# Patient Record
Sex: FEMALE
[De-identification: names, ages, dates, MRNs, and addresses within clinical notes are randomized per-mention and may not be internally consistent; named-entity substitution may affect disease eponyms.]

---

## 2017-03-12 ENCOUNTER — CHARTING TRANS (OUTPATIENT)
Dept: OTHER | Age: 47
End: 2017-03-12

## 2018-11-05 VITALS
RESPIRATION RATE: 18 BRPM | SYSTOLIC BLOOD PRESSURE: 114 MMHG | DIASTOLIC BLOOD PRESSURE: 80 MMHG | HEART RATE: 80 BPM | TEMPERATURE: 98.8 F

## 2019-07-29 ENCOUNTER — TELEPHONE (OUTPATIENT)
Dept: SCHEDULING | Age: 49
End: 2019-07-29

## 2019-07-29 ENCOUNTER — WALK IN (OUTPATIENT)
Dept: URGENT CARE | Age: 49
End: 2019-07-29

## 2019-07-29 VITALS
RESPIRATION RATE: 16 BRPM | TEMPERATURE: 98.4 F | BODY MASS INDEX: 28.15 KG/M2 | DIASTOLIC BLOOD PRESSURE: 80 MMHG | OXYGEN SATURATION: 98 % | HEIGHT: 70 IN | SYSTOLIC BLOOD PRESSURE: 132 MMHG | HEART RATE: 84 BPM | WEIGHT: 196.65 LBS

## 2019-07-29 DIAGNOSIS — H66.92 LEFT OTITIS MEDIA, UNSPECIFIED OTITIS MEDIA TYPE: Primary | ICD-10-CM

## 2019-07-29 PROCEDURE — 99214 OFFICE O/P EST MOD 30 MIN: CPT | Performed by: NURSE PRACTITIONER

## 2019-07-29 RX ORDER — AMOXICILLIN 875 MG/1
875 TABLET, COATED ORAL 2 TIMES DAILY
Qty: 20 TABLET | Refills: 0 | Status: SHIPPED | OUTPATIENT
Start: 2019-07-29 | End: 2019-08-08

## 2019-07-29 ASSESSMENT — ENCOUNTER SYMPTOMS
NEUROLOGICAL NEGATIVE: 1
ENDOCRINE NEGATIVE: 1
EYES NEGATIVE: 1
RESPIRATORY NEGATIVE: 1
FACIAL SWELLING: 0
SINUS PRESSURE: 0
GASTROINTESTINAL NEGATIVE: 1
CONSTITUTIONAL NEGATIVE: 1
RHINORRHEA: 0
SINUS PAIN: 0

## 2023-11-06 ENCOUNTER — APPOINTMENT (OUTPATIENT)
Dept: CT IMAGING | Facility: HOSPITAL | Age: 53
End: 2023-11-06
Attending: EMERGENCY MEDICINE
Payer: COMMERCIAL

## 2023-11-06 ENCOUNTER — APPOINTMENT (OUTPATIENT)
Dept: GENERAL RADIOLOGY | Facility: HOSPITAL | Age: 53
End: 2023-11-06
Attending: EMERGENCY MEDICINE
Payer: COMMERCIAL

## 2023-11-06 ENCOUNTER — APPOINTMENT (OUTPATIENT)
Dept: MRI IMAGING | Facility: HOSPITAL | Age: 53
End: 2023-11-06
Attending: EMERGENCY MEDICINE
Payer: COMMERCIAL

## 2023-11-06 ENCOUNTER — HOSPITAL ENCOUNTER (EMERGENCY)
Facility: HOSPITAL | Age: 53
Discharge: HOME | End: 2023-11-06
Attending: EMERGENCY MEDICINE
Payer: COMMERCIAL

## 2023-11-06 VITALS
OXYGEN SATURATION: 97 % | RESPIRATION RATE: 18 BRPM | TEMPERATURE: 98 F | DIASTOLIC BLOOD PRESSURE: 63 MMHG | WEIGHT: 183 LBS | HEIGHT: 70 IN | HEART RATE: 67 BPM | SYSTOLIC BLOOD PRESSURE: 103 MMHG | BODY MASS INDEX: 26.2 KG/M2

## 2023-11-06 DIAGNOSIS — E87.1 HYPONATREMIA: ICD-10-CM

## 2023-11-06 DIAGNOSIS — J01.20 ACUTE ETHMOIDAL SINUSITIS, RECURRENCE NOT SPECIFIED: Primary | ICD-10-CM

## 2023-11-06 DIAGNOSIS — R20.2 PARESTHESIAS: ICD-10-CM

## 2023-11-06 DIAGNOSIS — U07.1 COVID-19 VIRUS INFECTION: ICD-10-CM

## 2023-11-06 DIAGNOSIS — R91.1 LUNG NODULE SEEN ON IMAGING STUDY: ICD-10-CM

## 2023-11-06 LAB
ANION GAP SERPL CALC-SCNC: 9 MMOL/L (ref 0–18)
ATRIAL RATE: 68 BPM
BASOPHILS # BLD AUTO: 0.02 X10(3) UL (ref 0–0.2)
BASOPHILS NFR BLD AUTO: 0.4 %
BILIRUB UR QL: NEGATIVE
BUN BLD-MCNC: 10 MG/DL (ref 9–23)
BUN/CREAT SERPL: 12.2 (ref 10–20)
CALCIUM BLD-MCNC: 9.3 MG/DL (ref 8.7–10.4)
CHLORIDE SERPL-SCNC: 99 MMOL/L (ref 98–112)
CLARITY UR: CLEAR
CO2 SERPL-SCNC: 24 MMOL/L (ref 21–32)
COLOR UR: YELLOW
CREAT BLD-MCNC: 0.82 MG/DL
D DIMER PPP FEU-MCNC: 0.89 UG/ML FEU (ref ?–0.53)
DEPRECATED RDW RBC AUTO: 46.5 FL (ref 35.1–46.3)
EGFRCR SERPLBLD CKD-EPI 2021: 85 ML/MIN/1.73M2 (ref 60–?)
EOSINOPHIL # BLD AUTO: 0.06 X10(3) UL (ref 0–0.7)
EOSINOPHIL NFR BLD AUTO: 1.2 %
ERYTHROCYTE [DISTWIDTH] IN BLOOD BY AUTOMATED COUNT: 13 % (ref 11–15)
FLUAV + FLUBV RNA SPEC NAA+PROBE: NEGATIVE
FLUAV + FLUBV RNA SPEC NAA+PROBE: NEGATIVE
GLUCOSE BLD-MCNC: 82 MG/DL (ref 70–99)
GLUCOSE UR-MCNC: NORMAL MG/DL
HCT VFR BLD AUTO: 46.5 %
HGB BLD-MCNC: 16 G/DL
HGB UR QL STRIP.AUTO: NEGATIVE
IMM GRANULOCYTES # BLD AUTO: 0.01 X10(3) UL (ref 0–1)
IMM GRANULOCYTES NFR BLD: 0.2 %
KETONES UR-MCNC: 40 MG/DL
LEUKOCYTE ESTERASE UR QL STRIP.AUTO: 250
LYMPHOCYTES # BLD AUTO: 2.03 X10(3) UL (ref 1–4)
LYMPHOCYTES NFR BLD AUTO: 40.1 %
MCH RBC QN AUTO: 32.9 PG (ref 26–34)
MCHC RBC AUTO-ENTMCNC: 34.4 G/DL (ref 31–37)
MCV RBC AUTO: 95.7 FL
MONOCYTES # BLD AUTO: 0.39 X10(3) UL (ref 0.1–1)
MONOCYTES NFR BLD AUTO: 7.7 %
NEUTROPHILS # BLD AUTO: 2.55 X10 (3) UL (ref 1.5–7.7)
NEUTROPHILS # BLD AUTO: 2.55 X10(3) UL (ref 1.5–7.7)
NEUTROPHILS NFR BLD AUTO: 50.4 %
OSMOLALITY SERPL CALC.SUM OF ELEC: 272 MOSM/KG (ref 275–295)
P AXIS: 52 DEGREES
P-R INTERVAL: 144 MS
PH UR: 5.5 [PH] (ref 5–8)
PLATELET # BLD AUTO: 155 10(3)UL (ref 150–450)
POTASSIUM SERPL-SCNC: 3.6 MMOL/L (ref 3.5–5.1)
PROT UR-MCNC: NEGATIVE MG/DL
Q-T INTERVAL: 420 MS
QRS DURATION: 94 MS
QTC CALCULATION (BEZET): 446 MS
R AXIS: 67 DEGREES
RBC # BLD AUTO: 4.86 X10(6)UL
RSV RNA SPEC NAA+PROBE: NEGATIVE
SARS-COV-2 RNA RESP QL NAA+PROBE: DETECTED
SODIUM SERPL-SCNC: 132 MMOL/L (ref 136–145)
SP GR UR STRIP: 1.01 (ref 1–1.03)
T AXIS: 63 DEGREES
TROPONIN I SERPL HS-MCNC: 13 NG/L
UROBILINOGEN UR STRIP-ACNC: NORMAL
VENTRICULAR RATE: 68 BPM
WBC # BLD AUTO: 5.1 X10(3) UL (ref 4–11)

## 2023-11-06 PROCEDURE — 0241U SARS-COV-2/FLU A AND B/RSV BY PCR (GENEXPERT): CPT | Performed by: EMERGENCY MEDICINE

## 2023-11-06 PROCEDURE — 71260 CT THORAX DX C+: CPT | Performed by: EMERGENCY MEDICINE

## 2023-11-06 PROCEDURE — 96361 HYDRATE IV INFUSION ADD-ON: CPT

## 2023-11-06 PROCEDURE — 84484 ASSAY OF TROPONIN QUANT: CPT | Performed by: EMERGENCY MEDICINE

## 2023-11-06 PROCEDURE — 70551 MRI BRAIN STEM W/O DYE: CPT | Performed by: EMERGENCY MEDICINE

## 2023-11-06 PROCEDURE — 96360 HYDRATION IV INFUSION INIT: CPT

## 2023-11-06 PROCEDURE — 93010 ELECTROCARDIOGRAM REPORT: CPT

## 2023-11-06 PROCEDURE — 71045 X-RAY EXAM CHEST 1 VIEW: CPT | Performed by: EMERGENCY MEDICINE

## 2023-11-06 PROCEDURE — 81001 URINALYSIS AUTO W/SCOPE: CPT | Performed by: EMERGENCY MEDICINE

## 2023-11-06 PROCEDURE — 99285 EMERGENCY DEPT VISIT HI MDM: CPT

## 2023-11-06 PROCEDURE — 85025 COMPLETE CBC W/AUTO DIFF WBC: CPT | Performed by: EMERGENCY MEDICINE

## 2023-11-06 PROCEDURE — 80048 BASIC METABOLIC PNL TOTAL CA: CPT | Performed by: EMERGENCY MEDICINE

## 2023-11-06 PROCEDURE — 93005 ELECTROCARDIOGRAM TRACING: CPT

## 2023-11-06 PROCEDURE — 85379 FIBRIN DEGRADATION QUANT: CPT | Performed by: EMERGENCY MEDICINE

## 2023-11-06 RX ORDER — IOHEXOL 350 MG/ML
75 INJECTION, SOLUTION INTRAVENOUS
Status: COMPLETED | OUTPATIENT
Start: 2023-11-06 | End: 2023-11-06

## 2023-11-06 RX ORDER — MECLIZINE HYDROCHLORIDE 25 MG/1
25 TABLET ORAL 3 TIMES DAILY PRN
Qty: 21 TABLET | Refills: 0 | Status: SHIPPED | OUTPATIENT
Start: 2023-11-06

## 2023-11-06 RX ORDER — AMOXICILLIN AND CLAVULANATE POTASSIUM 875; 125 MG/1; MG/1
1 TABLET, FILM COATED ORAL 2 TIMES DAILY
Qty: 20 TABLET | Refills: 0 | Status: SHIPPED | OUTPATIENT
Start: 2023-11-06 | End: 2023-11-16

## 2023-11-06 NOTE — ED QUICK NOTES
Pt discharged to home. Instructed on the importance of follow-up with a pulmonologist, taking medication as prescribed and to return sooner with any worsening of symptoms. All questions answered prior to disposition.

## 2023-11-06 NOTE — ED INITIAL ASSESSMENT (HPI)
Patient ambulatory to ED with complaint of covid+. Endorses worsening symptoms. Symptoms began friday. Breathing unlabored. Speaking in full clear sentences. Patient is AXOX4.

## 2023-11-06 NOTE — DISCHARGE INSTRUCTIONS
You need to see the lung Dr. in the next week to further evaluate the lung nodule. Augmentin and Meclizine as prescribed. Return for worse condition.

## 2023-11-06 NOTE — ED QUICK NOTES
Pt presents stating that she tested positive for COVID this morning and she has been having numbness/tingling in bilateral arms and legs along with difficulty walking. She said it feels like she is drunk but she doesn't drink.

## 2023-11-08 ENCOUNTER — TELEPHONE (OUTPATIENT)
Dept: PULMONOLOGY | Facility: CLINIC | Age: 53
End: 2023-11-08

## 2023-11-08 NOTE — TELEPHONE ENCOUNTER
----- Message from Andrei Rogers MD sent at 11/7/2023  1:05 PM CST -----  Regarding: RE: Lung nodule  Please add this patient to my schedule in 1 to 2 weeks  ----- Message -----  From: Jillian Amado MD  Sent: 11/6/2023   5:25 PM CST  To: Andrei Rogers MD  Subject: Lung nodule                                      Roque Rosa: This is the patient we talked about with the lung nodule.   Thanks, Chuckie Gomes

## 2023-11-22 ENCOUNTER — OFFICE VISIT (OUTPATIENT)
Dept: PULMONOLOGY | Facility: CLINIC | Age: 53
End: 2023-11-22

## 2023-11-22 VITALS
SYSTOLIC BLOOD PRESSURE: 88 MMHG | OXYGEN SATURATION: 97 % | BODY MASS INDEX: 26.34 KG/M2 | HEART RATE: 80 BPM | HEIGHT: 70 IN | DIASTOLIC BLOOD PRESSURE: 53 MMHG | RESPIRATION RATE: 13 BRPM | WEIGHT: 184 LBS

## 2023-11-22 DIAGNOSIS — Z87.891 PERSONAL HISTORY OF TOBACCO USE, PRESENTING HAZARDS TO HEALTH: ICD-10-CM

## 2023-11-22 DIAGNOSIS — J44.9 CHRONIC OBSTRUCTIVE PULMONARY DISEASE, UNSPECIFIED COPD TYPE (HCC): ICD-10-CM

## 2023-11-22 DIAGNOSIS — R91.8 LUNG MASS: Primary | ICD-10-CM

## 2023-11-22 NOTE — TELEPHONE ENCOUNTER
Attempted to contact patient multiple times without success regarding appointment.      Dr. Tracy Ross: Tolu Viramontes

## 2023-11-22 NOTE — H&P
[facility]    Pulmonary consult     Maci Chu Patient Status:  Specimen    1970 MRN [id number]   Location [facility] Attending No att. providers found   [facility] Day # 0 PCP None Pcp     Date:  2023    History provided by:patient  HPI:     Chief Complaint   Patient presents with    Consult     Pt was in ER on 23 and pt had testing done and was told she had nodules. HPI    51-year-old female with history of hysterectomy  for cervical cancer, lifelong history of smoking since age 15years old  Presented to ER on 2023 after she was tested positive for COVID-19 on 2023  No significant pulmonary symptoms with different other symptoms including dizziness and weakness in her brain MRI was negative  D-dimer slightly elevated and chest CT showed mild COPD changes and right upper lobe mass 2.9 cm with 2 other small subcentimeter nodules. No significant symptoms for now  Denied any significant cough or sputum or hemoptysis  No significant dyspnea or dyspnea upon exertion or wheezes  No TB or exposure  No prior pneumonia or chest trauma or lung surgery  No skin rashes or joint pain  No lower extremity edema or pain or calf tenderness  No fever or chills        History     Past Medical History:   Diagnosis Date    H/O: hysterectomy      History reviewed. No pertinent surgical history. No family history on file. Social History:  Social History     Socioeconomic History    Marital status:    Tobacco Use    Smoking status: Every Day     Types: Cigarettes    Smokeless tobacco: Never     Allergies/Medications: Allergies: No Known Allergies  (Not in a hospital admission)      Review of Systems:     Constitutional: Negative. HENT: Negative. Eyes: Negative. Respiratory: Negative. Cardiovascular: Negative. Gastrointestinal: Negative. Genitourinary: Negative.     Musculoskeletal: Negative. Skin: Negative. Neurological: Negative. Hematological: Negative. Psychiatric/Behavioral: Negative. Physical Exam:   Vital Signs:  Blood pressure (!) 76/43, pulse 80, resp. rate 13, height 5' 10\" (1.778 m), weight 184 lb (83.5 kg), SpO2 97%. Physical Exam  Constitutional:       General: She is not in acute distress. Appearance: Normal appearance. She is not ill-appearing. HENT:      Head: Normocephalic and atraumatic. Nose: Nose normal.      Mouth/Throat:      Mouth: Mucous membranes are moist.   Eyes:      General: No scleral icterus. Pupils: Pupils are equal, round, and reactive to light. Cardiovascular:      Rate and Rhythm: Normal rate. Pulses: Normal pulses. Heart sounds: No murmur heard. No gallop. Pulmonary:      Effort: No respiratory distress. Breath sounds: No stridor. No wheezing, rhonchi or rales. Chest:      Chest wall: No tenderness. Abdominal:      General: Abdomen is flat. Bowel sounds are normal. There is no distension. Palpations: Abdomen is soft. There is no mass. Tenderness: There is no abdominal tenderness. There is no guarding. Hernia: No hernia is present. Musculoskeletal:      Cervical back: Normal range of motion. No rigidity. Right lower leg: No edema. Left lower leg: No edema. Skin:     General: Skin is dry. Neurological:      General: No focal deficit present. Mental Status: She is oriented to person, place, and time. Results:     Lab Results   Component Value Date    WBC 5.1 11/06/2023    HGB 16.0 11/06/2023    HCT 46.5 11/06/2023    .0 11/06/2023    CREATSERUM 0.82 11/06/2023    BUN 10 11/06/2023     (L) 11/06/2023    K 3.6 11/06/2023    CL 99 11/06/2023    CO2 24.0 11/06/2023    GLU 82 11/06/2023    CA 9.3 11/06/2023    DDIMER 0.89 (H) 11/06/2023    TROPHS 13 11/06/2023     Chest ct 11/7/2023 reviewed independently  Impression   CONCLUSION:  1.  Negative for pulmonary embolism. 2. No findings to indicate acute viral pneumonia due to COVID-19.  3. Moderate emphysema with a nodular opacity at the right lung apex measuring 2.9 x 1.8 x 1.6 cm. While the elongated configuration could relate to a focus of nodular scarring or bacterial pneumonia, the possibility of a malignancy such as a primary  bronchogenic carcinoma cannot be excluded. According to the 2017 guidelines from the [address] [place] [address] for the follow-up and management of incidentally detected indeterminate pulmonary nodules, please consider the following recommendations after  clinical assessment of risk factors: Consider CT at three months, PET-CT or tissue sampling. However, negative PET-CT does not exclude low-grade malignancy, FDG uptake may be underestimated in small nodules <1cm, or those close to the diaphragm. 4. Additional sub 6 mm noncalcified pulmonary nodules are seen in both upper lobes, which are nonspecific but are probably inflammatory in nature/due to small airways infection. These nodules can be reassessed on the follow-up study recommended above. 5. Mildly enlarged right hilar lymph node and additional small middle mediastinal/left hilar lymph nodes that are not pathologic by CT size criteria. These lymph nodes may all be benign/reactive in nature, although juan metastases cannot be excluded in   the setting of a primary bronchogenic carcinoma. Br  Impression   CONCLUSION:     Limited 3-sequence stroke protocol study was performed. Within these parameters:     1. No acute intracranial process. 2. Moderate left ethmoidomaxillary sinusitis.   Right maxillary sinus retention cyst.   ain MRI :    Assessment/Plan:     1- lung mass / RUL 2.9 x 1.8 cm   Small right hilar and subcarinal lymph node  2 tiny nodules in the apex about 6 mm    Lifelong history of smoking  Possible primary bronchogenic carcinoma    Plan :   PET scan and then likely to proceed with bronchoscopy and biopsy    2-lifelong history of smoking  Minimal symptoms for COPD /normal lung exam  Mild emphysema changes on chest CT    Plan :  Smoking cessation /counseling provided  PFTs     3- h/o covid 19 in 11/6/23   Recovered and asymptomatic now     D/w pt at length /all questions were answered. Orders for today :  PET scan  PFTs  Follow-up in 2 to 3 weeks            Katheryn Glover MD  11/22/2023

## 2023-12-08 ENCOUNTER — HOSPITAL ENCOUNTER (OUTPATIENT)
Dept: NUCLEAR MEDICINE | Facility: HOSPITAL | Age: 53
Discharge: HOME | End: 2023-12-08
Attending: INTERNAL MEDICINE
Payer: COMMERCIAL

## 2023-12-08 DIAGNOSIS — R91.8 LUNG MASS: ICD-10-CM

## 2023-12-08 LAB — GLUCOSE BLDC GLUCOMTR-MCNC: 116 MG/DL (ref 70–99)

## 2023-12-08 PROCEDURE — 82962 GLUCOSE BLOOD TEST: CPT

## 2023-12-08 PROCEDURE — 78815 PET IMAGE W/CT SKULL-THIGH: CPT | Performed by: INTERNAL MEDICINE

## 2023-12-14 ENCOUNTER — HOSPITAL ENCOUNTER (OUTPATIENT)
Dept: RESPIRATORY THERAPY | Facility: HOSPITAL | Age: 53
Discharge: HOME | End: 2023-12-14
Attending: INTERNAL MEDICINE
Payer: COMMERCIAL

## 2023-12-14 DIAGNOSIS — J44.9 CHRONIC OBSTRUCTIVE PULMONARY DISEASE, UNSPECIFIED COPD TYPE (HCC): ICD-10-CM

## 2023-12-14 DIAGNOSIS — Z87.891 PERSONAL HISTORY OF TOBACCO USE, PRESENTING HAZARDS TO HEALTH: ICD-10-CM

## 2023-12-14 PROCEDURE — 94060 EVALUATION OF WHEEZING: CPT | Performed by: INTERNAL MEDICINE

## 2023-12-14 PROCEDURE — 94726 PLETHYSMOGRAPHY LUNG VOLUMES: CPT | Performed by: INTERNAL MEDICINE

## 2023-12-14 PROCEDURE — 94729 DIFFUSING CAPACITY: CPT | Performed by: INTERNAL MEDICINE

## 2023-12-15 ENCOUNTER — TELEPHONE (OUTPATIENT)
Dept: PULMONOLOGY | Facility: CLINIC | Age: 53
End: 2023-12-15

## 2023-12-15 ENCOUNTER — OFFICE VISIT (OUTPATIENT)
Dept: PULMONOLOGY | Facility: CLINIC | Age: 53
End: 2023-12-15

## 2023-12-15 VITALS
HEART RATE: 75 BPM | DIASTOLIC BLOOD PRESSURE: 62 MMHG | HEIGHT: 70 IN | RESPIRATION RATE: 16 BRPM | WEIGHT: 184.19 LBS | OXYGEN SATURATION: 96 % | SYSTOLIC BLOOD PRESSURE: 101 MMHG | BODY MASS INDEX: 26.37 KG/M2

## 2023-12-15 DIAGNOSIS — J44.9 CHRONIC OBSTRUCTIVE PULMONARY DISEASE, UNSPECIFIED COPD TYPE (HCC): ICD-10-CM

## 2023-12-15 DIAGNOSIS — R91.8 LUNG MASS: Primary | ICD-10-CM

## 2023-12-15 DIAGNOSIS — D49.1 LUNG TUMOR: Primary | ICD-10-CM

## 2023-12-15 PROCEDURE — 3078F DIAST BP <80 MM HG: CPT | Performed by: INTERNAL MEDICINE

## 2023-12-15 PROCEDURE — 3008F BODY MASS INDEX DOCD: CPT | Performed by: INTERNAL MEDICINE

## 2023-12-15 PROCEDURE — 99214 OFFICE O/P EST MOD 30 MIN: CPT | Performed by: INTERNAL MEDICINE

## 2023-12-15 PROCEDURE — 3074F SYST BP LT 130 MM HG: CPT | Performed by: INTERNAL MEDICINE

## 2023-12-15 RX ORDER — ALBUTEROL SULFATE 90 UG/1
2 AEROSOL, METERED RESPIRATORY (INHALATION) EVERY 6 HOURS PRN
Qty: 1 EACH | Refills: 0 | Status: SHIPPED | OUTPATIENT
Start: 2023-12-15

## 2023-12-15 NOTE — TELEPHONE ENCOUNTER
Per Dr. Rekha Zuñiga Robotic EBUS scheduled on 12/27/23 at 10am,  CT ION protocol needs to be scheduled at 8am.    Dr. Meri Bo - Please review/sign pended orders.

## 2023-12-15 NOTE — PROCEDURES
[facility]     Pulmonary Function Test     Massimo Zuniga Patient Status:  Outpatient    1970 MRN [id number]   Date of Exam 23 PCP None Pcp           Spirometry   FEV1: 3.03 93%  FVC: 4.50 109%  FEV1/FVC: 0.67    Lung Volume   T.14 120%  RV : 2.64 123%    Diffusion Capacity   DLCO: 14.7 59%    Flow Volume Loop       Impression   Mild obstructive defect seen without significant postbronchodilator response observed. Normal lung volumes with some air trapping seen.   Mild to moderate reduction in diffusion capacity    Marry Valerio, [address]

## 2023-12-15 NOTE — PROGRESS NOTES
Subjective:   Patient ID: Renato Murillo is a 48year old female. HPI    History/Other:   Review of Systems   Constitutional: Negative. HENT: Negative. Eyes: Negative. Respiratory: Negative. Cardiovascular: Negative. Gastrointestinal: Negative. Genitourinary: Negative. Musculoskeletal: Negative. Skin: Negative. Neurological: Negative. Hematological: Negative. Psychiatric/Behavioral: Negative. Current Outpatient Medications   Medication Sig Dispense Refill    meclizine 25 MG Oral Tab Take 1 tablet (25 mg total) by mouth 3 (three) times daily as needed. (Patient not taking: Reported on 11/22/2023) 21 tablet 0     Allergies:No Known Allergies    Objective:   Physical Exam  Constitutional:       General: She is not in acute distress. Appearance: Normal appearance. She is not ill-appearing. HENT:      Head: Normocephalic and atraumatic. Nose: Nose normal.      Mouth/Throat:      Mouth: Mucous membranes are moist.   Eyes:      General: No scleral icterus. Pupils: Pupils are equal, round, and reactive to light. Cardiovascular:      Rate and Rhythm: Normal rate. Pulses: Normal pulses. Heart sounds: No murmur heard. No gallop. Pulmonary:      Effort: Pulmonary effort is normal. No respiratory distress. Breath sounds: No stridor. No wheezing, rhonchi or rales. Abdominal:      General: Abdomen is flat. Bowel sounds are normal. There is no distension. Palpations: There is no mass. Tenderness: There is no abdominal tenderness. Musculoskeletal:      Cervical back: Normal range of motion. Right lower leg: No edema. Left lower leg: No edema. Skin:     General: Skin is dry. Neurological:      General: No focal deficit present. Mental Status: She is oriented to person, place, and time.            PET scan 12/8/2023 reviewed personally   FINDINGS:  Mediastinal blood pool is 1 point max SUV, and hepatic blood pool is 2.6 max SUV.     HEAD/NECK: No pathologic FDG activity. Bilateral maxillary sinus retention cysts. LUNGS: Approximately 2 cm irregular right apical pulmonary nodule with SUV max of 4.6 . A 6 mm peripheral posterior right upper lobe pulmonary nodule is not FDG-avid. Emphysema. MEDIASTINUM/AARON: 11 x 15 mm lower right paratracheal lymph node with SUV max of 4.4. Small hiatal hernia. CHEST WALL/AXILLA: No pathologic FDG activity. ABDOMEN/PELVIS: No pathologic FDG activity. Atherosclerotic vascular calcification. Small fat containing umbilical hernia. Atherosclerotic vascular calcification. Post hysterectomy. MUSCULOSKELETAL: No pathologic FDG activity. No suspicious lesions on CT imaging. Impression   CONCLUSION:  1. 2 cm irregular hypermetabolic right apical pulmonary nodule compatible with bronchogenic carcinoma. Hypermetabolic lower right paratracheal lymph node suspicious for metastatic disease. 2. Nonspecific 6 mm right upper lobe pulmonary nodule is not FDG avid, but this could be related to nodule size. 3. Fax       PFTs   Spirometry   FEV1: 3.03 93%  FVC: 4.50 109%  FEV1/FVC: 0.67     Lung Volume   T.14 120%  RV : 2.64 123%     Diffusion Capacity   DLCO: 14.7 59%     Flow Volume Loop         Impression   Mild obstructive defect seen without significant postbronchodilator response observed. Normal lung volumes with some air trapping seen. Mild to moderate reduction in diffusion capacity     Assessment & Plan:   1. Lung tumor    2. Chronic obstructive pulmonary disease, unspecified COPD type ([address]      1- lung mass / RUL 2 cm   Positive  on PET scan with positive right 4 R / 1.2 cm paratracheal lymph node .     Most consistent with primary bronchogenic carcinoma especially with extensive history of smoking    Plan :   Robotic bronchoscopy with EBUS by Dr. Bryson Kyle  I already discussed the case with him and with the patient and she is agreeable       Lifelong history of smoking  Possible primary bronchogenic carcinoma       2- Mild copd / FEV1 3.03 L 93 %   lifelong history of smoking  Minimal symptoms for COPD /normal lung exam  Mild emphysema changes on chest CT     Plan :  Smoking cessation /counseling provided  Albuterol prn       F/u in 2-3 weeks                  Meds This Visit:  Requested Prescriptions      No prescriptions requested or ordered in this encounter       Imaging & Referrals:  None

## 2023-12-18 NOTE — TELEPHONE ENCOUNTER
Spoke with Dr. Venita Thao, procedure will now be at 9:30am on 12/27/23, confirmed time with [facility] in Endo scheduling.

## 2023-12-19 NOTE — TELEPHONE ENCOUNTER
Signed off on orders. Actually I am still in the process of trying to coordinate if this will be at 930 or 10:00 still give me a day or so before I sort this out.

## 2023-12-21 NOTE — TELEPHONE ENCOUNTER
Spoke with Dr. Hortencia Piedra, prefers 10am procedure time. Spoke with Meg in Endoscopy Scheduling, Robotic EBUS scheduled for 12/27/23 at [facility] with Emir Muller in [facility], CT ION protocol scheduled on 12/27/23 at [address] [facility] with patient, informed her of CT time, procedure time, location and not to eat after midnight, patient verbalized understanding.

## 2023-12-27 ENCOUNTER — ANESTHESIA (OUTPATIENT)
Dept: ENDOSCOPY | Facility: HOSPITAL | Age: 53
End: 2023-12-27
Payer: COMMERCIAL

## 2023-12-27 ENCOUNTER — APPOINTMENT (OUTPATIENT)
Dept: GENERAL RADIOLOGY | Facility: HOSPITAL | Age: 53
End: 2023-12-27
Attending: INTERNAL MEDICINE
Payer: COMMERCIAL

## 2023-12-27 ENCOUNTER — ANESTHESIA EVENT (OUTPATIENT)
Dept: ENDOSCOPY | Facility: HOSPITAL | Age: 53
End: 2023-12-27
Payer: COMMERCIAL

## 2023-12-27 ENCOUNTER — HOSPITAL ENCOUNTER (OUTPATIENT)
Facility: HOSPITAL | Age: 53
Setting detail: HOSPITAL OUTPATIENT SURGERY
Discharge: HOME | End: 2023-12-27
Attending: INTERNAL MEDICINE | Admitting: INTERNAL MEDICINE
Payer: COMMERCIAL

## 2023-12-27 ENCOUNTER — HOSPITAL ENCOUNTER (OUTPATIENT)
Dept: CT IMAGING | Facility: HOSPITAL | Age: 53
Discharge: HOME | End: 2023-12-27
Attending: INTERNAL MEDICINE
Payer: COMMERCIAL

## 2023-12-27 DIAGNOSIS — R91.1 NODULE OF RIGHT LUNG: ICD-10-CM

## 2023-12-27 DIAGNOSIS — R91.8 LUNG MASS: ICD-10-CM

## 2023-12-27 DIAGNOSIS — R59.0 MEDIASTINAL LYMPHADENOPATHY: ICD-10-CM

## 2023-12-27 LAB
BASOPHILS NFR BRONCH: 0 %
EOSINOPHIL NFR BRONCH: 0 %
LYMPHOCYTES NFR BRONCH: 24 %
MONOS+MACROS NFR BRONCH: 40 %
NEUTROPHILS NFR BRONCH: 36 %
RBC # FLD: ABNORMAL /CUMM (ref ?–1)
TOTAL CELLS COUNTED BRONCH: 37 /CUMM (ref ?–1)
TOTAL CELLS COUNTED FLD: 100
WBC CALC (IRIS) BRW: 37 /CUMM

## 2023-12-27 PROCEDURE — 76000 FLUOROSCOPY <1 HR PHYS/QHP: CPT | Performed by: INTERNAL MEDICINE

## 2023-12-27 PROCEDURE — 31654 BRONCH EBUS IVNTJ PERPH LES: CPT | Performed by: INTERNAL MEDICINE

## 2023-12-27 PROCEDURE — 0BBC8ZX EXCISION OF RIGHT UPPER LUNG LOBE, VIA NATURAL OR ARTIFICIAL OPENING ENDOSCOPIC, DIAGNOSTIC: ICD-10-PCS | Performed by: INTERNAL MEDICINE

## 2023-12-27 PROCEDURE — 31628 BRONCHOSCOPY/LUNG BX EACH: CPT | Performed by: INTERNAL MEDICINE

## 2023-12-27 PROCEDURE — 07D78ZX EXTRACTION OF THORAX LYMPHATIC, VIA NATURAL OR ARTIFICIAL OPENING ENDOSCOPIC, DIAGNOSTIC: ICD-10-PCS | Performed by: INTERNAL MEDICINE

## 2023-12-27 PROCEDURE — 8E0WXCZ ROBOTIC ASSISTED PROCEDURE OF TRUNK REGION: ICD-10-PCS | Performed by: INTERNAL MEDICINE

## 2023-12-27 PROCEDURE — 31627 NAVIGATIONAL BRONCHOSCOPY: CPT | Performed by: INTERNAL MEDICINE

## 2023-12-27 PROCEDURE — 31652 BRONCH EBUS SAMPLNG 1/2 NODE: CPT | Performed by: INTERNAL MEDICINE

## 2023-12-27 PROCEDURE — 76497 UNLISTED CT PROCEDURE: CPT | Performed by: INTERNAL MEDICINE

## 2023-12-27 PROCEDURE — 71045 X-RAY EXAM CHEST 1 VIEW: CPT | Performed by: INTERNAL MEDICINE

## 2023-12-27 PROCEDURE — 31629 BRONCHOSCOPY/NEEDLE BX EACH: CPT | Performed by: INTERNAL MEDICINE

## 2023-12-27 PROCEDURE — 31624 DX BRONCHOSCOPE/LAVAGE: CPT | Performed by: INTERNAL MEDICINE

## 2023-12-27 RX ORDER — MIDAZOLAM HYDROCHLORIDE 1 MG/ML
INJECTION INTRAMUSCULAR; INTRAVENOUS AS NEEDED
Status: DISCONTINUED | OUTPATIENT
Start: 2023-12-27 | End: 2023-12-27 | Stop reason: SURG

## 2023-12-27 RX ORDER — IPRATROPIUM BROMIDE AND ALBUTEROL SULFATE 2.5; .5 MG/3ML; MG/3ML
3 SOLUTION RESPIRATORY (INHALATION) ONCE
Status: COMPLETED | OUTPATIENT
Start: 2023-12-27 | End: 2023-12-27

## 2023-12-27 RX ORDER — DEXAMETHASONE SODIUM PHOSPHATE 4 MG/ML
VIAL (ML) INJECTION AS NEEDED
Status: DISCONTINUED | OUTPATIENT
Start: 2023-12-27 | End: 2023-12-27 | Stop reason: SURG

## 2023-12-27 RX ORDER — SODIUM CHLORIDE, SODIUM LACTATE, POTASSIUM CHLORIDE, CALCIUM CHLORIDE 600; 310; 30; 20 MG/100ML; MG/100ML; MG/100ML; MG/100ML
INJECTION, SOLUTION INTRAVENOUS CONTINUOUS
Status: DISCONTINUED | OUTPATIENT
Start: 2023-12-27 | End: 2023-12-27

## 2023-12-27 RX ORDER — NALOXONE HYDROCHLORIDE 0.4 MG/ML
0.08 INJECTION, SOLUTION INTRAMUSCULAR; INTRAVENOUS; SUBCUTANEOUS ONCE AS NEEDED
Status: DISCONTINUED | OUTPATIENT
Start: 2023-12-27 | End: 2023-12-27 | Stop reason: HOSPADM

## 2023-12-27 RX ORDER — EPHEDRINE SULFATE 50 MG/ML
INJECTION, SOLUTION INTRAVENOUS AS NEEDED
Status: DISCONTINUED | OUTPATIENT
Start: 2023-12-27 | End: 2023-12-27 | Stop reason: SURG

## 2023-12-27 RX ORDER — ONDANSETRON 2 MG/ML
INJECTION INTRAMUSCULAR; INTRAVENOUS AS NEEDED
Status: DISCONTINUED | OUTPATIENT
Start: 2023-12-27 | End: 2023-12-27 | Stop reason: SURG

## 2023-12-27 RX ORDER — LIDOCAINE HYDROCHLORIDE 10 MG/ML
INJECTION, SOLUTION EPIDURAL; INFILTRATION; INTRACAUDAL; PERINEURAL AS NEEDED
Status: DISCONTINUED | OUTPATIENT
Start: 2023-12-27 | End: 2023-12-27 | Stop reason: SURG

## 2023-12-27 RX ORDER — ROCURONIUM BROMIDE 10 MG/ML
INJECTION, SOLUTION INTRAVENOUS AS NEEDED
Status: DISCONTINUED | OUTPATIENT
Start: 2023-12-27 | End: 2023-12-27 | Stop reason: SURG

## 2023-12-27 RX ADMIN — EPHEDRINE SULFATE 10 MG: 50 INJECTION, SOLUTION INTRAVENOUS at 10:32:00

## 2023-12-27 RX ADMIN — MIDAZOLAM HYDROCHLORIDE 2 MG: 1 INJECTION INTRAMUSCULAR; INTRAVENOUS at 10:08:00

## 2023-12-27 RX ADMIN — SODIUM CHLORIDE, SODIUM LACTATE, POTASSIUM CHLORIDE, CALCIUM CHLORIDE: 600; 310; 30; 20 INJECTION, SOLUTION INTRAVENOUS at 10:09:00

## 2023-12-27 RX ADMIN — ROCURONIUM BROMIDE 20 MG: 10 INJECTION, SOLUTION INTRAVENOUS at 10:48:00

## 2023-12-27 RX ADMIN — ROCURONIUM BROMIDE 50 MG: 10 INJECTION, SOLUTION INTRAVENOUS at 10:09:00

## 2023-12-27 RX ADMIN — SODIUM CHLORIDE, SODIUM LACTATE, POTASSIUM CHLORIDE, CALCIUM CHLORIDE: 600; 310; 30; 20 INJECTION, SOLUTION INTRAVENOUS at 10:24:00

## 2023-12-27 RX ADMIN — DEXAMETHASONE SODIUM PHOSPHATE 4 MG: 4 MG/ML VIAL (ML) INJECTION at 10:27:00

## 2023-12-27 RX ADMIN — LIDOCAINE HYDROCHLORIDE 50 MG: 10 INJECTION, SOLUTION EPIDURAL; INFILTRATION; INTRACAUDAL; PERINEURAL at 10:09:00

## 2023-12-27 RX ADMIN — ONDANSETRON 4 MG: 2 INJECTION INTRAMUSCULAR; INTRAVENOUS at 10:27:00

## 2023-12-27 NOTE — PROCEDURES
Robotic navigational bronchoscopy with transbronchial needle aspiration, transbronchial biopsy and bronchoalveolar lavage of right upper lobe nodule with radial probe endobronchial ultrasound and fluoroscopic guidance and linear endobronchial ultrasound with transbronchial needle aspiration of lymph node station 4R    Preoperative diagnosis: Right upper lobe lung nodule, mediastinal lymphadenopathy    Postoperative diagnosis: Right upper lobe lung nodule, mediastinal lymphadenopathy    Anesthesia: General    Consent: Risks and benefits were reviewed with the patient in detail regarding the procedure as well as anesthesia prior to the procedure. All questions were answered. Procedure:  Prior to procedure Ion protocol CT chest was loaded on [employer]. Target was marked and measured. Visual pathways were created to the lesion. The information was stored to her USB drive and loaded on the Ion controller software. After patient intubated video bronchoscope advanced through endotracheal tube and lower trachea visualized with appeared grossly normal in appearance. Main kam was sharp and mobile. The bronchoscope was advanced into the right mainstem bronchus and the right upper, middle and lower lobe segmental and subsegmental anatomy was visualized with appeared normal in appearance without evidence of any lesions, inflammatory changes or significant secretions. The bronchoscope was next advanced into the left mainstem bronchus and the left upper, lingular and lower lobe segmental and subsegmental anatomy was visualized without any gross abnormalities seen without evidence of any lesions, inflammatory changes or significant secretions. The robotic IM catheter was introduced via the swivel adapter into the endotracheal tube. Registration was performed and confirmed with acceptable divergence. Using shaped sensing robotic catheter guidance, the right upper lobe nodule was located. Subsequently, radial probe ultrasound was introduced through the robotic catheter confirming appropriate positioning with concentric view. Using fluoroscopic guidance, transbronchial needle aspiration, transbronchial biopsies and BAL were performed. Passes were given to the cytopathologist for screening. No evidence of significant bleeding appreciated. Robotic catheter was withdrawn. The robotic bronchoscope was removed and EBUS bronchoscope advanced through ET tube. Under direct ultrasound guidance, lymph node station 4R identified and EBUS TBNA obtained from lymph node station. Mediastinal and hilar lymph nodes were otherwise inspected with no other significant lymphadenopathy noted. All the airways were inspected once before with no evidence of bleeding and bronchoscope was removed and procedure completed. All samples sent for analysis. Saturations remained stable throughout the procedure.     Immediate blood loss: <5 ml      Rodolfo Diamond DO  Pulmonary Critical Care Medicine

## 2023-12-27 NOTE — ANESTHESIA PROCEDURE NOTES
Airway  Date/Time: 12/27/2023 10:15 AM  Airway not difficult    General Information and Staff    Patient location during procedure: endo  Resident/CRNA: Valentín Noel CRNA  Performed: CRNA   Performed by: Valentín Noel CRNA  Authorized by: Valentín Noel CRNA      Indications and Patient Condition  Indications for airway management: airway protection and anesthesia  Spontaneous ventilation: present  Sedation level: deep  Preoxygenated: yes  Patient position: sniffing  MILS maintained throughout  Mask difficulty assessment: 1 - vent by mask  No planned trial extubation    Final Airway Details  Final airway type: endotracheal airway      Successful airway: ETT  Cuffed: yes   Successful intubation technique: Video laryngoscopy  Endotracheal tube insertion site: oral  Blade: Paulina  Blade size: #3  ETT size (mm): 8.0    Cormack-Lehane Classification: grade I - full view of glottis  Cuff volume (mL): 10  Measured from: lips  ETT to lips (cm): 21  Number of attempts at approach: 1  Number of other approaches attempted: 0

## 2023-12-27 NOTE — ANESTHESIA PROCEDURE NOTES
Airway  Date/Time: 12/27/2023 10:15 AM    General Information and Staff    Resident/CRNA: Wandy Montenegro CRNA  Performed: CRNA   Performed by: Wandy Montenegro CRNA  Authorized by: Wandy Montenegro CRNA

## 2023-12-28 ENCOUNTER — TELEPHONE (OUTPATIENT)
Dept: HEMATOLOGY/ONCOLOGY | Facility: HOSPITAL | Age: 53
End: 2023-12-28

## 2023-12-28 VITALS
HEART RATE: 71 BPM | WEIGHT: 184 LBS | OXYGEN SATURATION: 98 % | RESPIRATION RATE: 11 BRPM | DIASTOLIC BLOOD PRESSURE: 51 MMHG | HEIGHT: 70 IN | SYSTOLIC BLOOD PRESSURE: 94 MMHG | BODY MASS INDEX: 26.34 KG/M2 | TEMPERATURE: 98 F

## 2023-12-28 LAB — M TB CMPLX RRNA SPEC QL PROBE: NOT DETECTED

## 2023-12-28 NOTE — TELEPHONE ENCOUNTER
Called patient latricia sands for Dr Elana Wright on 1/4/24 at 415 pm.  She verbalizes understanding.

## 2024-01-04 ENCOUNTER — OFFICE VISIT (OUTPATIENT)
Dept: HEMATOLOGY/ONCOLOGY | Facility: HOSPITAL | Age: 54
End: 2024-01-04
Attending: INTERNAL MEDICINE
Payer: COMMERCIAL

## 2024-01-04 VITALS
HEART RATE: 74 BPM | TEMPERATURE: 98 F | WEIGHT: 186 LBS | DIASTOLIC BLOOD PRESSURE: 61 MMHG | OXYGEN SATURATION: 97 % | RESPIRATION RATE: 16 BRPM | BODY MASS INDEX: 26.63 KG/M2 | HEIGHT: 70 IN | SYSTOLIC BLOOD PRESSURE: 118 MMHG

## 2024-01-04 DIAGNOSIS — C34.91 MALIGNANT NEOPLASM OF RIGHT LUNG, UNSPECIFIED PART OF LUNG (HCC): Primary | ICD-10-CM

## 2024-01-04 PROCEDURE — 99245 OFF/OP CONSLTJ NEW/EST HI 55: CPT | Performed by: INTERNAL MEDICINE

## 2024-01-05 NOTE — PROGRESS NOTES
Oncology Consult    Lung Cancer     1/4/23    Requesting: Dr. 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    The primary lesion is just under 3 cm biopsy-proven there is a hypermetabolic paratracheal lymph node biopsy negative for malignancy          Past Medical History:   Diagnosis Date    H/O: hysterectomy     Shortness of breath      Social History     Socioeconomic History    Marital status:    Tobacco Use    Smoking status: Every Day     Packs/day: .5     Types: Cigarettes    Smokeless tobacco: Never   Vaping Use    Vaping Use: Never used   Substance and Sexual Activity    Alcohol use: Not Currently    Drug use: Not Currently     Lung cancer in father    Current Outpatient Medications on File Prior to Visit   Medication Sig Dispense Refill    albuterol 108 (90 Base) MCG/ACT Inhalation Aero Soln Inhale 2 puffs into the lungs every 6 (six) hours as needed for Wheezing. inhale 2 puff by inhalation route  every 4 - 6 hours as needed (Patient not taking: Reported on 1/4/2024) 1 each 0    meclizine 25 MG Oral Tab Take 1 tablet (25 mg total) by mouth 3 (three) times daily as needed. (Patient not taking: Reported on 11/22/2023) 21 tablet 0     Current Facility-Administered Medications on File Prior to Visit   Medication Dose Route Frequency Provider Last Rate Last Admin    [COMPLETED] ipratropium-albuterol (Duoneb) 0.5-2.5 (3) MG/3ML inhalation solution 3 mL  3 mL Nebulization Once Rojelio Rivera DO   3 mL at 12/27/23 0943     Allergies   Allergen Reactions    Singulair [Montelukast] SWELLING    Pollen UNKNOWN     Vitals:    01/04/24 1635   BP: 118/61   Pulse: 74   Resp: 16   Temp: 97.7 °F (36.5 °C)     Nad, rr, nd, no edema, lozoya, no deformity, nl mood, nl skin    Review: Extensive review of thoracic tumor conference today discussion with thoracic surgery pulmonology radiology radiation oncology      A/P:  53 year old  With clinical  stage I adenocarcinoma of the right upper lobe 12/27/23.  She has an adenocarcinoma with focal squamous differentiation.  Paratracheal lymph node negative on EBUS  - Discussed at thoracic tumor conference today plan to proceed with surgery she has an appointment with thoracic surgery next week

## 2024-01-10 NOTE — H&P (VIEW-ONLY)
Thoracic Surgery Consult Note     Name: Emy Gibson   Age: 53 year old   Sex: female.   MRN: [id number]    Reason for Consultation: right upper lobe adenocarcinoma     Consulting Physician: Dr. Mccurdy    Subjective:     Chief Complaint: \"I have lung cancer\"     History of Present Illness:   Ms. Gibson is a 53 year old female current smoker presenting with right upper lobe adenocarcinoma.     This was found on imaging for COVID on 11/6/23 showing a right upper lobe nodule measuring 2.9x1.8x1.6cm with enlarged right hilar lymph node. Subsequent PET showed the right upper lobe nodule had a max SUV of 4.6 and a right paratracheal lymph node had a max SUV of 4.4. EBUS by Dr. Rivera showed the right upper lobe nodule was positive for adenocarcinoma and station 4R lymph node was negative for malignancy. She was referred by Dr. Mccurdy to discuss surgery.     Patient feels well. 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 She can go up at least two flights of stairs without dyspnea. She has a chronic cough from post nasal drip. Patient denies any chest pain, fevers, chills, weight loss, changes in vision, headache, or new bone pain.     PMH includes COVID 11/2023. No heart problems. No blood thinners. No prior thoracic surgeries. She is trying to quit smoking. She is down to a half a pack per day. Was smoking 2 ppd with 70+pyh. Patient has a family history of lung cancer with her mother.     Review Of Systems:   10 point review of systems was conducted and was negative except for the pertinent positives listed in the above HPI.    Past Medical History:   Past Medical History:   Diagnosis Date    H/O: hysterectomy     Shortness of breath        Past Surgical History:   Past Surgical History:   Procedure Laterality Date    HYSTEROSCOPY      TONSILLECTOMY         Social History:   Social History     Socioeconomic History    Marital status:      Spouse name: Not on file    Number of children: Not on file    Years of  education: Not on file    Highest education level: Not on file   Occupational History    Not on file   Tobacco Use    Smoking status: Every Day     Packs/day: .5     Types: Cigarettes    Smokeless tobacco: Never   Vaping Use    Vaping Use: Never used   Substance and Sexual Activity    Alcohol use: Not Currently    Drug use: Not Currently    Sexual activity: Not on file   Other Topics Concern    Not on file   Social History Narrative    Not on file     Social Determinants of Health     Financial Resource Strain: Not on file   Food Insecurity: Not on file   Transportation Needs: Not on file   Physical Activity: Not on file   Stress: Not on file   Social Connections: Not on file   Housing Stability: Not on file       Family History: No family history on file.    Allergies:  Allergies   Allergen Reactions    Singulair [Montelukast] SWELLING    Pollen UNKNOWN       Medications:   Current Outpatient Medications on File Prior to Visit   Medication Sig Dispense Refill    albuterol 108 (90 Base) MCG/ACT Inhalation Aero Soln Inhale 2 puffs into the lungs every 6 (six) hours as needed for Wheezing. inhale 2 puff by inhalation route  every 4 - 6 hours as needed (Patient not taking: Reported on 1/4/2024) 1 each 0    meclizine 25 MG Oral Tab Take 1 tablet (25 mg total) by mouth 3 (three) times daily as needed. (Patient not taking: Reported on 11/22/2023) 21 tablet 0     Current Facility-Administered Medications on File Prior to Visit   Medication Dose Route Frequency Provider Last Rate Last Admin    [COMPLETED] ipratropium-albuterol (Duoneb) 0.5-2.5 (3) MG/3ML inhalation solution 3 mL  3 mL Nebulization Once Rojelio Rivera, DO   3 mL at 12/27/23 0943     No current facility-administered medications on file as of 1/11/2024.         Objective:      Vital Signs:  /42 (BP Location: Left arm, Patient Position: Sitting, Cuff Size: adult)   Pulse 76   Temp 97.7 °F (36.5 °C) (Oral)   Resp 16   Ht 1.778 m (5' 10\")   Wt 86.2  kg (190 lb)   SpO2 98%   BMI 27.26 kg/m²     Physical Exam:  General: well appearing female in no acute distress  HEENT: Normocephalic, PERRL, EOMI, no scleral icterus  Neck: Supple, trachea midline, no JVD, no masses. Thyroid not grossly enlarged  Nodes: no cervical or supraclavicular lymphadenopathy appreciated  Heart: regular rate and rhythm. No murmurs, rubs or gallops. No lower extremity edema.  Lungs: Normal respiratory effort. Clear to ascultation bilaterally.   Abdomen: Soft, Non-tender, non-distended. No hepatosplenomegaly noted.  Extremities: No clubbing or cyanosis. No lateralizing weakness  Neuro: No gross cranial nerve defects, no loss of sensation  Psych:  oriented to person place and time, normal mood and affect      Labs:   Lab Results   Component Value Date/Time    WBC 5.1 11/06/2023 03:37 PM    HGB 16.0 11/06/2023 03:37 PM    HCT 46.5 11/06/2023 03:37 PM    .0 11/06/2023 03:37 PM    MCV 95.7 11/06/2023 03:37 PM     Lab Results   Component Value Date/Time     (L) 11/06/2023 03:37 PM    K 3.6 11/06/2023 03:37 PM    CL 99 11/06/2023 03:37 PM    CO2 24.0 11/06/2023 03:37 PM    BUN 10 11/06/2023 03:37 PM    GLU 82 11/06/2023 03:37 PM    CA 9.3 11/06/2023 03:37 PM     No results found for: \"INR\", \"PT\", \"PTT\"  No components found for: \"TROPI\"  No results found for: \"ALB\", \"TP\", \"ALT\", \"AST\", \"RODNEY\", \"LIPASE\"      Review of Data:   CT chest 11/6/23  FINDINGS:  VASCULATURE: There is adequate opacification of the pulmonary arterial tree. No suspicious filling defects are identified in the main, lobar, segmental, or proximal subsegmental pulmonary artery branches to suggest acute pulmonary embolism. The distal  subsegmental branches are less well assessed. The main pulmonary artery trunk is normal in caliber, measuring 1.7 cm.  CARDIAC: The heart is not enlarged. There is no bowing of the interventricular septum to suggest right ventricular strain.  There is no pericardial effusion.  THORACIC  AORTA: Unremarkable configuration without aneurysm or dissection.  There is mild calcific atherosclerosis in the aortic arch.  LUNGS/PLEURA: There is moderate upper lobe predominant centrilobular emphysema with mild biapical subpleural bleb formation.  There is redemonstration of a nodular opacity at the right lung apex measuring 2.9 x 1.8 x 1.6 cm (series 4, image 18 and series   6, image 65).  This nodule demonstrates an elongated configuration in the craniocaudad direction.  Associated reticulation extends to the adjacent pleural surface.  This finding is superimposed on mild biapical pleural/parenchymal scarring.  Additional noncalcified pulmonary nodules are seen.  Specifically, there is a 7 mm subpleural nodule in the lateral aspect of the right upper lobe adjacent to the major fissure (series 4, image 70).  Smaller subpleural nodules are seen in the lateral  aspect of the right upper lobe more superiorly measuring up to 4 mm (series 4, image 54).  A few smaller subpleural nodules are also seen in the left upper lobe.  There is no pleural effusion or pneumothorax.  AIRWAYS: The tracheobronchial tree is without central mass or obstructing lesion.  MEDIASTINUM/AARON: There is a mildly enlarged right hilar lymph node measuring 1.2 cm short axis.  There are small subcarinal and left hilar node seen that are not pathologic by CT size criteria.  CHEST WALL: No axillary mass or lymphadenopathy.    LIMITED ABDOMEN: Within the parameters of the arterial phase of contrast-enhancement, the included upper abdomen is unremarkable.    BONES: No bony lesion or fracture is seen.    OTHER: Negative.               Impression   CONCLUSION:  1. Negative for pulmonary embolism.  2. No findings to indicate acute viral pneumonia due to COVID-19.  3. Moderate emphysema with a nodular opacity at the right lung apex measuring 2.9 x 1.8 x 1.6 cm.  While the elongated configuration could relate to a focus of nodular scarring or bacterial  pneumonia, the possibility of a malignancy such as a primary  bronchogenic carcinoma cannot be excluded.  According to the 2017 guidelines from the Fleischner Society for the follow-up and management of incidentally detected indeterminate pulmonary nodules, please consider the following recommendations after  clinical assessment of risk factors: Consider CT at three months, PET-CT or tissue sampling. However, negative PET-CT does not exclude low-grade malignancy, FDG uptake may be underestimated in small nodules <1cm, or those close to the diaphragm.    4. Additional sub 6 mm noncalcified pulmonary nodules are seen in both upper lobes, which are nonspecific but are probably inflammatory in nature/due to small airways infection.  These nodules can be reassessed on the follow-up study recommended above.  5. Mildly enlarged right hilar lymph node and additional small middle mediastinal/left hilar lymph nodes that are not pathologic by CT size criteria.  These lymph nodes may all be benign/reactive in nature, although juan metastases cannot be excluded in   the setting of a primary bronchogenic carcinoma.     PET 12/8/23:   FINDINGS:  Mediastinal blood pool is 1 point max SUV, and hepatic blood pool is 2.6 max SUV.     HEAD/NECK: No pathologic FDG activity.  Bilateral maxillary sinus retention cysts.    LUNGS: Approximately 2 cm irregular right apical pulmonary nodule with SUV max of 4.6 .  A 6 mm peripheral posterior right upper lobe pulmonary nodule is not FDG-avid.  Emphysema.  MEDIASTINUM/AARON: 11 x 15 mm lower right paratracheal lymph node with SUV max of 4.4.  Small hiatal hernia.    CHEST WALL/AXILLA: No pathologic FDG activity.    ABDOMEN/PELVIS: No pathologic FDG activity.  Atherosclerotic vascular calcification.  Small fat containing umbilical hernia.  Atherosclerotic vascular calcification.  Post hysterectomy.  MUSCULOSKELETAL: No pathologic FDG activity.  No suspicious lesions on CT imaging.                  Impression   CONCLUSION:  1. 2 cm irregular hypermetabolic right apical pulmonary nodule compatible with bronchogenic carcinoma.  Hypermetabolic lower right paratracheal lymph node suspicious for metastatic disease.  2. Nonspecific 6 mm right upper lobe pulmonary nodule is not FDG avid, but this could be related to nodule size.    3. Fax     EBUS 12/27/23:   Final Diagnosis:      Right upper lobe lung mass; transbronchial biopsy:   Invasive adenocarcinoma with features consistent with focal squamous differentiation (see comment).     Final Diagnosis:      A. Right upper lobe lung mass; fine needle aspiration:   Adequacy: Satisfactory for evaluation  General Category: Atypical cellular changes  Diagnosis:  Rare atypical epithelial cells present  Numerous benign bronchial epithelial cells present  Numerous histiocytes present  No definitive evidence of malignancy identified     B. Right upper lobe lung; bronchoalveolar lavage:  Adequacy: Satisfactory for evaluation  General Category: Benign, inflammatory, reactive or reparative changes  Diagnosis:  Rare benign bronchial epithelial cells present   Rare histiocytes present  No malignant cells identified     C. Lymph node, station 4R; fine-needle aspiration:  Adequacy: Satisfactory for evaluation  General Category: Benign, inflammatory, reactive or reparative changes  Diagnosis:  Lymph node elements present  (see comment)  Benign bronchial epithelial cells present  Cartilage present  No malignant cells identified        PFTs 12/14/23: FEV1 93%, DLCO 59%     Assessment/Plan:     Ms. Gibson is a 53 year old female current smoker presenting with right upper lobe adenocarcinoma.     This was found on imaging for COVID on 11/6/23 showing a right upper lobe nodule measuring 2.9x1.8x1.6cm with enlarged right hilar lymph node. Subsequent PET showed the right upper lobe nodule had a max SUV of 4.6 and a right paratracheal lymph node had a max SUV of 4.4. EBUS by Dr. Rivera  showed the right upper lobe nodule was positive for adenocarcinoma and station 4R lymph node was negative for malignancy. She was referred by Dr. Mccurdy to discuss surgery.     Discussed options including right VATS upper lobectomy. Discussed the risks and benefits of surgery. Patients PFTs are adequate but strongly encouraged smoking cessation. Patient expressed understanding and would like to proceed with surgery.      -Encouraged smoking cessation  -Scheduled for right VATS upper lobectomy at [facility] on 1/29/24 with Dr. Fong  -Pre op labs ordered    Katie Nolan PA-C  Thoracic Surgery    I have seen and examined that patient and agree with the above assessment and plan.  I have personally reviewed all the pertinent imaging, discussed the case in thoracic oncology tumor board and with the consulting physician.     Ms. Gibson  is a 53 year old female who had a right upper lobe lung nodule found on a CT scan done for covid.  This 2.9cm lesion was further evaluated by PET scan which showed it to have an SUV of 4.6 with no signs of local or regional spread of disease.  These findings were concerning for a early stage lung cancer and thus a biopsy was done.  The biopsy proved adenocarcinoma. I described for Ms. Gibson a minimally invasive, VATS lobectomy as treatment.  Her pulmonary function tests suggest that she has adequate lung function to undergo the proposed surgery.  I went over the alternatives (radiation) and the risks, including: bleeding, infection, prolonged air leak, nerve injury, MI, stroke, arrhythmia, pneumonia and death. She understands these risks and wishes to proceed.  We will plan on surgery January 29th at [facility].    Matthew Fong MD  Thoracic Surgery  Pager [phone]    I spent 80 minutes on this visit which included: review of tests, independently interpreting results, obtaining history, counseling on additional tests and procedures, communicating with the consulting physician,   care coordination and surgery, its risks and alternatives as described in the above assessment and plan.

## 2024-01-10 NOTE — CONSULTS
Thoracic Surgery Consult Note     Name: Emy Gibson   Age: 53 year old   Sex: female.   MRN: [id number]    Reason for Consultation: right upper lobe adenocarcinoma     Consulting Physician: Dr. Mccurdy    Subjective:     Chief Complaint: \"I have lung cancer\"     History of Present Illness:   Ms. Gibson is a 53 year old female current smoker presenting with right upper lobe adenocarcinoma.     This was found on imaging for COVID on 11/6/23 showing a right upper lobe nodule measuring 2.9x1.8x1.6cm with enlarged right hilar lymph node. Subsequent PET showed the right upper lobe nodule had a max SUV of 4.6 and a right paratracheal lymph node had a max SUV of 4.4. EBUS by Dr. Rivera showed the right upper lobe nodule was positive for adenocarcinoma and station 4R lymph node was negative for malignancy. She was referred by Dr. Mccurdy to discuss surgery.     Patient feels well. 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 She can go up at least two flights of stairs without dyspnea. She has a chronic cough from post nasal drip. Patient denies any chest pain, fevers, chills, weight loss, changes in vision, headache, or new bone pain.     PMH includes COVID 11/2023. No heart problems. No blood thinners. No prior thoracic surgeries. She is trying to quit smoking. She is down to a half a pack per day. Was smoking 2 ppd with 70+pyh. Patient has a family history of lung cancer with her mother.     Review Of Systems:   10 point review of systems was conducted and was negative except for the pertinent positives listed in the above HPI.    Past Medical History:   Past Medical History:   Diagnosis Date    H/O: hysterectomy     Shortness of breath        Past Surgical History:   Past Surgical History:   Procedure Laterality Date    HYSTEROSCOPY      TONSILLECTOMY         Social History:   Social History     Socioeconomic History    Marital status:      Spouse name: Not on file    Number of children: Not on file    Years of  education: Not on file    Highest education level: Not on file   Occupational History    Not on file   Tobacco Use    Smoking status: Every Day     Packs/day: .5     Types: Cigarettes    Smokeless tobacco: Never   Vaping Use    Vaping Use: Never used   Substance and Sexual Activity    Alcohol use: Not Currently    Drug use: Not Currently    Sexual activity: Not on file   Other Topics Concern    Not on file   Social History Narrative    Not on file     Social Determinants of Health     Financial Resource Strain: Not on file   Food Insecurity: Not on file   Transportation Needs: Not on file   Physical Activity: Not on file   Stress: Not on file   Social Connections: Not on file   Housing Stability: Not on file       Family History: No family history on file.    Allergies:  Allergies   Allergen Reactions    Singulair [Montelukast] SWELLING    Pollen UNKNOWN       Medications:   Current Outpatient Medications on File Prior to Visit   Medication Sig Dispense Refill    albuterol 108 (90 Base) MCG/ACT Inhalation Aero Soln Inhale 2 puffs into the lungs every 6 (six) hours as needed for Wheezing. inhale 2 puff by inhalation route  every 4 - 6 hours as needed (Patient not taking: Reported on 1/4/2024) 1 each 0    meclizine 25 MG Oral Tab Take 1 tablet (25 mg total) by mouth 3 (three) times daily as needed. (Patient not taking: Reported on 11/22/2023) 21 tablet 0     Current Facility-Administered Medications on File Prior to Visit   Medication Dose Route Frequency Provider Last Rate Last Admin    [COMPLETED] ipratropium-albuterol (Duoneb) 0.5-2.5 (3) MG/3ML inhalation solution 3 mL  3 mL Nebulization Once Rojelio Rivera, DO   3 mL at 12/27/23 0943     No current facility-administered medications on file as of 1/11/2024.         Objective:      Vital Signs:  /42 (BP Location: Left arm, Patient Position: Sitting, Cuff Size: adult)   Pulse 76   Temp 97.7 °F (36.5 °C) (Oral)   Resp 16   Ht 1.778 m (5' 10\")   Wt 86.2  kg (190 lb)   SpO2 98%   BMI 27.26 kg/m²     Physical Exam:  General: well appearing female in no acute distress  HEENT: Normocephalic, PERRL, EOMI, no scleral icterus  Neck: Supple, trachea midline, no JVD, no masses. Thyroid not grossly enlarged  Nodes: no cervical or supraclavicular lymphadenopathy appreciated  Heart: regular rate and rhythm. No murmurs, rubs or gallops. No lower extremity edema.  Lungs: Normal respiratory effort. Clear to ascultation bilaterally.   Abdomen: Soft, Non-tender, non-distended. No hepatosplenomegaly noted.  Extremities: No clubbing or cyanosis. No lateralizing weakness  Neuro: No gross cranial nerve defects, no loss of sensation  Psych:  oriented to person place and time, normal mood and affect      Labs:   Lab Results   Component Value Date/Time    WBC 5.1 11/06/2023 03:37 PM    HGB 16.0 11/06/2023 03:37 PM    HCT 46.5 11/06/2023 03:37 PM    .0 11/06/2023 03:37 PM    MCV 95.7 11/06/2023 03:37 PM     Lab Results   Component Value Date/Time     (L) 11/06/2023 03:37 PM    K 3.6 11/06/2023 03:37 PM    CL 99 11/06/2023 03:37 PM    CO2 24.0 11/06/2023 03:37 PM    BUN 10 11/06/2023 03:37 PM    GLU 82 11/06/2023 03:37 PM    CA 9.3 11/06/2023 03:37 PM     No results found for: \"INR\", \"PT\", \"PTT\"  No components found for: \"TROPI\"  No results found for: \"ALB\", \"TP\", \"ALT\", \"AST\", \"RODNEY\", \"LIPASE\"      Review of Data:   CT chest 11/6/23  FINDINGS:  VASCULATURE: There is adequate opacification of the pulmonary arterial tree. No suspicious filling defects are identified in the main, lobar, segmental, or proximal subsegmental pulmonary artery branches to suggest acute pulmonary embolism. The distal  subsegmental branches are less well assessed. The main pulmonary artery trunk is normal in caliber, measuring 1.7 cm.  CARDIAC: The heart is not enlarged. There is no bowing of the interventricular septum to suggest right ventricular strain.  There is no pericardial effusion.  THORACIC  AORTA: Unremarkable configuration without aneurysm or dissection.  There is mild calcific atherosclerosis in the aortic arch.  LUNGS/PLEURA: There is moderate upper lobe predominant centrilobular emphysema with mild biapical subpleural bleb formation.  There is redemonstration of a nodular opacity at the right lung apex measuring 2.9 x 1.8 x 1.6 cm (series 4, image 18 and series   6, image 65).  This nodule demonstrates an elongated configuration in the craniocaudad direction.  Associated reticulation extends to the adjacent pleural surface.  This finding is superimposed on mild biapical pleural/parenchymal scarring.  Additional noncalcified pulmonary nodules are seen.  Specifically, there is a 7 mm subpleural nodule in the lateral aspect of the right upper lobe adjacent to the major fissure (series 4, image 70).  Smaller subpleural nodules are seen in the lateral  aspect of the right upper lobe more superiorly measuring up to 4 mm (series 4, image 54).  A few smaller subpleural nodules are also seen in the left upper lobe.  There is no pleural effusion or pneumothorax.  AIRWAYS: The tracheobronchial tree is without central mass or obstructing lesion.  MEDIASTINUM/AARON: There is a mildly enlarged right hilar lymph node measuring 1.2 cm short axis.  There are small subcarinal and left hilar node seen that are not pathologic by CT size criteria.  CHEST WALL: No axillary mass or lymphadenopathy.    LIMITED ABDOMEN: Within the parameters of the arterial phase of contrast-enhancement, the included upper abdomen is unremarkable.    BONES: No bony lesion or fracture is seen.    OTHER: Negative.               Impression   CONCLUSION:  1. Negative for pulmonary embolism.  2. No findings to indicate acute viral pneumonia due to COVID-19.  3. Moderate emphysema with a nodular opacity at the right lung apex measuring 2.9 x 1.8 x 1.6 cm.  While the elongated configuration could relate to a focus of nodular scarring or bacterial  pneumonia, the possibility of a malignancy such as a primary  bronchogenic carcinoma cannot be excluded.  According to the 2017 guidelines from the Fleischner Society for the follow-up and management of incidentally detected indeterminate pulmonary nodules, please consider the following recommendations after  clinical assessment of risk factors: Consider CT at three months, PET-CT or tissue sampling. However, negative PET-CT does not exclude low-grade malignancy, FDG uptake may be underestimated in small nodules <1cm, or those close to the diaphragm.    4. Additional sub 6 mm noncalcified pulmonary nodules are seen in both upper lobes, which are nonspecific but are probably inflammatory in nature/due to small airways infection.  These nodules can be reassessed on the follow-up study recommended above.  5. Mildly enlarged right hilar lymph node and additional small middle mediastinal/left hilar lymph nodes that are not pathologic by CT size criteria.  These lymph nodes may all be benign/reactive in nature, although juan metastases cannot be excluded in   the setting of a primary bronchogenic carcinoma.     PET 12/8/23:   FINDINGS:  Mediastinal blood pool is 1 point max SUV, and hepatic blood pool is 2.6 max SUV.     HEAD/NECK: No pathologic FDG activity.  Bilateral maxillary sinus retention cysts.    LUNGS: Approximately 2 cm irregular right apical pulmonary nodule with SUV max of 4.6 .  A 6 mm peripheral posterior right upper lobe pulmonary nodule is not FDG-avid.  Emphysema.  MEDIASTINUM/AARON: 11 x 15 mm lower right paratracheal lymph node with SUV max of 4.4.  Small hiatal hernia.    CHEST WALL/AXILLA: No pathologic FDG activity.    ABDOMEN/PELVIS: No pathologic FDG activity.  Atherosclerotic vascular calcification.  Small fat containing umbilical hernia.  Atherosclerotic vascular calcification.  Post hysterectomy.  MUSCULOSKELETAL: No pathologic FDG activity.  No suspicious lesions on CT imaging.                  Impression   CONCLUSION:  1. 2 cm irregular hypermetabolic right apical pulmonary nodule compatible with bronchogenic carcinoma.  Hypermetabolic lower right paratracheal lymph node suspicious for metastatic disease.  2. Nonspecific 6 mm right upper lobe pulmonary nodule is not FDG avid, but this could be related to nodule size.    3. Fax     EBUS 12/27/23:   Final Diagnosis:      Right upper lobe lung mass; transbronchial biopsy:   Invasive adenocarcinoma with features consistent with focal squamous differentiation (see comment).     Final Diagnosis:      A. Right upper lobe lung mass; fine needle aspiration:   Adequacy: Satisfactory for evaluation  General Category: Atypical cellular changes  Diagnosis:  Rare atypical epithelial cells present  Numerous benign bronchial epithelial cells present  Numerous histiocytes present  No definitive evidence of malignancy identified     B. Right upper lobe lung; bronchoalveolar lavage:  Adequacy: Satisfactory for evaluation  General Category: Benign, inflammatory, reactive or reparative changes  Diagnosis:  Rare benign bronchial epithelial cells present   Rare histiocytes present  No malignant cells identified     C. Lymph node, station 4R; fine-needle aspiration:  Adequacy: Satisfactory for evaluation  General Category: Benign, inflammatory, reactive or reparative changes  Diagnosis:  Lymph node elements present  (see comment)  Benign bronchial epithelial cells present  Cartilage present  No malignant cells identified        PFTs 12/14/23: FEV1 93%, DLCO 59%     Assessment/Plan:     Ms. Gibson is a 53 year old female current smoker presenting with right upper lobe adenocarcinoma.     This was found on imaging for COVID on 11/6/23 showing a right upper lobe nodule measuring 2.9x1.8x1.6cm with enlarged right hilar lymph node. Subsequent PET showed the right upper lobe nodule had a max SUV of 4.6 and a right paratracheal lymph node had a max SUV of 4.4. EBUS by Dr. Rivera  showed the right upper lobe nodule was positive for adenocarcinoma and station 4R lymph node was negative for malignancy. She was referred by Dr. Mccurdy to discuss surgery.     Discussed options including right VATS upper lobectomy. Discussed the risks and benefits of surgery. Patients PFTs are adequate but strongly encouraged smoking cessation. Patient expressed understanding and would like to proceed with surgery.      -Encouraged smoking cessation  -Scheduled for right VATS upper lobectomy at [facility] on 1/29/24 with Dr. Fong  -Pre op labs ordered    Katie Nolan PA-C  Thoracic Surgery    I have seen and examined that patient and agree with the above assessment and plan.  I have personally reviewed all the pertinent imaging, discussed the case in thoracic oncology tumor board and with the consulting physician.     Ms. Gibson  is a 53 year old female who had a right upper lobe lung nodule found on a CT scan done for covid.  This 2.9cm lesion was further evaluated by PET scan which showed it to have an SUV of 4.6 with no signs of local or regional spread of disease.  These findings were concerning for a early stage lung cancer and thus a biopsy was done.  The biopsy proved adenocarcinoma. I described for Ms. Gibson a minimally invasive, VATS lobectomy as treatment.  Her pulmonary function tests suggest that she has adequate lung function to undergo the proposed surgery.  I went over the alternatives (radiation) and the risks, including: bleeding, infection, prolonged air leak, nerve injury, MI, stroke, arrhythmia, pneumonia and death. She understands these risks and wishes to proceed.  We will plan on surgery January 29th at [facility].    Matthew Fong MD  Thoracic Surgery  Pager [phone]    I spent 80 minutes on this visit which included: review of tests, independently interpreting results, obtaining history, counseling on additional tests and procedures, communicating with the consulting physician,   care coordination and surgery, its risks and alternatives as described in the above assessment and plan.

## 2024-01-10 NOTE — H&P (VIEW-ONLY)
Thoracic Surgery Consult Note     Name: Emy Gibson   Age: 53 year old   Sex: female.   MRN: [id number]    Reason for Consultation: right upper lobe adenocarcinoma     Consulting Physician: Dr. Mccurdy    Subjective:     Chief Complaint: \"I have lung cancer\"     History of Present Illness:   Ms. Gibson is a 53 year old female current smoker presenting with right upper lobe adenocarcinoma.     This was found on imaging for COVID on 11/6/23 showing a right upper lobe nodule measuring 2.9x1.8x1.6cm with enlarged right hilar lymph node. Subsequent PET showed the right upper lobe nodule had a max SUV of 4.6 and a right paratracheal lymph node had a max SUV of 4.4. EBUS by Dr. Rivera showed the right upper lobe nodule was positive for adenocarcinoma and station 4R lymph node was negative for malignancy. She was referred by Dr. Mccurdy to discuss surgery.     Patient feels well. 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 She can go up at least two flights of stairs without dyspnea. She has a chronic cough from post nasal drip. Patient denies any chest pain, fevers, chills, weight loss, changes in vision, headache, or new bone pain.     PMH includes COVID 11/2023. No heart problems. No blood thinners. No prior thoracic surgeries. She is trying to quit smoking. She is down to a half a pack per day. Was smoking 2 ppd with 70+pyh. Patient has a family history of lung cancer with her mother.     Review Of Systems:   10 point review of systems was conducted and was negative except for the pertinent positives listed in the above HPI.    Past Medical History:   Past Medical History:   Diagnosis Date    H/O: hysterectomy     Shortness of breath        Past Surgical History:   Past Surgical History:   Procedure Laterality Date    HYSTEROSCOPY      TONSILLECTOMY         Social History:   Social History     Socioeconomic History    Marital status:      Spouse name: Not on file    Number of children: Not on file    Years of  education: Not on file    Highest education level: Not on file   Occupational History    Not on file   Tobacco Use    Smoking status: Every Day     Packs/day: .5     Types: Cigarettes    Smokeless tobacco: Never   Vaping Use    Vaping Use: Never used   Substance and Sexual Activity    Alcohol use: Not Currently    Drug use: Not Currently    Sexual activity: Not on file   Other Topics Concern    Not on file   Social History Narrative    Not on file     Social Determinants of Health     Financial Resource Strain: Not on file   Food Insecurity: Not on file   Transportation Needs: Not on file   Physical Activity: Not on file   Stress: Not on file   Social Connections: Not on file   Housing Stability: Not on file       Family History: No family history on file.    Allergies:  Allergies   Allergen Reactions    Singulair [Montelukast] SWELLING    Pollen UNKNOWN       Medications:   Current Outpatient Medications on File Prior to Visit   Medication Sig Dispense Refill    albuterol 108 (90 Base) MCG/ACT Inhalation Aero Soln Inhale 2 puffs into the lungs every 6 (six) hours as needed for Wheezing. inhale 2 puff by inhalation route  every 4 - 6 hours as needed (Patient not taking: Reported on 1/4/2024) 1 each 0    meclizine 25 MG Oral Tab Take 1 tablet (25 mg total) by mouth 3 (three) times daily as needed. (Patient not taking: Reported on 11/22/2023) 21 tablet 0     Current Facility-Administered Medications on File Prior to Visit   Medication Dose Route Frequency Provider Last Rate Last Admin    [COMPLETED] ipratropium-albuterol (Duoneb) 0.5-2.5 (3) MG/3ML inhalation solution 3 mL  3 mL Nebulization Once Rojelio Rivera, DO   3 mL at 12/27/23 0943     No current facility-administered medications on file as of 1/11/2024.         Objective:      Vital Signs:  /42 (BP Location: Left arm, Patient Position: Sitting, Cuff Size: adult)   Pulse 76   Temp 97.7 °F (36.5 °C) (Oral)   Resp 16   Ht 1.778 m (5' 10\")   Wt 86.2  kg (190 lb)   SpO2 98%   BMI 27.26 kg/m²     Physical Exam:  General: well appearing female in no acute distress  HEENT: Normocephalic, PERRL, EOMI, no scleral icterus  Neck: Supple, trachea midline, no JVD, no masses. Thyroid not grossly enlarged  Nodes: no cervical or supraclavicular lymphadenopathy appreciated  Heart: regular rate and rhythm. No murmurs, rubs or gallops. No lower extremity edema.  Lungs: Normal respiratory effort. Clear to ascultation bilaterally.   Abdomen: Soft, Non-tender, non-distended. No hepatosplenomegaly noted.  Extremities: No clubbing or cyanosis. No lateralizing weakness  Neuro: No gross cranial nerve defects, no loss of sensation  Psych:  oriented to person place and time, normal mood and affect      Labs:   Lab Results   Component Value Date/Time    WBC 5.1 11/06/2023 03:37 PM    HGB 16.0 11/06/2023 03:37 PM    HCT 46.5 11/06/2023 03:37 PM    .0 11/06/2023 03:37 PM    MCV 95.7 11/06/2023 03:37 PM     Lab Results   Component Value Date/Time     (L) 11/06/2023 03:37 PM    K 3.6 11/06/2023 03:37 PM    CL 99 11/06/2023 03:37 PM    CO2 24.0 11/06/2023 03:37 PM    BUN 10 11/06/2023 03:37 PM    GLU 82 11/06/2023 03:37 PM    CA 9.3 11/06/2023 03:37 PM     No results found for: \"INR\", \"PT\", \"PTT\"  No components found for: \"TROPI\"  No results found for: \"ALB\", \"TP\", \"ALT\", \"AST\", \"RODNEY\", \"LIPASE\"      Review of Data:   CT chest 11/6/23  FINDINGS:  VASCULATURE: There is adequate opacification of the pulmonary arterial tree. No suspicious filling defects are identified in the main, lobar, segmental, or proximal subsegmental pulmonary artery branches to suggest acute pulmonary embolism. The distal  subsegmental branches are less well assessed. The main pulmonary artery trunk is normal in caliber, measuring 1.7 cm.  CARDIAC: The heart is not enlarged. There is no bowing of the interventricular septum to suggest right ventricular strain.  There is no pericardial effusion.  THORACIC  AORTA: Unremarkable configuration without aneurysm or dissection.  There is mild calcific atherosclerosis in the aortic arch.  LUNGS/PLEURA: There is moderate upper lobe predominant centrilobular emphysema with mild biapical subpleural bleb formation.  There is redemonstration of a nodular opacity at the right lung apex measuring 2.9 x 1.8 x 1.6 cm (series 4, image 18 and series   6, image 65).  This nodule demonstrates an elongated configuration in the craniocaudad direction.  Associated reticulation extends to the adjacent pleural surface.  This finding is superimposed on mild biapical pleural/parenchymal scarring.  Additional noncalcified pulmonary nodules are seen.  Specifically, there is a 7 mm subpleural nodule in the lateral aspect of the right upper lobe adjacent to the major fissure (series 4, image 70).  Smaller subpleural nodules are seen in the lateral  aspect of the right upper lobe more superiorly measuring up to 4 mm (series 4, image 54).  A few smaller subpleural nodules are also seen in the left upper lobe.  There is no pleural effusion or pneumothorax.  AIRWAYS: The tracheobronchial tree is without central mass or obstructing lesion.  MEDIASTINUM/AARON: There is a mildly enlarged right hilar lymph node measuring 1.2 cm short axis.  There are small subcarinal and left hilar node seen that are not pathologic by CT size criteria.  CHEST WALL: No axillary mass or lymphadenopathy.    LIMITED ABDOMEN: Within the parameters of the arterial phase of contrast-enhancement, the included upper abdomen is unremarkable.    BONES: No bony lesion or fracture is seen.    OTHER: Negative.               Impression   CONCLUSION:  1. Negative for pulmonary embolism.  2. No findings to indicate acute viral pneumonia due to COVID-19.  3. Moderate emphysema with a nodular opacity at the right lung apex measuring 2.9 x 1.8 x 1.6 cm.  While the elongated configuration could relate to a focus of nodular scarring or bacterial  pneumonia, the possibility of a malignancy such as a primary  bronchogenic carcinoma cannot be excluded.  According to the 2017 guidelines from the Fleischner Society for the follow-up and management of incidentally detected indeterminate pulmonary nodules, please consider the following recommendations after  clinical assessment of risk factors: Consider CT at three months, PET-CT or tissue sampling. However, negative PET-CT does not exclude low-grade malignancy, FDG uptake may be underestimated in small nodules <1cm, or those close to the diaphragm.    4. Additional sub 6 mm noncalcified pulmonary nodules are seen in both upper lobes, which are nonspecific but are probably inflammatory in nature/due to small airways infection.  These nodules can be reassessed on the follow-up study recommended above.  5. Mildly enlarged right hilar lymph node and additional small middle mediastinal/left hilar lymph nodes that are not pathologic by CT size criteria.  These lymph nodes may all be benign/reactive in nature, although juan metastases cannot be excluded in   the setting of a primary bronchogenic carcinoma.     PET 12/8/23:   FINDINGS:  Mediastinal blood pool is 1 point max SUV, and hepatic blood pool is 2.6 max SUV.     HEAD/NECK: No pathologic FDG activity.  Bilateral maxillary sinus retention cysts.    LUNGS: Approximately 2 cm irregular right apical pulmonary nodule with SUV max of 4.6 .  A 6 mm peripheral posterior right upper lobe pulmonary nodule is not FDG-avid.  Emphysema.  MEDIASTINUM/AARON: 11 x 15 mm lower right paratracheal lymph node with SUV max of 4.4.  Small hiatal hernia.    CHEST WALL/AXILLA: No pathologic FDG activity.    ABDOMEN/PELVIS: No pathologic FDG activity.  Atherosclerotic vascular calcification.  Small fat containing umbilical hernia.  Atherosclerotic vascular calcification.  Post hysterectomy.  MUSCULOSKELETAL: No pathologic FDG activity.  No suspicious lesions on CT imaging.                  Impression   CONCLUSION:  1. 2 cm irregular hypermetabolic right apical pulmonary nodule compatible with bronchogenic carcinoma.  Hypermetabolic lower right paratracheal lymph node suspicious for metastatic disease.  2. Nonspecific 6 mm right upper lobe pulmonary nodule is not FDG avid, but this could be related to nodule size.    3. Fax     EBUS 12/27/23:   Final Diagnosis:      Right upper lobe lung mass; transbronchial biopsy:   Invasive adenocarcinoma with features consistent with focal squamous differentiation (see comment).     Final Diagnosis:      A. Right upper lobe lung mass; fine needle aspiration:   Adequacy: Satisfactory for evaluation  General Category: Atypical cellular changes  Diagnosis:  Rare atypical epithelial cells present  Numerous benign bronchial epithelial cells present  Numerous histiocytes present  No definitive evidence of malignancy identified     B. Right upper lobe lung; bronchoalveolar lavage:  Adequacy: Satisfactory for evaluation  General Category: Benign, inflammatory, reactive or reparative changes  Diagnosis:  Rare benign bronchial epithelial cells present   Rare histiocytes present  No malignant cells identified     C. Lymph node, station 4R; fine-needle aspiration:  Adequacy: Satisfactory for evaluation  General Category: Benign, inflammatory, reactive or reparative changes  Diagnosis:  Lymph node elements present  (see comment)  Benign bronchial epithelial cells present  Cartilage present  No malignant cells identified        PFTs 12/14/23: FEV1 93%, DLCO 59%     Assessment/Plan:     Ms. Gibson is a 53 year old female current smoker presenting with right upper lobe adenocarcinoma.     This was found on imaging for COVID on 11/6/23 showing a right upper lobe nodule measuring 2.9x1.8x1.6cm with enlarged right hilar lymph node. Subsequent PET showed the right upper lobe nodule had a max SUV of 4.6 and a right paratracheal lymph node had a max SUV of 4.4. EBUS by Dr. Rivera  showed the right upper lobe nodule was positive for adenocarcinoma and station 4R lymph node was negative for malignancy. She was referred by Dr. Mccurdy to discuss surgery.     Discussed options including right VATS upper lobectomy. Discussed the risks and benefits of surgery. Patients PFTs are adequate but strongly encouraged smoking cessation. Patient expressed understanding and would like to proceed with surgery.      -Encouraged smoking cessation  -Scheduled for right VATS upper lobectomy at [place] on 1/29/24 with Dr. Fong  -Pre op labs ordered    Katie Nolan PA-C  Thoracic Surgery    I have seen and examined that patient and agree with the above assessment and plan.  I have personally reviewed all the pertinent imaging, discussed the case in thoracic oncology tumor board and with the consulting physician.     Ms. Gbison  is a 53 year old female who had a right upper lobe lung nodule found on a CT scan done for covid.  This 2.9cm lesion was further evaluated by PET scan which showed it to have an SUV of 4.6 with no signs of local or regional spread of disease.  These findings were concerning for a early stage lung cancer and thus a biopsy was done.  The biopsy proved adenocarcinoma. I described for Ms. Gibson a minimally invasive, VATS lobectomy as treatment.  Her pulmonary function tests suggest that she has adequate lung function to undergo the proposed surgery.  I went over the alternatives (radiation) and the risks, including: bleeding, infection, prolonged air leak, nerve injury, MI, stroke, arrhythmia, pneumonia and death. She understands these risks and wishes to proceed.  We will plan on surgery January 29th at [facility].    Matthew Fong MD  Thoracic Surgery  Pager [phone]    I spent 80 minutes on this visit which included: review of tests, independently interpreting results, obtaining history, counseling on additional tests and procedures, communicating with the consulting physician,   care coordination and surgery, its risks and alternatives as described in the above assessment and plan.

## 2024-01-11 ENCOUNTER — TELEPHONE (OUTPATIENT)
Dept: PULMONOLOGY | Facility: CLINIC | Age: 54
End: 2024-01-11

## 2024-01-11 ENCOUNTER — OFFICE VISIT (OUTPATIENT)
Dept: HEMATOLOGY/ONCOLOGY | Facility: HOSPITAL | Age: 54
End: 2024-01-11
Attending: THORACIC SURGERY (CARDIOTHORACIC VASCULAR SURGERY)
Payer: COMMERCIAL

## 2024-01-11 VITALS
RESPIRATION RATE: 16 BRPM | SYSTOLIC BLOOD PRESSURE: 101 MMHG | WEIGHT: 190 LBS | TEMPERATURE: 98 F | HEIGHT: 70 IN | DIASTOLIC BLOOD PRESSURE: 42 MMHG | BODY MASS INDEX: 27.2 KG/M2 | OXYGEN SATURATION: 98 % | HEART RATE: 76 BPM

## 2024-01-11 DIAGNOSIS — C34.91 ADENOCARCINOMA OF RIGHT LUNG (HCC): Primary | ICD-10-CM

## 2024-01-11 NOTE — TELEPHONE ENCOUNTER
Patient requesting to speak with RN regarding smoking cessation and to requesting for rx for patches to help her stop smoking.  Please call.  Thank you.

## 2024-01-12 ENCOUNTER — TELEPHONE (OUTPATIENT)
Dept: PULMONOLOGY | Facility: CLINIC | Age: 54
End: 2024-01-12

## 2024-01-12 RX ORDER — NICOTINE 21 MG/24HR
PATCH, TRANSDERMAL 24 HOURS TRANSDERMAL
Qty: 14 PATCH | Refills: 0 | Status: SHIPPED | OUTPATIENT
Start: 2024-01-12

## 2024-01-16 ENCOUNTER — TELEPHONE (OUTPATIENT)
Dept: PULMONOLOGY | Facility: CLINIC | Age: 54
End: 2024-01-16

## 2024-01-16 RX ORDER — BUPROPION HYDROCHLORIDE 150 MG/1
150 TABLET, FILM COATED, EXTENDED RELEASE ORAL 2 TIMES DAILY
Qty: 60 TABLET | Refills: 0 | Status: SHIPPED | OUTPATIENT
Start: 2024-01-16 | End: 2024-02-15

## 2024-01-19 NOTE — PAT NURSING NOTE
PAT nursing note: denies taking ACEs, ARBs, ASA, blood thinners, diabetic or weight loss medications; no PPM, ICD or spinal cord stimulator; patient verbalized understanding of all instructions.      Pre-Surgical Instructions for 1/29/24      Pre-Surgical Testing:     -You are scheduled for a COVID-19 test, non-fasting blood work and an EKG on Friday, 1/26 starting at 7:30 am at the [facility], located [address] [place] [address].    -If the COVID-19 test is refused or is positive, the surgery will be cancelled.      Visitor Instructions    -Visitors are welcome to accompany you the day of surgery.  -Visiting hours are 7:00 am to 8:00 pm.       Pre-Op Instructions    Scheduled Surgery: You are scheduled for Thoracic Surgery with Dr. Fong      Date of Procedure: Monday, 01/29/24      Diet Instructions:    -Do not eat or drink after 10:00 pm. This includes water, gum, candy and food.      Medication Instructions:    CONTINUE to take your prescribed medications as directed EXCEPT:    - Do not take any medications the morning of your surgery.        Medications to Stop:     -Stop all vitamins, supplements, herbal products and NSAID's (ex. Ibuprofen, Excederin, Aleve, Diclofenac, and any gels having steroids) starting 1/21.        Skin Prep:     -Shower with Dial Soap the morning of your surgery (or any antibacterial soap).       Time of Arrival: 11:00 am     -This is going to be a tentative time of arrival for surgery.   -We will call you the buisness day prior to your surgery in the late afternoon to reconfirm your arrival.   -Please check your voicemail for a message.      Admit/Discharge Status:     -You will be admitted to the hospital after surgery.      Discharge Teaching:     -Your nurse will give you specific instructions before discharge.  -Any questions, please call the surgeon's office.      Additional Instructions:     -Bring insurance card(s) and picture ID.  -Leave all  valuables at home, including jewelry.  -Wear comfortable clothing.  -No contacts, wear glasses.  -You'll receive arrival time 1 business day prior to scheduled surgery.    -Park in the [facility] parking garage at [place], [address] [place] [address].    -Check in at the [facility] reception desk in the [facility].   -Our  will be there to check you in for your procedure.   -Complimentary  parking is available starting at 6:00 am.      If you are scheduled to arrive early for 5:30 am, the [facility] reception desk does not open till 5:30 am. It may be dark, but you are in the correct location.     We are open M-F from 8am- 5pm. We are closed on holidays and weekends. We can be reached at [phone].     Thank you

## 2024-01-26 ENCOUNTER — LAB ENCOUNTER (OUTPATIENT)
Dept: LAB | Age: 54
End: 2024-01-26
Attending: STUDENT IN AN ORGANIZED HEALTH CARE EDUCATION/TRAINING PROGRAM
Payer: COMMERCIAL

## 2024-01-26 ENCOUNTER — EKG ENCOUNTER (OUTPATIENT)
Dept: LAB | Age: 54
End: 2024-01-26
Attending: THORACIC SURGERY (CARDIOTHORACIC VASCULAR SURGERY)
Payer: COMMERCIAL

## 2024-01-26 DIAGNOSIS — C34.91 ADENOCARCINOMA OF RIGHT LUNG (HCC): ICD-10-CM

## 2024-01-26 DIAGNOSIS — Z01.818 PRE-OP TESTING: ICD-10-CM

## 2024-01-26 LAB
ANION GAP SERPL CALC-SCNC: 8 MMOL/L (ref 0–18)
ANTIBODY SCREEN: NEGATIVE
ATRIAL RATE: 64 BPM
BASOPHILS # BLD AUTO: 0.07 X10(3) UL (ref 0–0.2)
BASOPHILS NFR BLD AUTO: 0.9 %
BUN BLD-MCNC: 9 MG/DL (ref 9–23)
BUN/CREAT SERPL: 9.5 (ref 10–20)
CALCIUM BLD-MCNC: 9.6 MG/DL (ref 8.7–10.4)
CHLORIDE SERPL-SCNC: 109 MMOL/L (ref 98–112)
CO2 SERPL-SCNC: 26 MMOL/L (ref 21–32)
CREAT BLD-MCNC: 0.95 MG/DL
DEPRECATED RDW RBC AUTO: 46.3 FL (ref 35.1–46.3)
EGFRCR SERPLBLD CKD-EPI 2021: 72 ML/MIN/1.73M2 (ref 60–?)
EOSINOPHIL # BLD AUTO: 0.12 X10(3) UL (ref 0–0.7)
EOSINOPHIL NFR BLD AUTO: 1.6 %
ERYTHROCYTE [DISTWIDTH] IN BLOOD BY AUTOMATED COUNT: 12.9 % (ref 11–15)
FASTING STATUS PATIENT QL REPORTED: NO
GLUCOSE BLD-MCNC: 106 MG/DL (ref 70–99)
HCT VFR BLD AUTO: 42.6 %
HGB BLD-MCNC: 14.5 G/DL
IMM GRANULOCYTES # BLD AUTO: 0.03 X10(3) UL (ref 0–1)
IMM GRANULOCYTES NFR BLD: 0.4 %
LYMPHOCYTES # BLD AUTO: 2.19 X10(3) UL (ref 1–4)
LYMPHOCYTES NFR BLD AUTO: 28.4 %
MCH RBC QN AUTO: 33.1 PG (ref 26–34)
MCHC RBC AUTO-ENTMCNC: 34 G/DL (ref 31–37)
MCV RBC AUTO: 97.3 FL
MONOCYTES # BLD AUTO: 0.49 X10(3) UL (ref 0.1–1)
MONOCYTES NFR BLD AUTO: 6.4 %
NEUTROPHILS # BLD AUTO: 4.81 X10 (3) UL (ref 1.5–7.7)
NEUTROPHILS # BLD AUTO: 4.81 X10(3) UL (ref 1.5–7.7)
NEUTROPHILS NFR BLD AUTO: 62.3 %
OSMOLALITY SERPL CALC.SUM OF ELEC: 295 MOSM/KG (ref 275–295)
P AXIS: 51 DEGREES
P-R INTERVAL: 136 MS
PLATELET # BLD AUTO: 262 10(3)UL (ref 150–450)
POTASSIUM SERPL-SCNC: 4.4 MMOL/L (ref 3.5–5.1)
Q-T INTERVAL: 432 MS
QRS DURATION: 102 MS
QTC CALCULATION (BEZET): 445 MS
R AXIS: 61 DEGREES
RBC # BLD AUTO: 4.38 X10(6)UL
RH BLOOD TYPE: POSITIVE
SODIUM SERPL-SCNC: 143 MMOL/L (ref 136–145)
T AXIS: 52 DEGREES
VENTRICULAR RATE: 64 BPM
WBC # BLD AUTO: 7.7 X10(3) UL (ref 4–11)

## 2024-01-26 PROCEDURE — 86920 COMPATIBILITY TEST SPIN: CPT

## 2024-01-26 PROCEDURE — 86850 RBC ANTIBODY SCREEN: CPT

## 2024-01-26 PROCEDURE — 80048 BASIC METABOLIC PNL TOTAL CA: CPT

## 2024-01-26 PROCEDURE — 86900 BLOOD TYPING SEROLOGIC ABO: CPT

## 2024-01-26 PROCEDURE — 85025 COMPLETE CBC W/AUTO DIFF WBC: CPT

## 2024-01-26 PROCEDURE — 93010 ELECTROCARDIOGRAM REPORT: CPT | Performed by: INTERNAL MEDICINE

## 2024-01-26 PROCEDURE — 86901 BLOOD TYPING SEROLOGIC RH(D): CPT

## 2024-01-26 PROCEDURE — 87635 SARS-COV-2 COVID-19 AMP PRB: CPT

## 2024-01-26 PROCEDURE — 93005 ELECTROCARDIOGRAM TRACING: CPT

## 2024-01-26 PROCEDURE — 36415 COLL VENOUS BLD VENIPUNCTURE: CPT

## 2024-01-27 LAB — SARS-COV-2 RNA RESP QL NAA+PROBE: NOT DETECTED

## 2024-01-29 ENCOUNTER — HOSPITAL ENCOUNTER (INPATIENT)
Facility: HOSPITAL | Age: 54
LOS: 1 days | Discharge: HOME | End: 2024-01-29
Attending: THORACIC SURGERY (CARDIOTHORACIC VASCULAR SURGERY) | Admitting: THORACIC SURGERY (CARDIOTHORACIC VASCULAR SURGERY)
Payer: COMMERCIAL

## 2024-01-29 VITALS
RESPIRATION RATE: 19 BRPM | WEIGHT: 194 LBS | BODY MASS INDEX: 27.77 KG/M2 | OXYGEN SATURATION: 98 % | HEIGHT: 70 IN | DIASTOLIC BLOOD PRESSURE: 63 MMHG | HEART RATE: 64 BPM | SYSTOLIC BLOOD PRESSURE: 104 MMHG | TEMPERATURE: 98 F

## 2024-01-29 DIAGNOSIS — C34.91 ADENOCARCINOMA OF RIGHT LUNG (HCC): Primary | ICD-10-CM

## 2024-01-29 DIAGNOSIS — Z01.818 PRE-OP TESTING: ICD-10-CM

## 2024-01-30 LAB
BLOOD TYPE BARCODE: 6200
UNIT VOLUME: 350 ML

## 2024-01-30 NOTE — INTERVAL H&P NOTE
Patient here for lung resection.  Procedure canceled and rescheduled due to urgent intraoperative concerns

## 2024-01-31 NOTE — PAT NURSING NOTE
Pre-Surgical Instructions for 2/8/24       Pre-Surgical Testing:      -You are scheduled for a COVID-19 test, and nonfasting blood test (type and screen) on Monday 2/5 at 7:30 am at the [facility], located [address] [place] [address]     -If the COVID-19 test is refused or is positive, the surgery will be cancelled.        Visitor Instructions     -Visitors are welcome to accompany you the day of surgery.  -Visiting hours are 7:00 am to 8:00 pm.        Pre-Op Instructions     Scheduled Surgery: You are scheduled for Thoracic Surgery with Dr. Fong        Date of Procedure: Thursday 2/8/24        Diet Instructions:     -Do not eat or drink after 10:00 pm. This includes water, gum, candy and food.        Medication Instructions:     CONTINUE to take your prescribed medications as directed EXCEPT:     - Do not take any medications the morning of your surgery.        Medications to Stop:      -Stop all vitamins, supplements, herbal products and NSAID's (ex. Ibuprofen, Excederin, Aleve, Diclofenac, and any gels having steroids) starting now     Skin Prep:      -Shower with Dial Soap the morning of your surgery (or any antibacterial soap).        Time of Arrival: 5:45 am     -This is going to be a tentative time of arrival for surgery.   -We will call you the business day prior to your surgery in the late afternoon to reconfirm your arrival.   -Please check your voicemail for a message.        Admit/Discharge Status:      -You will be admitted to the hospital after surgery.        Discharge Teaching:      -Your nurse will give you specific instructions before discharge.  -Any questions, please call the surgeon's office.        Additional Instructions:      -Bring insurance card(s) and picture ID.  -Leave all valuables at home, including jewelry.  -Wear comfortable clothing.  -No contacts, wear glasses.  -You'll receive arrival time 1 business day prior to scheduled surgery.     -Park in the  [facility] parking garage at [facility], [address] [place] [address].    -Check in at the [facility] reception desk in the Lobby.   -Our  will be there to check you in for your procedure.   -Complimentary  parking is available starting at 6:00 am.        If you are scheduled to arrive early for 5:30 am, the [facility] reception desk does not open till 5:30 am. It may be dark, but you are in the correct location.      We are open M-F from 8am- 5pm. We are closed on holidays and weekends. We can be reached at [phone].

## 2024-02-05 ENCOUNTER — LAB ENCOUNTER (OUTPATIENT)
Dept: LAB | Age: 54
End: 2024-02-05
Attending: THORACIC SURGERY (CARDIOTHORACIC VASCULAR SURGERY)
Payer: COMMERCIAL

## 2024-02-05 DIAGNOSIS — C34.91 ADENOCARCINOMA OF RIGHT LUNG (HCC): ICD-10-CM

## 2024-02-05 DIAGNOSIS — Z01.818 PRE-OP TESTING: ICD-10-CM

## 2024-02-05 DIAGNOSIS — Z91.89 AT HIGH RISK FOR BLEEDING: ICD-10-CM

## 2024-02-05 LAB
ANTIBODY SCREEN: NEGATIVE
RH BLOOD TYPE: POSITIVE

## 2024-02-05 PROCEDURE — 86901 BLOOD TYPING SEROLOGIC RH(D): CPT

## 2024-02-05 PROCEDURE — 86900 BLOOD TYPING SEROLOGIC ABO: CPT

## 2024-02-05 PROCEDURE — 36415 COLL VENOUS BLD VENIPUNCTURE: CPT

## 2024-02-05 PROCEDURE — 86850 RBC ANTIBODY SCREEN: CPT

## 2024-02-05 PROCEDURE — 87635 SARS-COV-2 COVID-19 AMP PRB: CPT

## 2024-02-06 LAB — SARS-COV-2 RNA RESP QL NAA+PROBE: NOT DETECTED

## 2024-02-07 RX ORDER — ALBUTEROL SULFATE 90 UG/1
2 AEROSOL, METERED RESPIRATORY (INHALATION) EVERY 6 HOURS PRN
Qty: 8.5 G | Refills: 5 | Status: SHIPPED | OUTPATIENT
Start: 2024-02-07

## 2024-02-08 ENCOUNTER — ANESTHESIA EVENT (OUTPATIENT)
Dept: CARDIAC SURGERY | Facility: HOSPITAL | Age: 54
End: 2024-02-08
Payer: COMMERCIAL

## 2024-02-08 ENCOUNTER — HOSPITAL ENCOUNTER (INPATIENT)
Facility: HOSPITAL | Age: 54
LOS: 4 days | Discharge: HOME | End: 2024-02-12
Attending: THORACIC SURGERY (CARDIOTHORACIC VASCULAR SURGERY) | Admitting: THORACIC SURGERY (CARDIOTHORACIC VASCULAR SURGERY)
Payer: COMMERCIAL

## 2024-02-08 ENCOUNTER — APPOINTMENT (OUTPATIENT)
Dept: GENERAL RADIOLOGY | Facility: HOSPITAL | Age: 54
End: 2024-02-08
Attending: THORACIC SURGERY (CARDIOTHORACIC VASCULAR SURGERY)
Payer: COMMERCIAL

## 2024-02-08 ENCOUNTER — ANESTHESIA (OUTPATIENT)
Dept: CARDIAC SURGERY | Facility: HOSPITAL | Age: 54
End: 2024-02-08
Payer: COMMERCIAL

## 2024-02-08 DIAGNOSIS — C34.91 NON-SMALL CELL CANCER OF RIGHT LUNG (HCC): Primary | ICD-10-CM

## 2024-02-08 DIAGNOSIS — C34.91 ADENOCARCINOMA OF RIGHT LUNG (HCC): ICD-10-CM

## 2024-02-08 DIAGNOSIS — Z01.818 PRE-OP TESTING: ICD-10-CM

## 2024-02-08 DIAGNOSIS — G89.18 ACUTE POST-OPERATIVE PAIN: ICD-10-CM

## 2024-02-08 DIAGNOSIS — Z91.89 AT HIGH RISK FOR BLEEDING: ICD-10-CM

## 2024-02-08 PROBLEM — D72.829 LEUKOCYTOSIS: Status: ACTIVE | Noted: 2024-02-08

## 2024-02-08 PROBLEM — I95.9 HYPOTENSION: Status: ACTIVE | Noted: 2024-02-08

## 2024-02-08 LAB
ERYTHROCYTE [DISTWIDTH] IN BLOOD BY AUTOMATED COUNT: 12.8 %
GLUCOSE BLD-MCNC: 116 MG/DL (ref 70–99)
HCT VFR BLD AUTO: 36.7 %
HGB BLD-MCNC: 12.5 G/DL
MCH RBC QN AUTO: 33.3 PG (ref 26–34)
MCHC RBC AUTO-ENTMCNC: 34.1 G/DL (ref 31–37)
MCV RBC AUTO: 97.9 FL
MRSA DNA SPEC QL NAA+PROBE: NEGATIVE
PLATELET # BLD AUTO: 235 10(3)UL (ref 150–450)
RBC # BLD AUTO: 3.75 X10(6)UL
WBC # BLD AUTO: 16.2 X10(3) UL (ref 4–11)

## 2024-02-08 PROCEDURE — 3E0T3BZ INTRODUCTION OF ANESTHETIC AGENT INTO PERIPHERAL NERVES AND PLEXI, PERCUTANEOUS APPROACH: ICD-10-PCS | Performed by: THORACIC SURGERY (CARDIOTHORACIC VASCULAR SURGERY)

## 2024-02-08 PROCEDURE — 99233 SBSQ HOSP IP/OBS HIGH 50: CPT | Performed by: INTERNAL MEDICINE

## 2024-02-08 PROCEDURE — 0BTC4ZZ RESECTION OF RIGHT UPPER LUNG LOBE, PERCUTANEOUS ENDOSCOPIC APPROACH: ICD-10-PCS | Performed by: THORACIC SURGERY (CARDIOTHORACIC VASCULAR SURGERY)

## 2024-02-08 PROCEDURE — 71045 X-RAY EXAM CHEST 1 VIEW: CPT | Performed by: THORACIC SURGERY (CARDIOTHORACIC VASCULAR SURGERY)

## 2024-02-08 PROCEDURE — 07T74ZZ RESECTION OF THORAX LYMPHATIC, PERCUTANEOUS ENDOSCOPIC APPROACH: ICD-10-PCS | Performed by: THORACIC SURGERY (CARDIOTHORACIC VASCULAR SURGERY)

## 2024-02-08 PROCEDURE — 0BJ08ZZ INSPECTION OF TRACHEOBRONCHIAL TREE, VIA NATURAL OR ARTIFICIAL OPENING ENDOSCOPIC: ICD-10-PCS | Performed by: THORACIC SURGERY (CARDIOTHORACIC VASCULAR SURGERY)

## 2024-02-08 PROCEDURE — 99223 1ST HOSP IP/OBS HIGH 75: CPT | Performed by: INTERNAL MEDICINE

## 2024-02-08 RX ORDER — ACETAMINOPHEN 10 MG/ML
1000 INJECTION, SOLUTION INTRAVENOUS EVERY 6 HOURS
Status: COMPLETED | OUTPATIENT
Start: 2024-02-08 | End: 2024-02-10

## 2024-02-08 RX ORDER — BISACODYL 10 MG
10 SUPPOSITORY, RECTAL RECTAL
Status: DISCONTINUED | OUTPATIENT
Start: 2024-02-08 | End: 2024-02-12

## 2024-02-08 RX ORDER — HYDROMORPHONE HYDROCHLORIDE 1 MG/ML
0.8 INJECTION, SOLUTION INTRAMUSCULAR; INTRAVENOUS; SUBCUTANEOUS EVERY 2 HOUR PRN
Status: DISCONTINUED | OUTPATIENT
Start: 2024-02-08 | End: 2024-02-12

## 2024-02-08 RX ORDER — HYDROMORPHONE HYDROCHLORIDE 1 MG/ML
0.2 INJECTION, SOLUTION INTRAMUSCULAR; INTRAVENOUS; SUBCUTANEOUS EVERY 5 MIN PRN
Status: DISPENSED | OUTPATIENT
Start: 2024-02-08 | End: 2024-02-08

## 2024-02-08 RX ORDER — ACETAMINOPHEN 10 MG/ML
INJECTION, SOLUTION INTRAVENOUS AS NEEDED
Status: DISCONTINUED | OUTPATIENT
Start: 2024-02-08 | End: 2024-02-08 | Stop reason: SURG

## 2024-02-08 RX ORDER — PROCHLORPERAZINE EDISYLATE 5 MG/ML
5 INJECTION INTRAMUSCULAR; INTRAVENOUS EVERY 8 HOURS PRN
Status: DISCONTINUED | OUTPATIENT
Start: 2024-02-08 | End: 2024-02-09

## 2024-02-08 RX ORDER — ONDANSETRON 2 MG/ML
4 INJECTION INTRAMUSCULAR; INTRAVENOUS EVERY 6 HOURS PRN
Status: DISCONTINUED | OUTPATIENT
Start: 2024-02-08 | End: 2024-02-12

## 2024-02-08 RX ORDER — BUPIVACAINE HYDROCHLORIDE 2.5 MG/ML
INJECTION, SOLUTION EPIDURAL; INFILTRATION; INTRACAUDAL AS NEEDED
Status: DISCONTINUED | OUTPATIENT
Start: 2024-02-08 | End: 2024-02-08 | Stop reason: HOSPADM

## 2024-02-08 RX ORDER — SENNOSIDES 8.6 MG
17.2 TABLET ORAL NIGHTLY PRN
Status: DISCONTINUED | OUTPATIENT
Start: 2024-02-08 | End: 2024-02-12

## 2024-02-08 RX ORDER — POLYETHYLENE GLYCOL 3350 17 G/17G
17 POWDER, FOR SOLUTION ORAL DAILY PRN
Status: DISCONTINUED | OUTPATIENT
Start: 2024-02-08 | End: 2024-02-11

## 2024-02-08 RX ORDER — NICOTINE 21 MG/24HR
1 PATCH, TRANSDERMAL 24 HOURS TRANSDERMAL DAILY PRN
Status: DISCONTINUED | OUTPATIENT
Start: 2024-02-08 | End: 2024-02-12

## 2024-02-08 RX ORDER — CEFAZOLIN SODIUM/WATER 2 G/20 ML
SYRINGE (ML) INTRAVENOUS AS NEEDED
Status: DISCONTINUED | OUTPATIENT
Start: 2024-02-08 | End: 2024-02-08 | Stop reason: SURG

## 2024-02-08 RX ORDER — MECLIZINE HCL 12.5 MG/1
25 TABLET ORAL 3 TIMES DAILY PRN
Status: DISCONTINUED | OUTPATIENT
Start: 2024-02-08 | End: 2024-02-12

## 2024-02-08 RX ORDER — SODIUM CHLORIDE 9 MG/ML
INJECTION, SOLUTION INTRAVENOUS CONTINUOUS PRN
Status: DISCONTINUED | OUTPATIENT
Start: 2024-02-08 | End: 2024-02-08 | Stop reason: SURG

## 2024-02-08 RX ORDER — ONDANSETRON 2 MG/ML
INJECTION INTRAMUSCULAR; INTRAVENOUS AS NEEDED
Status: DISCONTINUED | OUTPATIENT
Start: 2024-02-08 | End: 2024-02-08 | Stop reason: SURG

## 2024-02-08 RX ORDER — PROCHLORPERAZINE EDISYLATE 5 MG/ML
5 INJECTION INTRAMUSCULAR; INTRAVENOUS EVERY 8 HOURS PRN
Status: DISCONTINUED | OUTPATIENT
Start: 2024-02-08 | End: 2024-02-12

## 2024-02-08 RX ORDER — HEPARIN SODIUM 5000 [USP'U]/ML
5000 INJECTION, SOLUTION INTRAVENOUS; SUBCUTANEOUS EVERY 8 HOURS SCHEDULED
Status: DISCONTINUED | OUTPATIENT
Start: 2024-02-08 | End: 2024-02-12

## 2024-02-08 RX ORDER — KETOROLAC TROMETHAMINE 30 MG/ML
INJECTION, SOLUTION INTRAMUSCULAR; INTRAVENOUS AS NEEDED
Status: DISCONTINUED | OUTPATIENT
Start: 2024-02-08 | End: 2024-02-08 | Stop reason: SURG

## 2024-02-08 RX ORDER — IPRATROPIUM BROMIDE AND ALBUTEROL SULFATE 2.5; .5 MG/3ML; MG/3ML
3 SOLUTION RESPIRATORY (INHALATION) EVERY 6 HOURS PRN
Status: DISCONTINUED | OUTPATIENT
Start: 2024-02-08 | End: 2024-02-12

## 2024-02-08 RX ORDER — ONDANSETRON 2 MG/ML
4 INJECTION INTRAMUSCULAR; INTRAVENOUS EVERY 6 HOURS PRN
Status: DISCONTINUED | OUTPATIENT
Start: 2024-02-08 | End: 2024-02-08

## 2024-02-08 RX ORDER — PHENYLEPHRINE HCL 10 MG/ML
VIAL (ML) INJECTION AS NEEDED
Status: DISCONTINUED | OUTPATIENT
Start: 2024-02-08 | End: 2024-02-08 | Stop reason: SURG

## 2024-02-08 RX ORDER — CALCIUM CARBONATE 500 MG/1
1000 TABLET, CHEWABLE ORAL 3 TIMES DAILY PRN
Status: DISCONTINUED | OUTPATIENT
Start: 2024-02-08 | End: 2024-02-12

## 2024-02-08 RX ORDER — HYDROMORPHONE HYDROCHLORIDE 1 MG/ML
0.6 INJECTION, SOLUTION INTRAMUSCULAR; INTRAVENOUS; SUBCUTANEOUS EVERY 5 MIN PRN
Status: ACTIVE | OUTPATIENT
Start: 2024-02-08 | End: 2024-02-08

## 2024-02-08 RX ORDER — BUPROPION HYDROCHLORIDE 150 MG/1
150 TABLET, EXTENDED RELEASE ORAL 2 TIMES DAILY
Status: DISCONTINUED | OUTPATIENT
Start: 2024-02-08 | End: 2024-02-12

## 2024-02-08 RX ORDER — SODIUM CHLORIDE 9 MG/ML
INJECTION, SOLUTION INTRAVENOUS CONTINUOUS
Status: DISCONTINUED | OUTPATIENT
Start: 2024-02-08 | End: 2024-02-09

## 2024-02-08 RX ORDER — MEPERIDINE HYDROCHLORIDE 25 MG/ML
12.5 INJECTION INTRAMUSCULAR; INTRAVENOUS; SUBCUTANEOUS AS NEEDED
Status: DISCONTINUED | OUTPATIENT
Start: 2024-02-08 | End: 2024-02-12

## 2024-02-08 RX ORDER — ALBUTEROL SULFATE 90 UG/1
2 AEROSOL, METERED RESPIRATORY (INHALATION) EVERY 6 HOURS PRN
Status: DISCONTINUED | OUTPATIENT
Start: 2024-02-08 | End: 2024-02-12

## 2024-02-08 RX ORDER — HYDROMORPHONE HYDROCHLORIDE 1 MG/ML
0.4 INJECTION, SOLUTION INTRAMUSCULAR; INTRAVENOUS; SUBCUTANEOUS EVERY 5 MIN PRN
Status: ACTIVE | OUTPATIENT
Start: 2024-02-08 | End: 2024-02-08

## 2024-02-08 RX ORDER — DEXAMETHASONE SODIUM PHOSPHATE 4 MG/ML
VIAL (ML) INJECTION AS NEEDED
Status: DISCONTINUED | OUTPATIENT
Start: 2024-02-08 | End: 2024-02-08 | Stop reason: SURG

## 2024-02-08 RX ORDER — SODIUM CHLORIDE, SODIUM LACTATE, POTASSIUM CHLORIDE, CALCIUM CHLORIDE 600; 310; 30; 20 MG/100ML; MG/100ML; MG/100ML; MG/100ML
INJECTION, SOLUTION INTRAVENOUS CONTINUOUS
Status: DISCONTINUED | OUTPATIENT
Start: 2024-02-08 | End: 2024-02-09

## 2024-02-08 RX ORDER — ENEMA 19; 7 G/133ML; G/133ML
1 ENEMA RECTAL ONCE AS NEEDED
Status: DISCONTINUED | OUTPATIENT
Start: 2024-02-08 | End: 2024-02-12

## 2024-02-08 RX ORDER — MIDAZOLAM HYDROCHLORIDE 1 MG/ML
1 INJECTION INTRAMUSCULAR; INTRAVENOUS EVERY 5 MIN PRN
Status: ACTIVE | OUTPATIENT
Start: 2024-02-08 | End: 2024-02-08

## 2024-02-08 RX ORDER — EPHEDRINE SULFATE 50 MG/ML
INJECTION INTRAVENOUS AS NEEDED
Status: DISCONTINUED | OUTPATIENT
Start: 2024-02-08 | End: 2024-02-08 | Stop reason: SURG

## 2024-02-08 RX ORDER — DIPHENHYDRAMINE HYDROCHLORIDE 50 MG/ML
12.5 INJECTION INTRAMUSCULAR; INTRAVENOUS AS NEEDED
Status: ACTIVE | OUTPATIENT
Start: 2024-02-08 | End: 2024-02-08

## 2024-02-08 RX ORDER — NALOXONE HYDROCHLORIDE 0.4 MG/ML
0.08 INJECTION, SOLUTION INTRAMUSCULAR; INTRAVENOUS; SUBCUTANEOUS AS NEEDED
Status: ACTIVE | OUTPATIENT
Start: 2024-02-08 | End: 2024-02-08

## 2024-02-08 RX ORDER — OXYCODONE HYDROCHLORIDE 10 MG/1
10 TABLET ORAL EVERY 4 HOURS PRN
Status: DISCONTINUED | OUTPATIENT
Start: 2024-02-08 | End: 2024-02-12

## 2024-02-08 RX ORDER — HYDROMORPHONE HYDROCHLORIDE 1 MG/ML
0.4 INJECTION, SOLUTION INTRAMUSCULAR; INTRAVENOUS; SUBCUTANEOUS EVERY 2 HOUR PRN
Status: DISCONTINUED | OUTPATIENT
Start: 2024-02-08 | End: 2024-02-12

## 2024-02-08 RX ORDER — OXYCODONE HYDROCHLORIDE 5 MG/1
5 TABLET ORAL EVERY 4 HOURS PRN
Status: DISCONTINUED | OUTPATIENT
Start: 2024-02-08 | End: 2024-02-12

## 2024-02-08 RX ORDER — ROCURONIUM BROMIDE 10 MG/ML
INJECTION, SOLUTION INTRAVENOUS AS NEEDED
Status: DISCONTINUED | OUTPATIENT
Start: 2024-02-08 | End: 2024-02-08 | Stop reason: SURG

## 2024-02-08 RX ORDER — LIDOCAINE HYDROCHLORIDE 10 MG/ML
INJECTION, SOLUTION EPIDURAL; INFILTRATION; INTRACAUDAL; PERINEURAL AS NEEDED
Status: DISCONTINUED | OUTPATIENT
Start: 2024-02-08 | End: 2024-02-08 | Stop reason: SURG

## 2024-02-08 RX ORDER — CEFAZOLIN SODIUM/WATER 2 G/20 ML
2 SYRINGE (ML) INTRAVENOUS EVERY 8 HOURS
Status: COMPLETED | OUTPATIENT
Start: 2024-02-08 | End: 2024-02-09

## 2024-02-08 RX ORDER — HEPARIN SODIUM 5000 [USP'U]/ML
5000 INJECTION, SOLUTION INTRAVENOUS; SUBCUTANEOUS ONCE
Status: DISCONTINUED | OUTPATIENT
Start: 2024-02-08 | End: 2024-02-08 | Stop reason: HOSPADM

## 2024-02-08 RX ADMIN — KETOROLAC TROMETHAMINE 30 MG: 30 INJECTION, SOLUTION INTRAMUSCULAR; INTRAVENOUS at 09:14:00

## 2024-02-08 RX ADMIN — CEFAZOLIN SODIUM/WATER 2 G: 2 G/20 ML SYRINGE (ML) INTRAVENOUS at 07:37:00

## 2024-02-08 RX ADMIN — ROCURONIUM BROMIDE 80 MG: 10 INJECTION, SOLUTION INTRAVENOUS at 07:22:00

## 2024-02-08 RX ADMIN — DEXAMETHASONE SODIUM PHOSPHATE 8 MG: 4 MG/ML VIAL (ML) INJECTION at 07:30:00

## 2024-02-08 RX ADMIN — SODIUM CHLORIDE: 9 INJECTION, SOLUTION INTRAVENOUS at 09:24:00

## 2024-02-08 RX ADMIN — ONDANSETRON 4 MG: 2 INJECTION INTRAMUSCULAR; INTRAVENOUS at 09:35:00

## 2024-02-08 RX ADMIN — SODIUM CHLORIDE: 9 INJECTION, SOLUTION INTRAVENOUS at 07:18:00

## 2024-02-08 RX ADMIN — LIDOCAINE HYDROCHLORIDE 50 MG: 10 INJECTION, SOLUTION EPIDURAL; INFILTRATION; INTRACAUDAL; PERINEURAL at 07:22:00

## 2024-02-08 RX ADMIN — ROCURONIUM BROMIDE 10 MG: 10 INJECTION, SOLUTION INTRAVENOUS at 08:52:00

## 2024-02-08 RX ADMIN — PHENYLEPHRINE HCL 100 MCG: 10 MG/ML VIAL (ML) INJECTION at 08:43:00

## 2024-02-08 RX ADMIN — ROCURONIUM BROMIDE 10 MG: 10 INJECTION, SOLUTION INTRAVENOUS at 08:20:00

## 2024-02-08 RX ADMIN — ACETAMINOPHEN 1000 MG: 10 INJECTION, SOLUTION INTRAVENOUS at 07:55:00

## 2024-02-08 RX ADMIN — EPHEDRINE SULFATE 5 MG: 50 INJECTION INTRAVENOUS at 08:32:00

## 2024-02-08 NOTE — ANESTHESIA POSTPROCEDURE EVALUATION
[facility]    Emy Gibson Patient Status:  Inpatient   Age/Gender 53 year old female MRN [id number]   Location [facility] 6NE-A Attending Matthew Fong Jr., MD   Hosp Day # 0 PCP None Pcp       Anesthesia Post-op Note    FLEXIBLE BRONSCHOSCOPY, RIGHT VIDEO-ASSISTED THORACOSCOPIC UPPER LOBECTOMY, MEDIASTINAL LYMPH NODE DISSECTION    Procedure Summary       Date: 02/08/24 Room / Location:  [facility] 01 /  [facility]    Anesthesia Start: 0718 Anesthesia Stop: 1000    Procedure: FLEXIBLE BRONSCHOSCOPY, RIGHT VIDEO-ASSISTED THORACOSCOPIC UPPER LOBECTOMY, MEDIASTINAL LYMPH NODE DISSECTION (Right) Diagnosis: (adenocarcinoma of right lung)    Surgeons: Matthew Fong Jr., MD Anesthesiologist: Dane Stiles MD    Anesthesia Type: general ASA Status: 3            Anesthesia Type: general    Vitals Value Taken Time   /72 02/08/24 1000   Temp 97 02/08/24 1000   Pulse 71 02/08/24 1000   Resp 17 02/08/24 1000   SpO2 99 % 02/08/24 1000       Patient Location: ICU    Anesthesia Type: general    Airway Patency: patent    Postop Pain Control: adequate    Mental Status: mildly sedated but able to meaningfully participate in the post-anesthesia evaluation    Nausea/Vomiting: none    Cardiopulmonary/Hydration status: stable euvolemic    Complications: no apparent anesthesia related complications    Postop vital signs: stable    Dental Exam: Unchanged from Preop    Sign out given to ICU staff.

## 2024-02-08 NOTE — ANESTHESIA PROCEDURE NOTES
Arterial Line    Date/Time: 2/8/2024 7:37 AM    Performed by: Dane Stiles MD  Authorized by: Dane Stiles MD    General Information and Staff    Procedure Start:  2/8/2024 7:37 AM  Procedure End:  2/8/2024 7:37 AM  Anesthesiologist:  Dane Stiles MD  Performed By:  Anesthesiologist  Patient Location:  OR  Indication: continuous blood pressure monitoring and blood sampling needed    Site Identification: real time ultrasound guided and image stored and retrievable    Preanesthetic Checklist: 2 patient identifiers, IV checked, risks and benefits discussed, monitors and equipment checked, pre-op evaluation, timeout performed, anesthesia consent and sterile technique used    Procedure Details    Catheter Size:  20 G  Catheter Length:  1 and 3/4 inch  Catheter Type:  Arrow  Seldinger Technique?: Yes    Laterality:  Left  Site:  Radial artery  Site Prep: chlorhexidine    Line Secured:  Wrist Brace, tape and Tegaderm    Assessment    Events: patient tolerated procedure well with no complications      Medications  2/8/2024 7:37 AM      Additional Comments

## 2024-02-08 NOTE — ANESTHESIA PROCEDURE NOTES
Airway  Date/Time: 2/8/2024 7:27 AM  Urgency: elective      General Information and Staff    Patient location during procedure: OR  Anesthesiologist: Dane Stiles MD  Performed: anesthesiologist   Performed by: Dane Stiles MD  Authorized by: Dane Stiles MD      Indications and Patient Condition  Indications for airway management: anesthesia  Sedation level: deep  Preoxygenated: yes  Patient position: sniffing  Mask difficulty assessment: 1 - vent by mask    Final Airway Details  Final airway type: endotracheal airway      Successful airway: ETT and ETT - double lumen left  Cuffed: yes   Successful intubation technique: Video laryngoscopy  Endotracheal tube insertion site: oral  Blade: GlideScope  Blade size: #3  ETT DL size (fr): 37    Placement verified by: capnometry   Measured from: lips  Number of attempts at approach: 1    Additional Comments  atraumatic

## 2024-02-08 NOTE — PLAN OF CARE
Assumed care of patient around 1000. Patient from OR. Axox4. Following commands. Room air. NSR on the monitor. BP WNL, readings from arterial line in place. Ramos in place. Chest tube in place to suction. Incision on right side, see assessment.

## 2024-02-08 NOTE — ANESTHESIA PREPROCEDURE EVALUATION
PRE-OP EVALUATION    Patient Name: Emy Gibson    Admit Diagnosis: adenocarcinoma of right lung    Pre-op Diagnosis: adenocarcinoma of right lung    FLEXIBLE BRONSCHOSCOPY, RIGHT VIDEO-ASSISTED THORACOSCOPIC UPPER LOBECTOMY, MEDIASTINAL LYMPH NODE DISSECTION, POSSIBLE THORACOTOMY    Anesthesia Procedure: FLEXIBLE BRONSCHOSCOPY, RIGHT VIDEO-ASSISTED THORACOSCOPIC UPPER LOBECTOMY, MEDIASTINAL LYMPH NODE DISSECTION, POSSIBLE THORACOTOMY (Right)    Surgeon(s) and Role:     * Hetal Marino, Matthew AQUINO MD - Primary    Pre-op vitals reviewed.        Body mass index is 27.84 kg/m².    Current medications reviewed.  Hospital Medications:   heparin (Porcine) 5000 UNIT/ML injection 5,000 Units  5,000 Units Subcutaneous Once       Outpatient Medications:     Medications Prior to Admission   Medication Sig Dispense Refill Last Dose    buPROPion HCl ER, Smoking Det, 150 MG Oral Tablet 12 Hr Take 1 tablet (150 mg total) by mouth 2 (two) times daily. 60 tablet 0 2/7/2024    albuterol 108 (90 Base) MCG/ACT Inhalation Aero Soln Inhale 2 puffs into the lungs every 6 (six) hours as needed for Wheezing. 8.5 g 5 More than a month    nicotine 21 MG/24HR Transdermal Patch 24 Hr Apply 1 patch (21mg) daily for 2 weeks, then apply 1 patch (14mg) daily for 2 weeks, then apply 1 patch (7mg) daily for 2 weeks 14 patch 0 Unknown    meclizine 25 MG Oral Tab Take 1 tablet (25 mg total) by mouth 3 (three) times daily as needed. 21 tablet 0 More than a month       Allergies: Singulair [montelukast] and Pollen      Anesthesia Evaluation    Patient summary reviewed.    Anesthetic Complications  (-) history of anesthetic complications         GI/Hepatic/Renal                                 Cardiovascular                                                       Endo/Other                                  Pulmonary               (+) shortness of breath            Neuro/Psych                              Smoker, nodule RUL        Past Surgical History:    Procedure Laterality Date    HYSTEROSCOPY      TONSILLECTOMY       Social History     Socioeconomic History    Marital status:    Tobacco Use    Smoking status: Every Day     Packs/day: .5     Types: Cigarettes    Smokeless tobacco: Never   Vaping Use    Vaping Use: Never used   Substance and Sexual Activity    Alcohol use: Not Currently    Drug use: Not Currently     History   Drug Use Unknown     Available pre-op labs reviewed.  Lab Results   Component Value Date    WBC 7.7 01/26/2024    RBC 4.38 01/26/2024    HGB 14.5 01/26/2024    HCT 42.6 01/26/2024    MCV 97.3 01/26/2024    MCH 33.1 01/26/2024    MCHC 34.0 01/26/2024    RDW 12.9 01/26/2024    .0 01/26/2024     Lab Results   Component Value Date     01/26/2024    K 4.4 01/26/2024     01/26/2024    CO2 26.0 01/26/2024    BUN 9 01/26/2024    CREATSERUM 0.95 01/26/2024     (H) 01/26/2024    CA 9.6 01/26/2024            Airway      Mallampati: III  Mouth opening: 3 FB  TM distance: 4 - 6 cm   Cardiovascular             Dental             Pulmonary                     Other findings              ASA: 3   Plan: general  NPO status verified and     Post-procedure pain management plan discussed with surgeon and patient.    Comment: GETA/LMA discussed in detail.  Risk of complications discussed including but not limited to sore throat, cough, PONV discussed. Also, discussed risks including dental injury particularly on any weakened, treated or diseased teeth & pt wishes to proceed  All questions answered.    Plan/risks discussed with: patient  Use of blood product(s) discussed with: patient    Consented to blood products.          Present on Admission:  **None**

## 2024-02-08 NOTE — OPERATIVE REPORT
[facility]    PATIENT'S NAME: JOHANNY KELLEY   ATTENDING PHYSICIAN: Matthew Fong Jr, MD   OPERATING PHYSICIAN: Matthew Fong Jr, MD   PATIENT ACCOUNT#:   [id number]    LOCATION:  [address] [place] [address]  MEDICAL RECORD #:   [id number]       YOB: 1970  ADMISSION DATE:       02/08/2024      OPERATION DATE:  02/08/2024    OPERATIVE REPORT      PREOPERATIVE DIAGNOSIS:  Right upper lobe lung cancer.  POSTOPERATIVE DIAGNOSIS:  Right upper lobe lung cancer.  PROCEDURE:    1.   Flexible bronchoscopy.  2.   Right video-assisted thoracoscopic exploration.  3.   Right upper lobectomy.  4.   Mediastinal lymph node dissection.  5.   Multilevel intercostal nerve block.    ASSISTANT:  Katie Nolan PA-C      ANESTHESIA:  General dual-lumen endotracheal anesthesia.    ANESTHESIOLOGIST:  Dane Stiles MD     SPECIMENS:  Right upper lobe; level 2, 4, 7, 10, and 11 lymph nodes.    DRAINS:  28-Barbadian chest tube x1.      IMPLANTS:  None.    ESTIMATED BLOOD LOSS:  250 mL.    COMPLICATIONS:  None.    CONDITION:  Stable, will recover in the ICU.     INDICATIONS:  The patient is a 53-year-old woman who had a biopsy-proven right upper lobe lung cancer.  Additional staging workup failed to prove any additional sites of cancer.  She did have some relatively large lymph nodes, as well as an additional nodule in her right upper lobe.  For this reason, I felt that lobectomy was most appropriate for her.      FINDINGS:  Mass was found in the expected location.  I was unable to palpate the additional satellite nodule.  She had some large hilar and paratracheal lymph nodes which were removed.    OPERATIVE TECHNIQUE:  Patient was brought to the operating room, laid supine, and underwent general dual-lumen endotracheal anesthesia.  I performed a flexible bronchoscopy.  The trachea, mainstem bronchi, lobar and segmental bronchi were examined bilaterally.  This was a normal bronchoscopy.  No endobronchial lesions were seen.   She was then placed in left lateral decubitus position.  All pressure points were padded.  She was prepped and draped in usual sterile fashion.  A time-out was performed to confirm patient, side, and site of surgery.  I made a 2 cm incision in the inframammary crease in midclavicular line and entered the chest bluntly.  A camera through this incision revealed I was safely in the chest space.  I then made 3 additional incisions, which were my standard incisions for right-sided chest exploration.  An Thanh wound retractor was placed at the superior-most incision for removal of specimens.  I then explored the chest space.  There was no sign of pleural disease.  I did a multilevel intercostal nerve block.  I used 60 mL of 0.25% Marcaine; 5 mL was injected in each interspace 2 through 9 under direct visualization with thoracoscope for postoperative pain control.  I then began the lobectomy.  I did blunt dissection around the superior pulmonary vein.  In doing this, I was able to push the phrenic nerve away from the hilum.  I then dissected out the early branches of the superior pulmonary vein and found a branch going to the right middle lobe.  This was preserved.  I then did circumferential and sharp dissection around the vein going to the upper lobe.  I then created a tunnel around it with a right-angle clamp and then transected the right upper lobe vein with the Ethicon 35 mm powered vascular stapler.  With this taken, I turned my attention to the pulmonary artery.  In beginning the dissection of this, superior to it I found level 10 lymph nodes which were bluntly dissected free and sent for pathology.  I then did circumferential and sharp dissection around the truncus anterior branch of the pulmonary artery.  It had 2 early branches, and I got around both of them at the base.  I created a tunnel behind it with a right-angle clamp and then transected with the powered vascular stapler.  With this out of the way, I  turned my attention to the bronchus.  In front of the bronchus was a large collection of level 11 lymph nodes.  These were bluntly dissected free and sent for pathology.  Once they were out of the way, I did sharp dissection on the inferior edge of the right upper lobe bronchus.  Once I was through the other side, I brought a 45 mm Bullhead stapler in and transected the bronchus of the right upper lobe with a gold load.  This left the fissure as the only hilar attachment, and I transected this with sequential firings of the 45 mm stapler using gold loads.  With the right upper lobe entirely free, it was placed in an Lima bag and removed from the chest.  I then proceeded with a mediastinal lymph node dissection.  I reflected the lung anteriorly and opened up the subcarinal space.  Several level 7 lymph nodes were found and bluntly dissected free and sent for pathology.  Hemostasis was achieved with a combination of clips, electrocautery, and Surgicel.  Reflecting the lung posteriorly, I opened up the paratracheal lymph node space.  This was done by transecting the mediastinal pleura superior to the azygos vein and posterior to the SVC.  I found several enlarged lymph nodes in this area.  The lower ones were sent as level 4 lymph nodes, and as I worked cephalad, additional lymph nodes were dissected free and sent as level 2 lymph nodes.  Hemostasis was achieved at this site with clips, electrocautery, and Surgicel as well.  I then irrigated with warm water irrigation.  I checked for a bronchial stump air leak and for bleeding.  Neither was seen.  I did see a small amount of air leaking from the staple line along the parenchyma where I had taken the minor fissure.  I sprayed some Progel over the site in an effort to decrease any postoperative air leak.  I then placed a 28-Angolan chest tube posteriorly and secured it using 0 silk sutures.  Finally, I asked the anesthesiologist to reinflate the lung.  It reinflated well  and filled the chest space.  I then withdrew my camera and all instruments and closed the remaining incisions in 3 layers with 2 layers of 2-0 Vicryl and 1 of 4-0 Monocryl.  Patient tolerated the procedure well.  I was present for the entirety of the procedure.    Dictated By Matthew Fong Jr, MD  d: 02/08/2024 09:44:29  t: 02/08/2024 13:21:17  Job [id number]  JLL/

## 2024-02-08 NOTE — CONSULTS
[facility] Pulmonary Consult Note       Emy Gibson Patient Status:  Inpatient    1970 MRN [id number]   Location [facility] 6NE-A Attending Hetal Marino, Matthew AQUINO MD   Hosp Day # 0 PCP None Pcp     Reason for Consultation:  Lung cancer    History of Present Illness:  Emy Gibson is a a(n) 53 year old female followed by [facility] who presents for elective right upper lobectomy.  Patient had COVID back in 2023 and found a right upper lobe nodule at that time with enlarged hilar lymphadenopathy.  Subsequent PET showed right upper lobe nodule with SUV of 4.6 and right paratracheal lymph node with SUV of 4.4.  Patient successfully underwent right upper lobe lobectomy with mediastinal lymph node dissection.  She is seen in the cardiac ICU postoperatively.  Her pain is controlled.  Hurts on deep inspiration.    History:  Past Medical History:   Diagnosis Date    Cancer (HCC)     H/O: hysterectomy     Shortness of breath      Past Surgical History:   Procedure Laterality Date    HYSTEROSCOPY      TONSILLECTOMY       Family History   Problem Relation Age of Onset    Lung Disorder Mother       reports that she quit smoking about 2 weeks ago. Her smoking use included cigarettes. She smoked an average of 0.5 packs per day. She has never used smokeless tobacco. She reports that she does not currently use alcohol. She reports that she does not currently use drugs.    Allergies:  Allergies   Allergen Reactions    Singulair [Montelukast] SWELLING    Pollen OTHER (SEE COMMENTS)     Nasal congestion, runny nose       Medications:    Current Facility-Administered Medications:     lactated ringers infusion, , Intravenous, Continuous    lactated ringers IV bolus 500 mL, 500 mL, Intravenous, Once PRN    atropine 0.1 MG/ML injection 0.5 mg, 0.5 mg, Intravenous, PRN    naloxone (Narcan) 0.4 MG/ML injection 0.08 mg, 0.08 mg, Intravenous, PRN    fentaNYL (Sublimaze) 50 mcg/mL injection 25 mcg, 25 mcg,  Intravenous, Q5 Min PRN **OR** fentaNYL (Sublimaze) 50 mcg/mL injection 50 mcg, 50 mcg, Intravenous, Q5 Min PRN    HYDROmorphone (Dilaudid) 1 MG/ML injection 0.2 mg, 0.2 mg, Intravenous, Q5 Min PRN **OR** HYDROmorphone (Dilaudid) 1 MG/ML injection 0.4 mg, 0.4 mg, Intravenous, Q5 Min PRN **OR** HYDROmorphone (Dilaudid) 1 MG/ML injection 0.6 mg, 0.6 mg, Intravenous, Q5 Min PRN    prochlorperazine (Compazine) 10 MG/2ML injection 5 mg, 5 mg, Intravenous, Q8H PRN    midazolam (Versed) 2 MG/2ML injection 1 mg, 1 mg, Intravenous, Q5 Min PRN    diphenhydrAMINE (Benadryl) 50 mg/mL  injection 12.5 mg, 12.5 mg, Intravenous, PRN    meperidine (Demerol) 25 MG/ML injection 12.5 mg, 12.5 mg, Intravenous, PRN    nicotine (Nicoderm CQ) 21 MG/24HR patch 1 patch, 1 patch, Transdermal, Daily PRN    sodium chloride 0.9% infusion, , Intravenous, Continuous    heparin (Porcine) 5000 UNIT/ML injection 5,000 Units, 5,000 Units, Subcutaneous, Q8H EDILBERTO    acetaminophen (Ofirmev) 10 mg/mL infusion premix 1,000 mg, 1,000 mg, Intravenous, Q6H    oxyCODONE immediate release tab 5 mg, 5 mg, Oral, Q4H PRN **OR** oxyCODONE immediate release tab 10 mg, 10 mg, Oral, Q4H PRN    HYDROmorphone (Dilaudid) 1 MG/ML injection 0.4 mg, 0.4 mg, Intravenous, Q2H PRN **OR** HYDROmorphone (Dilaudid) 1 MG/ML injection 0.8 mg, 0.8 mg, Intravenous, Q2H PRN    polyethylene glycol (PEG 3350) (Miralax) 17 g oral packet 17 g, 17 g, Oral, Daily PRN    sennosides (Senokot) tab 17.2 mg, 17.2 mg, Oral, Nightly PRN    bisacodyl (Dulcolax) 10 MG rectal suppository 10 mg, 10 mg, Rectal, Daily PRN    fleet enema (Fleet) 7-19 GM/118ML rectal enema 133 mL, 1 enema, Rectal, Once PRN    ondansetron (Zofran) 4 MG/2ML injection 4 mg, 4 mg, Intravenous, Q6H PRN    prochlorperazine (Compazine) 10 MG/2ML injection 5 mg, 5 mg, Intravenous, Q8H PRN    calcium carbonate (Tums) chewable tab 1,000 mg, 1,000 mg, Oral, TID PRN    ceFAZolin (Ancef) 2 g in 20mL IV syringe premix, 2 g, Intravenous,  Q8H    ipratropium-albuterol (Duoneb) 0.5-2.5 (3) MG/3ML inhalation solution 3 mL, 3 mL, Nebulization, Q6H PRN    albuterol (Ventolin HFA) 108 (90 Base) MCG/ACT inhaler 2 puff, 2 puff, Inhalation, Q6H PRN    buPROPion SR (WELLBUTRIN SR) 12 hr tab 150 mg, 150 mg, Oral, BID    meclizine (Antivert) tab 25 mg, 25 mg, Oral, TID PRN    Review of Systems:   All other review of systems are negative.    Vital signs in last 24 hours:  Patient Vitals for the past 24 hrs:   BP Temp Temp src Pulse Resp SpO2 Weight   02/08/24 1530 -- -- -- 72 17 98 % --   02/08/24 1500 -- -- -- 71 18 98 % --   02/08/24 1430 (!) 61/28 -- -- 72 23 97 % --   02/08/24 1400 -- -- -- 70 17 100 % --   02/08/24 1300 -- -- -- 60 14 99 % --   02/08/24 1200 (!) 86/52 97.6 °F (36.4 °C) Temporal 66 18 98 % --   02/08/24 1100 96/64 -- -- 60 13 98 % --   02/08/24 1024 -- -- -- 56 15 100 % 202 lb 6.4 oz (91.8 kg)   02/08/24 1000 -- 97.9 °F (36.6 °C) Temporal 71 17 99 % --   02/08/24 0615 114/57 97.8 °F (36.6 °C) -- 66 11 95 % --       Intake/Output:    Intake/Output Summary (Last 24 hours) at 2/8/2024 1542  Last data filed at 2/8/2024 1511  Gross per 24 hour   Intake 1600 ml   Output 735 ml   Net 865 ml       Physical Exam:   General: alert, cooperative, oriented.  No respiratory distress.   Head: Normocephalic, without obvious abnormality, atraumatic.   Eyes: Conjunctivae/corneas clear.  No scleral icterus.  No conjunctival     hemorrhage.   Nose: Nares normal.   Throat: Lips, mucosa, and tongue normal.  No thrush noted.   Neck: Soft, supple neck; trachea midline, no adenopathy, no thyromegaly.   Lungs: clear to auscultation bilaterally   Chest wall: No tenderness or deformity.   Heart: Regular rate and rhythm, normal S1S2, no murmur.   Abdomen: soft, non-tender, non-distended, no masses, no guarding, no     rebound, positive BS.   Extremity: no edema, no cyanosis   Skin: No rashes or lesions.   Neurological: Alert, interactive, no focal deficits    Lab Data  Review:  Recent Labs   Lab 02/08/24  1451   WBC 16.2*   HGB 12.5   HCT 36.7   .0       No results for input(s): \"NA\", \"K\", \"CL\", \"CO2\", \"BUN\", \"CREATININE\", \"BILITOT\", \"ALKPHOS\", \"ALT\", \"AST\", \"GLUCOSE\" in the last 168 hours.    Invalid input(s): \"CALCIUM\", \"LABALBU\", \"PROT\"    No results for input(s): \"MG\" in the last 168 hours.    No results found for: \"PHOS\", \"PHOSPHORUS\"     No results for input(s): \"PT\", \"INR\", \"PTT\" in the last 168 hours.    No results for input(s): \"ABGPHT\", \"JJHARH2L\", \"WKKXY6N\", \"ABGHCO3\", \"ABGBE\", \"TEMP\", \"NIDIA\", \"SITE\", \"DEV\", \"THGB\" in the last 168 hours.    Invalid input(s): \"TYD70DIM\", \"CHOB\"    Invalid input(s): \"CKTOTAL\", \"TROPONINI\", \"TROPONINT\", \"CKMBINDEX\"      Cultures:   No results found for this visit on 02/08/24.    Radiology:  XR CHEST AP PORTABLE  (CPT=71045)  Narrative: PROCEDURE:  XR CHEST AP PORTABLE  (CPT=71045)     TECHNIQUE:  AP chest radiograph was obtained.     COMPARISON:  None.     INDICATIONS:  hypotension post lung resection     PATIENT STATED HISTORY: (As transcribed by Technologist)  Patient offered no additional history at this time.          FINDINGS:  Lung volumes are satisfactory.  There is a surgical right chest tube.  No measurable pneumothorax.  Mild air along the right mid to lower chest wall.  Mild atelectasis at the left base.  Heart and pulmonary vessels are normal caliber.    Mediastinal contours are smooth.  Numerous right suprahilar-hilar surgical clips are noted.                      Impression: CONCLUSION:  Mild atelectasis at the left base.        LOCATION:  [facility]                 Dictated by (CST): Umang Briseno MD on 2/08/2024 at 3:13 PM       Finalized by (CST): Umang Briseno MD on 2/08/2024 at 3:14 PM         Assessment:  Non-small cell lung adenocarcinoma with hilar lymphadenopathy status post right upper lobectomy with hilar lymph node dissection      Plan:  Post op care started  Encouraged use of IS  Pain control to avoid  splinting  Management of chest tube per thoracic surgery, remove when ok with them  Out of bed to chair when able  Advance diet as tolerated  Follow-up final path results      Thank you for the consultation.  Will follow with you.    Jose Rafael Stoddard MD  [facility]  Office: [phone]

## 2024-02-08 NOTE — INTERVAL H&P NOTE
Pre-op Diagnosis: adenocarcinoma of right lung    The above referenced H&P was reviewed by Katie Nolan PA-C on 2/8/2024, the patient was examined and no significant changes have occurred in the patient's condition since the H&P was performed.  We discussed with the patient and/or legal representative the potential benefits, risks and side effects of this procedure; the likelihood of the patient achieving goals; and potential problems that might occur during recuperation.  We discussed reasonable alternatives to the procedure, including risks, benefits and side effects related to the alternatives and risks related to not receiving this procedure.  We will proceed with procedure as planned.    Patient's surgery was rescheduled from 1/29/24 to 2/8/24.

## 2024-02-08 NOTE — BRIEF OP NOTE
Pre-Operative Diagnosis: adenocarcinoma of right lung     Post-Operative Diagnosis: same      Procedure Performed:   FLEXIBLE BRONSCHOSCOPY, RIGHT VIDEO-ASSISTED THORACOSCOPIC UPPER LOBECTOMY, MEDIASTINAL LYMPH NODE DISSECTION    Surgeon(s) and Role:     * Matthew Fong Jr., MD - Primary    Assistant(s):  PA: Katie Nolan PA-C     Surgical Findings: as expected, no signs of pleural mets      Specimen:   ID Type Source Tests Collected by Time Destination   1 : Lymph Nodes Level 10 Tissue Lymph node SURGICAL PATHOLOGY TISSUE Matthew Fong Jr., MD 2/8/2024 0805    2 : Lymph Nodes Level 11 Tissue Lymph node SURGICAL PATHOLOGY TISSUE aMtthew Fong Jr., MD 2/8/2024 0810    3 : Right Upper Lobe Tissue Lung right SURGICAL PATHOLOGY TISSUE Matthew Fong Jr., MD 2/8/2024 0844    4 : Lymph Nodes Level 7 Tissue Lymph node SURGICAL PATHOLOGY TISSUE Matthew Fong Jr., MD 2/8/2024 0851    5 : Lymph Nodes Level 4 Tissue Lymph node SURGICAL PATHOLOGY TISSUE Matthew Fong Jr., MD 2/8/2024 0904    6 : Lymph Nodes Level 2 Tissue Lymph node SURGICAL PATHOLOGY TISSUE Matthew Fong Jr., MD 2/8/2024 0908        Estimated Blood Loss: Blood Output: 250 mL (2/8/2024  9:19 AM)      Disposition: ICU    Katie Nolan PA-C  2/8/2024  9:55 AM

## 2024-02-08 NOTE — CONSULTS
[facility]  Report of Consultation    Emy Gibson Patient Status:  Inpatient    1970 MRN [id number]   Location [facility] 6NE-A Attending Matthew Fong Jr., MD   Hosp Day # 0 PCP None Pcp     Reason for Consultation:  Medical management    REFERRING PHYSICIAN: Matthew Brenner Jr., MD    Chief Complaint: Status post right video-assisted thorascopic exploration and right upper lobectomy and mediastinal lymph node dissection by CT surgery Matthew Fong Jr., MD      History of Present Illness:  Emy Gibson is a  53 year old female admitted Status post right video-assisted thorascopic exploration and right upper lobectomy and mediastinal lymph node dissection by CT surgery Matthew Fong Jr., MD.  We are consulted for medical management, had transient hypotension for which was started on low-dose Levophed drip, blood pressure improved with this.  Patient complains of some paresthesias with some burning sensation on the right upper arm on the medial aspect, superior to the patient's right upper chest wall incision.  No other complaints.  Pain controlled with current pain medications.      History:  Past Medical History:   Diagnosis Date    Cancer (HCC)     H/O: hysterectomy     Shortness of breath      Past Surgical History:   Procedure Laterality Date    HYSTEROSCOPY      TONSILLECTOMY       Family History   Problem Relation Age of Onset    Hypertension Mother     Cancer Mother     Lung Disorder Mother     Cancer Paternal Grandfather      Social History:   reports that she has been smoking cigarettes. She has been smoking an average of 0.5 packs per day. She has never used smokeless tobacco. She reports that she does not currently use alcohol. She reports that she does not currently use drugs.    Allergies:  Allergies   Allergen Reactions    Singulair [Montelukast] SWELLING    Pollen OTHER (SEE COMMENTS)     Nasal congestion, runny nose       Medications:    Current  Facility-Administered Medications:     lactated ringers infusion, , Intravenous, Continuous    lactated ringers IV bolus 500 mL, 500 mL, Intravenous, Once PRN    atropine 0.1 MG/ML injection 0.5 mg, 0.5 mg, Intravenous, PRN    naloxone (Narcan) 0.4 MG/ML injection 0.08 mg, 0.08 mg, Intravenous, PRN    fentaNYL (Sublimaze) 50 mcg/mL injection 25 mcg, 25 mcg, Intravenous, Q5 Min PRN **OR** fentaNYL (Sublimaze) 50 mcg/mL injection 50 mcg, 50 mcg, Intravenous, Q5 Min PRN    HYDROmorphone (Dilaudid) 1 MG/ML injection 0.2 mg, 0.2 mg, Intravenous, Q5 Min PRN **OR** HYDROmorphone (Dilaudid) 1 MG/ML injection 0.4 mg, 0.4 mg, Intravenous, Q5 Min PRN **OR** HYDROmorphone (Dilaudid) 1 MG/ML injection 0.6 mg, 0.6 mg, Intravenous, Q5 Min PRN    ondansetron (Zofran) 4 MG/2ML injection 4 mg, 4 mg, Intravenous, Q6H PRN    prochlorperazine (Compazine) 10 MG/2ML injection 5 mg, 5 mg, Intravenous, Q8H PRN    midazolam (Versed) 2 MG/2ML injection 1 mg, 1 mg, Intravenous, Q5 Min PRN    diphenhydrAMINE (Benadryl) 50 mg/mL  injection 12.5 mg, 12.5 mg, Intravenous, PRN    meperidine (Demerol) 25 MG/ML injection 12.5 mg, 12.5 mg, Intravenous, PRN    nicotine (Nicoderm CQ) 21 MG/24HR patch 1 patch, 1 patch, Transdermal, Daily PRN    sodium chloride 0.9% infusion, , Intravenous, Continuous    heparin (Porcine) 5000 UNIT/ML injection 5,000 Units, 5,000 Units, Subcutaneous, Q8H EDILBERTO    acetaminophen (Ofirmev) 10 mg/mL infusion premix 1,000 mg, 1,000 mg, Intravenous, Q6H    oxyCODONE immediate release tab 5 mg, 5 mg, Oral, Q4H PRN **OR** oxyCODONE immediate release tab 10 mg, 10 mg, Oral, Q4H PRN    HYDROmorphone (Dilaudid) 1 MG/ML injection 0.4 mg, 0.4 mg, Intravenous, Q2H PRN **OR** HYDROmorphone (Dilaudid) 1 MG/ML injection 0.8 mg, 0.8 mg, Intravenous, Q2H PRN    polyethylene glycol (PEG 3350) (Miralax) 17 g oral packet 17 g, 17 g, Oral, Daily PRN    sennosides (Senokot) tab 17.2 mg, 17.2 mg, Oral, Nightly PRN    bisacodyl (Dulcolax) 10 MG  rectal suppository 10 mg, 10 mg, Rectal, Daily PRN    fleet enema (Fleet) 7-19 GM/118ML rectal enema 133 mL, 1 enema, Rectal, Once PRN    ondansetron (Zofran) 4 MG/2ML injection 4 mg, 4 mg, Intravenous, Q6H PRN    prochlorperazine (Compazine) 10 MG/2ML injection 5 mg, 5 mg, Intravenous, Q8H PRN    calcium carbonate (Tums) chewable tab 1,000 mg, 1,000 mg, Oral, TID PRN    ceFAZolin (Ancef) 2 g in 20mL IV syringe premix, 2 g, Intravenous, Q8H    ipratropium-albuterol (Duoneb) 0.5-2.5 (3) MG/3ML inhalation solution 3 mL, 3 mL, Nebulization, Q6H PRN    Review of Systems:  A comprehensive 14 point review of systems was completed.  Pertinent positives and negatives noted in the the HPI.    Physical Exam:  Vital signs: Blood pressure 114/57, pulse 71, temperature 97.8 °F (36.6 °C), resp. rate 17, height 5' 10\" (1.778 m), weight 194 lb (88 kg), SpO2 99%.  General: No acute distress. Alert and oriented x 3.  Respiratory: Decreased breath sounds at the right base, right-sided chest tube present.  Right upper chest wall incision in Dermabond  Cardiovascular: S1, S2.  Regular rate and rhythm.   Abdomen: Soft, nontender, nondistended.  Positive bowel sounds.  Neurologic: Awake alert, no focal neurological deficits.  Musculoskeletal: Full range of motion of all extremities.  No pedal edema or calf tenderness  Psychiatric: Appropriate mood and affect.    Laboratory Data:  Lab Results   Component Value Date    WBC 16.2 02/08/2024    HGB 12.5 02/08/2024    HCT 36.7 02/08/2024    .0 02/08/2024    PGLU 116 02/08/2024       ASSESSMENT / PLAN:   Right upper lobe lung cancer status post right video-assisted thorascopic exploration and right upper lobectomy and mediastinal lymph node dissection by CT surgery Hetal Marino, Matthew AQUINO MD-managed by CT surgeon    Leukocytosis  Follow CBC in a.m.  Mild hypotension  Status post transient Levophed drip, blood pressure improved at this time  Hemoglobin stable at 12.5  Follow blood  pressure      Quality:  DVT Prophylaxis: SCD, subcutaneous heparin  CODE status: full  Ramos: yes    Plan of care discussed with patient, patient's  and patient's sister-in-law at bedside.  Discussed with RN at bedside        Thank you for allowing me to participate in the care of your patient. Will continue to follow while in hospital.       Naila Cotton MD  2/8/2024  10:19 AM

## 2024-02-09 ENCOUNTER — APPOINTMENT (OUTPATIENT)
Dept: GENERAL RADIOLOGY | Facility: HOSPITAL | Age: 54
End: 2024-02-09
Attending: INTERNAL MEDICINE
Payer: COMMERCIAL

## 2024-02-09 PROBLEM — D64.9 ANEMIA: Status: ACTIVE | Noted: 2024-02-09

## 2024-02-09 LAB
ANION GAP SERPL CALC-SCNC: 5 MMOL/L (ref 0–18)
BASOPHILS # BLD AUTO: 0.03 X10(3) UL (ref 0–0.2)
BASOPHILS NFR BLD AUTO: 0.3 %
BUN BLD-MCNC: 9 MG/DL (ref 9–23)
CALCIUM BLD-MCNC: 8.2 MG/DL (ref 8.5–10.1)
CHLORIDE SERPL-SCNC: 110 MMOL/L (ref 98–112)
CO2 SERPL-SCNC: 24 MMOL/L (ref 21–32)
CREAT BLD-MCNC: 0.8 MG/DL
EGFRCR SERPLBLD CKD-EPI 2021: 88 ML/MIN/1.73M2 (ref 60–?)
EOSINOPHIL # BLD AUTO: 0.05 X10(3) UL (ref 0–0.7)
EOSINOPHIL NFR BLD AUTO: 0.5 %
ERYTHROCYTE [DISTWIDTH] IN BLOOD BY AUTOMATED COUNT: 12.9 %
GLUCOSE BLD-MCNC: 114 MG/DL (ref 70–99)
HCT VFR BLD AUTO: 32.7 %
HGB BLD-MCNC: 11.1 G/DL
IMM GRANULOCYTES # BLD AUTO: 0.03 X10(3) UL (ref 0–1)
IMM GRANULOCYTES NFR BLD: 0.3 %
LYMPHOCYTES # BLD AUTO: 2.29 X10(3) UL (ref 1–4)
LYMPHOCYTES NFR BLD AUTO: 24.3 %
MCH RBC QN AUTO: 32 PG (ref 26–34)
MCHC RBC AUTO-ENTMCNC: 33.9 G/DL (ref 31–37)
MCV RBC AUTO: 94.2 FL
MONOCYTES # BLD AUTO: 0.6 X10(3) UL (ref 0.1–1)
MONOCYTES NFR BLD AUTO: 6.4 %
NEUTROPHILS # BLD AUTO: 6.43 X10 (3) UL (ref 1.5–7.7)
NEUTROPHILS # BLD AUTO: 6.43 X10(3) UL (ref 1.5–7.7)
NEUTROPHILS NFR BLD AUTO: 68.2 %
OSMOLALITY SERPL CALC.SUM OF ELEC: 288 MOSM/KG (ref 275–295)
PLATELET # BLD AUTO: 242 10(3)UL (ref 150–450)
POTASSIUM SERPL-SCNC: 4.1 MMOL/L (ref 3.5–5.1)
RBC # BLD AUTO: 3.47 X10(6)UL
SODIUM SERPL-SCNC: 139 MMOL/L (ref 136–145)
WBC # BLD AUTO: 9.4 X10(3) UL (ref 4–11)

## 2024-02-09 PROCEDURE — 99232 SBSQ HOSP IP/OBS MODERATE 35: CPT | Performed by: INTERNAL MEDICINE

## 2024-02-09 PROCEDURE — 71045 X-RAY EXAM CHEST 1 VIEW: CPT | Performed by: INTERNAL MEDICINE

## 2024-02-09 RX ORDER — IPRATROPIUM BROMIDE AND ALBUTEROL SULFATE 2.5; .5 MG/3ML; MG/3ML
3 SOLUTION RESPIRATORY (INHALATION) ONCE
Status: COMPLETED | OUTPATIENT
Start: 2024-02-09 | End: 2024-02-09

## 2024-02-09 RX ORDER — OXYCODONE HCL 10 MG/1
10 TABLET, FILM COATED, EXTENDED RELEASE ORAL ONCE
Status: COMPLETED | OUTPATIENT
Start: 2024-02-09 | End: 2024-02-09

## 2024-02-09 RX ORDER — OXYCODONE HYDROCHLORIDE 5 MG/1
5 TABLET ORAL EVERY 4 HOURS PRN
Qty: 30 TABLET | Refills: 0 | Status: SHIPPED | OUTPATIENT
Start: 2024-02-09

## 2024-02-09 NOTE — DISCHARGE INSTRUCTIONS
Thoracic Surgery Post-Operative Appointment     Follow up appointment scheduled: with Dr. Fong on February 22nd at 12 noon located at the [facility].  Thank you.      Thoracic Surgery Discharge Instructions     Pain Management  You will be sent home with a prescription for pain medicine.  This will likely be a narcotic pain medicine such as Oxycodone or Norco (hydrocodone/acetaminophen).  If no contraindications, start with extra strength acetaminophen (Tylenol) or ibuprofen (Advil/Motrin) scheduled, and use narcotics for breakthrough pain. Ice packs can be helpful as well for surface level, skin incision pain.      - Take extra strength acetaminophen (Tylenol) 500 mg every 4 to 6 hours, as long as you have no known liver conditions.   - **Max dose for acetaminophen (Tylenol) is 4000 mg per day. If taking Norco, please note that each tab contains 325 mg of acetaminophen (Tylenol).  - Unless you have kidney problems, ibuprofen 600 mg every 6 hours can be used along with Tylenol for additional pain control.    Restrictions  Upon discharge home, do not get in an airplane or soak in a tub/pool until after your first follow up to see me in the office. You may shower, washing incisions gently with soap and water.    Other than that, no activity restrictions. You should attempt to be as active as possible. What ever you feel up to doing, you can do. Walking is strongly encouraged. You may drive (not within 4 hours of taking prescription pain medications).     No dietary restrictions. If taking prescription pain medications you may become constipated. Fluids, fiber and over the counter stool softeners are helpful for this.    Exercises  Do range of motion exercises twice a day with the arm on the side of your operation. The easiest is to \"walk\" your hand up the wall with your fingers. Eventually you should be able to get your arm straight up over your head.    You will be sent home with your breathing  \"toys\" -- like your incentive spirometer. Use this frequently at home. 10 times per hour while awake, if possible. If watching TV, use it at every commercial break.    Follow-up Appointment  Our office will reach out to you regarding a follow up appointment, if not already scheduled. Appointment will be approximately 1-2 weeks after discharge.     If you are experiencing any of these symptoms, please call our office at [phone]. Office hours are Monday through Friday, 8 am to 4:30 pm.  If you are calling the office after hours, please call the Thoracic Surgery On-Call number [phone], and state that you are a patient from [facility] or [facility].      - Persistent fevers of 101.5 or greater  - Worsening pain  - Worsening shortness of breath  - Signs of infection in your wound such as drainage, worsening of redness, swelling or     pain.  - Anything else that concerns you.

## 2024-02-09 NOTE — PROGRESS NOTES
"              After Visit Summary   2018    Coby Damon    MRN: [id number]           Patient Information     Date Of Birth          1941        Visit Information        Provider Department      2018 2:50 PM Imtiaz Alexandra MD [facility]        Today's Diagnoses     Need for vaccination    -  1    Acute pain of right shoulder        Radicular pain in right arm           Follow-ups after your visit        Who to contact     If you have questions or need follow up information about today's clinic visit or your schedule please contact [facility] directly at [phone].  Normal or non-critical lab and imaging results will be communicated to you by MyChart, letter or phone within 4 business days after the clinic has received the results. If you do not hear from us within 7 days, please contact the clinic through [employer] or phone. If you have a critical or abnormal lab result, we will notify you by phone as soon as possible.  Submit refill requests through [employer] or call your pharmacy and they will forward the refill request to us. Please allow 3 business days for your refill to be completed.          Additional Information About Your Visit        MyChart Information     [employer] lets you send messages to your doctor, view your test results, renew your prescriptions, schedule appointments and more. To sign up, go to [email or web address] . Click on \"Log in\" on the left side of the screen, which will take you to the Welcome page. Then click on \"Sign up Now\" on the right side of the page.     You will be asked to enter the access code listed below, as well as some personal information. Please follow the directions to create your username and password.     Your access code is: [email or web address]  Expires: 2018  7:12 PM     Your access code will  in 90 days. If you need help or a new code, please call your [facility] or [phone].        Care EveryWhere " [facility]     Hospitalist Progress Note     Emy Gibson Patient Status:  Inpatient    1970 MRN [id number]   Location [facility] 6NE-A Attending Hetal Marino, Matthew AQUINO MD   Hosp Day # 1 PCP None Pcp     Chief Complaint: Medical management    Subjective:     Patient has expected incisional pain moderate in intensity.  Yesterday had some paresthesias with some burning sensation on the right upper arm on the medial aspect, superior to the patient's right upper chest wall incision possibly from the effect of nerve block which is resolved at this time according to patient.  Had transient low blood pressure yesterday which resolved and blood pressure remained stable as discussed with RN at bedside.  Ramos removed by RN today.    Objective:    Review of Systems:   A comprehensive review of systems was completed; pertinent positive and negatives stated in subjective.    Vital signs:  Temp:  [98 °F (36.7 °C)-99 °F (37.2 °C)] 99 °F (37.2 °C)  Pulse:  [56-82] 60  Resp:  [11-23] 13  BP: ()/(28-64) 111/64  SpO2:  [95 %-100 %] 97 %  AO: ()/(41-76) 123/76    Physical Exam:    /64   Pulse 60   Temp 99 °F (37.2 °C)   Resp 13   Ht 5' 10\" (1.778 m)   Wt 202 lb 6.4 oz (91.8 kg)   SpO2 97%   BMI 29.04 kg/m²   General: No acute distress. Alert and oriented x 3.  Respiratory: Decreased breath sounds at the right base, right-sided chest tube present.  Right upper chest wall incision in Dermabond  Cardiovascular: S1, S2.  Regular rate and rhythm.   Abdomen: Soft, nontender, nondistended.  Positive bowel sounds.  Neurologic: Awake alert, no focal neurological deficits.  Musculoskeletal: Full range of motion of all extremities.  No pedal edema or calf tenderness  Psychiatric: Appropriate mood and affect.       Diagnostic Data:    Labs:  Recent Labs   Lab 24  1451 24  0500   WBC 16.2* 9.4   HGB 12.5 11.1*   MCV 97.9 94.2   .0 242.0       Recent Labs   Lab 24  0500   *    BUN 9   CREATSERUM 0.80   CA 8.2*      K 4.1      CO2 24.0       Estimated Creatinine Clearance: 87.9 mL/min (based on SCr of 0.8 mg/dL).    No results for input(s): \"TROP\", \"TROPHS\", \"CK\" in the last 168 hours.    No results for input(s): \"PTP\", \"INR\" in the last 168 hours.               Microbiology    No results found for this visit on 02/08/24.      Imaging: Reviewed in Epic.    Medications:    heparin  5,000 Units Subcutaneous Q8H EDILBERTO    acetaminophen  1,000 mg Intravenous Q6H    buPROPion SR  150 mg Oral BID       Assessment & Plan:    Right upper lobe lung cancer status post right video-assisted thorascopic exploration and right upper lobectomy and mediastinal lymph node dissection by CT surgery Hetal Marino, Matthew AQUINO MD-managed by CT surgeon     Leukocytosis  White count back to normal  Mild hypotension   blood pressure improved on 2/8/2024 and remained stable  Follow blood pressure  3. Mild expected blood loss anemia  Hemoglobin 11.1 today  Will follow CBC in a.m.  Discussed with RN at bedside.  Discussed with patient and patient's  at bedside.    Naila Cotton MD    Supplementary Documentation:     Quality:  DVT Mechanical Prophylaxis:   SCDs, Early ambuation  DVT Pharmacologic Prophylaxis   Medication    heparin (Porcine) 5000 UNIT/ML injection 5,000 Units                Code Status: Not on file  Ramos: No urinary catheter in place  Ramos Duration (in days):   Central line:    SABINE:     Discharge is dependent on: Clinical progress, per CT surgeon  At this point Ms. Gibson is expected to be discharge to: Home    The 21st Century Cures Act makes medical notes like these available to patients in the interest of transparency. Please be advised this is a medical document. Medical documents are intended to carry relevant information, facts as evident, and the clinical opinion of the practitioner. The medical note is intended as peer to peer communication and may appear blunt or direct. It is  ID     This is your Care EveryWhere ID. This could be used by other organizations to access your [facility] medical records  [id number]        Your Vitals Were     Pulse Temperature Respirations Height Pulse Oximetry BMI (Body Mass Index)    91 99.2  F (37.3  C) (Tympanic) 16 5' (1.524 m) 96% 22.12 kg/m2       Blood Pressure from Last 3 Encounters:   04/23/18 102/66   09/22/17 100/56   06/22/17 110/60    Weight from Last 3 Encounters:   04/23/18 113 lb 4 oz (51.4 kg)   09/22/17 111 lb 12.8 oz (50.7 kg)   06/22/17 113 lb (51.3 kg)              We Performed the Following     1st  Administration  [86521]     Pneumococcal vaccine 13 valent PCV13 IM (Prevnar) [83630]     SCREENING QUESTIONS FOR ADULT IMMUNIZATIONS        Primary Care Provider Office Phone # Fax #    Bandar Will [phone]       [address] [facility] [id number]        Equal Access to Services     [facility]: Hadii aad ku hadasho Soomaali, waaxda luqadaha, qaybta kaalmada adeegyada, marixa moctezuma hayantonieta abel . So [facility] [phone].    ATENCIÓN: Si habla español, tiene a nicholas disposición servicios gratuitos de asistencia lingüística. Llame al [phone].    We comply with applicable federal civil rights laws and [place] laws. We do not discriminate on the basis of race, color, national origin, age, disability, sex, sexual orientation, or gender identity.            Thank you!     Thank you for choosing [facility]  for your care. Our goal is always to provide you with excellent care. Hearing back from our patients is one way we can continue to improve our services. Please take a few minutes to complete the written survey that you may receive in the mail after your visit with us. Thank you!             Your Updated Medication List - Protect others around you: Learn how to safely use, store and throw away your medicines at www.disposemymeds.org.          This list is accurate as of 4/23/18   7:19 PM.  Always use your most recent med list.                   Brand Name Dispense Instructions for use Diagnosis    acetaminophen 325 MG tablet    TYLENOL     Take 325-650 mg by mouth every 6 hours as needed        gabapentin 300 MG capsule    NEURONTIN    180 capsule    TAKE ONE CAPSULE BY MOUTH TWICE DAILY **NEEDS  LABS  FOR  FURTHER  REFILLS**    Low back pain, unspecified back pain laterality, unspecified chronicity, with sciatica presence unspecified       lisinopril 10 MG tablet    PRINIVIL/ZESTRIL    90 tablet    Take 1 tablet (10 mg) by mouth daily    Hypertension goal BP (blood pressure) < 140/90       MULTIVITAMIN PO           omega 3 1000 MG Caps     90 capsule    Take 1 g by mouth daily        vitamin D 2000 units tablet      Take 2,000 Units by mouth daily           written in medical language and may contain abbreviations or verbiage that are unfamiliar.

## 2024-02-09 NOTE — PROGRESS NOTES
[facility] Pulmonary Progress Note    Emy Gibson Patient Status:  Inpatient    1970 MRN [id number]   Location [facility] 6NE-A Attending Hetal Marino, Matthew AQUINO MD   Hosp Day # 1 PCP None Pcp     Subjective:  Emy Gibson is a(n) 53 year old female who is admitted for elective lobectomy.    Overnight: No acute events overnight, having some pain around chest tube site also having difficulty with coughing    Objective:  /64   Pulse 60   Temp 99 °F (37.2 °C)   Resp 13   Ht 5' 10\" (1.778 m)   Wt 202 lb 6.4 oz (91.8 kg)   SpO2 97%   BMI 29.04 kg/m²       Temp (24hrs), Av.3 °F (36.8 °C), Min:98 °F (36.7 °C), Max:99 °F (37.2 °C)      Intake/Output:    Intake/Output Summary (Last 24 hours) at 2024 1543  Last data filed at 2024 1200  Gross per 24 hour   Intake 1376.6 ml   Output 1230 ml   Net 146.6 ml       Physical Exam:   General: alert, cooperative, oriented.  No respiratory distress.   Head: Normocephalic, without obvious abnormality, atraumatic.   Throat: Lips, mucosa, and tongue normal.  No thrush noted.   Neck: trachea midline, no adenopathy, no thyromegaly. No JVD.   Lungs: clear to auscultation bilaterally   Chest wall: No tenderness or deformity.   Heart: regular rate and rhythm, S1, S2 normal, no murmur, click, rub or gallop   Abdomen: soft, non-distended, no masses, no guarding, no     Rebound.   Extremity: No edema or cyanosis   Skin: No rashes or lesions.   Neurological: Alert, interactive, no focal deficits    Lab Data Review:  Recent Labs     24  1451 24  0500   WBC 16.2* 9.4   HGB 12.5 11.1*   .0 242.0     Recent Labs     24  0500      K 4.1      CO2 24.0   BUN 9   CREATSERUM 0.80     No results for input(s): \"PTP\", \"INR\", \"PTT\" in the last 168 hours.    Cultures:   No results found for this visit on 24.    Radiology:  XR CHEST AP PORTABLE  (CPT=71045)  Narrative: PROCEDURE:  XR CHEST AP PORTABLE  (CPT=71045)     TECHNIQUE:  AP  chest radiograph was obtained.     COMPARISON:  [facility] , XR, XR CHEST AP PORTABLE  (CPT=71045), 2/08/2024, 3:11 PM.     INDICATIONS:  chest tube eval     PATIENT STATED HISTORY: (As transcribed by Technologist)  Patient offered no additional history at this time.                           Impression: CONCLUSION:       Stable cardiomediastinal contour.  Suspected bibasilar reticular scarring or atelectasis.  No airspace consolidation.     Stable positioning of right-sided chest tube.  No significant pleural effusion or appreciable pneumothorax.     Minimal subcutaneous emphysema of the right lateral chest wall.        LOCATION:  [facility]                 Dictated by (CST): Luis Enrique Arndt MD on 2/09/2024 at 10:34 AM       Finalized by (CST): Luis Enrique Arndt MD on 2/09/2024 at 10:34 AM         Medications reviewed     Assessment and Plan:   Patient Active Problem List   Diagnosis    Personal history of tobacco use, presenting hazards to health    Nodule of right lung    Non-small cell cancer of right lung (HCC)    Hypotension    Leukocytosis    Anemia     Assessment:  Non-small cell lung adenocarcinoma with hilar lymphadenopathy status post right upper lobectomy with hilar lymph node dissection        Plan:  Post op care   Encouraged use of IS  Pain control to avoid splinting  Added one-time dose of oxycodone 10 mg  1 time DuoNeb to try to help with ease of airway clearance  Management of chest tube per thoracic surgery, remove when ok with them, likely to waterseal today  Out of bed to chair when able  Advance diet as tolerated  Follow-up final path results    Jose Rafael Stoddard MD  [facility]  Office: [phone]

## 2024-02-09 NOTE — PROGRESS NOTES
Thoracic Surgery Progress Note     Emy Gibson is a 53 year old female. MRN [id number]. Admitted 2024    SUBJECTIVE/24H EVENTS:     No acute events overnight. Blood pressure improved. Has some chest tube discomfort.       Objective:     VITALS:     Temp (24hrs), Av.3 °F (36.8 °C), Min:98 °F (36.7 °C), Max:99 °F (37.2 °C)   /64   Pulse 60   Temp 99 °F (37.2 °C)   Resp 13   Ht 177.8 cm (5' 10\")   Wt 202 lb 6.4 oz (91.8 kg)   SpO2 97%   BMI 29.04 kg/m²     EXAM:   General: well appearing female in no acute distress  Heart: regular rate and rhythm.   Lungs: Normal respiratory effort. Equal chest rise bilaterally  Incisions: c/d/i   Chest tube:   Air Leak: no  Output: serosanguineous   24 hour: 600 cc  Suction: yes  Abdomen: Soft, Non-tender, non-distended.  Extremities: No clubbing or cyanosis. No lateralizing weakness  Neuro: no focal defects  Psych:  oriented to person place and time, normal mood and affect      Intake/Output Summary (Last 24 hours) at 2024 1304  Last data filed at 2024 1200  Gross per 24 hour   Intake 1376.6 ml   Output 1500 ml   Net -123.4 ml         MEDS:   heparin  5,000 Units Subcutaneous Q8H EDILBERTO    acetaminophen  1,000 mg Intravenous Q6H    buPROPion SR  150 mg Oral BID       LABS:  Lab Results   Component Value Date    WBC 9.4 2024    HGB 11.1 2024    HCT 32.7 2024    .0 2024    CREATSERUM 0.80 2024    BUN 9 2024     2024    K 4.1 2024     2024    CO2 24.0 2024     2024    CA 8.2 2024         Assessment/Plan:     53 year old female 1 day post op from right VATS upper lobectomy.     -Chest tube to water seal.   -D/c fluids, d/c rodriguez, d/c art line.   -General diet.   -Transfer to floor.   -Pain control.   -Maintain saturations above 90%. Wean O2 as tolerated.  -Ambulate 3-4 times per day in the halls. Spend majority of day out of bed, in chair. Encouraged cough and IS  use 10x hourly.       Katie Nolan PA-C  Thoracic Surgery  Pager: [phone]

## 2024-02-09 NOTE — PLAN OF CARE
Assumed care of pt. At 1930. Pt. A & O x4. VSS. FOURNIER. C/o moderate pain, relieved w/ PRN medication. CT in place, intact w/  minimal to moderate output, see flowsheets. Pt. Encouraged to use IS overnight. Up to chair this AM w/o any hypotensive episodes. Will continue to monitor closely.

## 2024-02-09 NOTE — PLAN OF CARE
Pt received up in chair, aox4, vss, nsr on monitor. Chest tube to wall suction, small air leak noted. Monitoring outputs per order. Ramso removed. Pt seen by Dr Fong this AM; CT to water seal, CTU orders, and plan for CT out tmrw. Oxycontin for pain control, scheduled q12 ordered by hospitalist. Pt reports nausea this AM from pain; encouraged PO food and fluid intake. Pt able to void in bathroom.     Problem: RESPIRATORY - ADULT  Goal: Achieves optimal ventilation and oxygenation  Description: INTERVENTIONS:  - Assess for changes in respiratory status  - Assess for changes in mentation and behavior  - Position to facilitate oxygenation and minimize respiratory effort  - Oxygen supplementation based on oxygen saturation or ABGs  - Provide Smoking Cessation handout, if applicable  - Encourage broncho-pulmonary hygiene including cough, deep breathe, Incentive Spirometry  - Assess the need for suctioning and perform as needed  - Assess and instruct to report SOB or any respiratory difficulty  - Respiratory Therapy support as indicated  - Manage/alleviate anxiety  - Monitor for signs/symptoms of CO2 retention  Outcome: Progressing

## 2024-02-10 LAB
BLOOD TYPE BARCODE: 6200
UNIT VOLUME: 350 ML

## 2024-02-10 PROCEDURE — 99231 SBSQ HOSP IP/OBS SF/LOW 25: CPT | Performed by: INTERNAL MEDICINE

## 2024-02-10 PROCEDURE — 99232 SBSQ HOSP IP/OBS MODERATE 35: CPT | Performed by: INTERNAL MEDICINE

## 2024-02-10 NOTE — PROGRESS NOTES
[facility]     Hospitalist Progress Note     Emy Gibson Patient Status:  Inpatient    1970 MRN [id number]   Location [facility] 6NE-A Attending Hetal Marino, Matthew AQUINO MD   Hosp Day # 2 PCP None Pcp     Chief Complaint: Medical management    Subjective:     Patient has expected incisional pain improving, ambulated today.  Tolerating diet.  No nausea vomiting.    Objective:    Review of Systems:   A comprehensive review of systems was completed; pertinent positive and negatives stated in subjective.    Vital signs:  Temp:  [97.6 °F (36.4 °C)-99.4 °F (37.4 °C)] 98.1 °F (36.7 °C)  Pulse:  [64-75] 72  Resp:  [12-14] 13  BP: ()/(46-70) 110/50  SpO2:  [93 %-97 %] 97 %    Physical Exam:    /50 (BP Location: Left arm)   Pulse 72   Temp 98.1 °F (36.7 °C) (Oral)   Resp 13   Ht 5' 10\" (1.778 m)   Wt 202 lb 6.4 oz (91.8 kg)   SpO2 97%   BMI 29.04 kg/m²   General: No acute distress. Alert and oriented x 3.  Respiratory: Decreased breath sounds at the right base, right-sided chest tube present.  Right upper chest wall incision in Dermabond  Cardiovascular: S1, S2.  Regular rate and rhythm.   Abdomen: Soft, nontender, nondistended.  Positive bowel sounds.  Neurologic: Awake alert, no focal neurological deficits.  Musculoskeletal: Full range of motion of all extremities.  No pedal edema or calf tenderness  Psychiatric: Appropriate mood and affect.       Diagnostic Data:    Labs:  Recent Labs   Lab 24  1451 24  0500   WBC 16.2* 9.4   HGB 12.5 11.1*   MCV 97.9 94.2   .0 242.0       Recent Labs   Lab 24  0500   *   BUN 9   CREATSERUM 0.80   CA 8.2*      K 4.1      CO2 24.0       Estimated Creatinine Clearance: 87.9 mL/min (based on SCr of 0.8 mg/dL).    No results for input(s): \"TROP\", \"TROPHS\", \"CK\" in the last 168 hours.    No results for input(s): \"PTP\", \"INR\" in the last 168 hours.               Microbiology    No results found for this visit on  02/08/24.      Imaging: Reviewed in Epic.    Medications:    heparin  5,000 Units Subcutaneous Q8H EDILBERTO    buPROPion SR  150 mg Oral BID       Assessment & Plan:    Right upper lobe lung cancer status post right video-assisted thorascopic exploration and right upper lobectomy and mediastinal lymph node dissection by CT surgery Hetal Marino, Matthew AQUINO MD-managed by CT surgeon     Leukocytosis  White count back to normal  Mild hypotension   blood pressure improved on 2/8/2024 and remained stable  Follow blood pressure  3. Mild expected blood loss anemia  Hemoglobin 11.1 on 2/9/2024  Will follow CBC in a.m.  Discussed with RN at bedside.  Discussed with patient and patient's  at bedside.    Being transferred out of ICU to medical floor today    Naila Cotton MD    Supplementary Documentation:     Quality:  DVT Mechanical Prophylaxis:   SCDs, Early ambuation  DVT Pharmacologic Prophylaxis   Medication    heparin (Porcine) 5000 UNIT/ML injection 5,000 Units                Code Status: Not on file  Ramos: No urinary catheter in place  Ramos Duration (in days):   Central line:    SABINE: 2/11/2024    Discharge is dependent on: Clinical progress, per CT surgeon  At this point Ms. Gibson is expected to be discharge to: Home    The 21st Century Cures Act makes medical notes like these available to patients in the interest of transparency. Please be advised this is a medical document. Medical documents are intended to carry relevant information, facts as evident, and the clinical opinion of the practitioner. The medical note is intended as peer to peer communication and may appear blunt or direct. It is written in medical language and may contain abbreviations or verbiage that are unfamiliar.

## 2024-02-10 NOTE — PLAN OF CARE
Assumed care of pt from CCU around 1500. A/ox4, rm air, SR on tele. Reports slight pain at incision site, not due for pain medications at this time. Breath sounds clear/diminished upon auscultation. Reports productive cough w/ moderate amount of clear, yellow thick sputum. Achieving 1000 on IS. Chest tube to water seal. Slight air leak present w/ coughing.    Discussed POC w/ pt.    Problem: Patient/Family Goals  Goal: Patient/Family Long Term Goal  Description: Patient's Long Term Goal: to go home    Interventions:  - Ambulate  - Removed CT  - Pain management  - See additional Care Plan goals for specific interventions  Outcome: Progressing  Goal: Patient/Family Short Term Goal  Description: Patient's Short Term Goal: To control pain    Interventions:   - Pain meds prn  - repositioning, ambulating  - See additional Care Plan goals for specific interventions  Outcome: Progressing     Problem: RESPIRATORY - ADULT  Goal: Achieves optimal ventilation and oxygenation  Description: INTERVENTIONS:  - Assess for changes in respiratory status  - Assess for changes in mentation and behavior  - Position to facilitate oxygenation and minimize respiratory effort  - Oxygen supplementation based on oxygen saturation or ABGs  - Provide Smoking Cessation handout, if applicable  - Encourage broncho-pulmonary hygiene including cough, deep breathe, Incentive Spirometry  - Assess the need for suctioning and perform as needed  - Assess and instruct to report SOB or any respiratory difficulty  - Respiratory Therapy support as indicated  - Manage/alleviate anxiety  - Monitor for signs/symptoms of CO2 retention  Outcome: Progressing

## 2024-02-10 NOTE — PLAN OF CARE
Resumed care at 0730. Aox4, vitals stable. Chest tube to water seal, small serosanginous output. R armpit sutures open to air, chest tube site covered with dressing. Ambulated in hallways x2 today. Pain controlled with oral pain meds. Tolerating PO diet. Transferred to [facility] around 1500. Report given to Zoe VILLALBA. Transferred with all belongings, transported with patient for chest tube management.

## 2024-02-10 NOTE — PROGRESS NOTES
[facility] Pulmonary Progress Note    Emy Gibson Patient Status:  Inpatient    1970 MRN [id number]   Location [facility] 6NE-A Attending Hetal Marino, Matthew AQUINO MD   Hosp Day # 2 PCP None Pcp     Subjective:  Emy Gibson is a(n) 53 year old female who is admitted for elective lobectomy.    Overnight: No acute events overnight, still having some pain, which is controlled by oxycodone    Objective:  BP 98/51 (BP Location: Left arm)   Pulse 71   Temp 97.8 °F (36.6 °C)   Resp 14   Ht 5' 10\" (1.778 m)   Wt 202 lb 6.4 oz (91.8 kg)   SpO2 95%   BMI 29.04 kg/m²       Temp (24hrs), Av.4 °F (36.9 °C), Min:97.6 °F (36.4 °C), Max:99.4 °F (37.4 °C)      Intake/Output:    Intake/Output Summary (Last 24 hours) at 2/10/2024 1325  Last data filed at 2/10/2024 1200  Gross per 24 hour   Intake 540 ml   Output 250 ml   Net 290 ml       Physical Exam:   General: alert, cooperative, oriented.  No respiratory distress.   Head: Normocephalic, without obvious abnormality, atraumatic.   Throat: Lips, mucosa, and tongue normal.  No thrush noted.   Neck: trachea midline, no adenopathy, no thyromegaly. No JVD.   Lungs: clear to auscultation bilaterally   Chest wall: No tenderness or deformity.   Heart: regular rate and rhythm, S1, S2 normal, no murmur, click, rub or gallop   Abdomen: soft, non-distended, no masses, no guarding, no     Rebound.   Extremity: No edema or cyanosis   Skin: No rashes or lesions.   Neurological: Alert, interactive, no focal deficits    Lab Data Review:  Recent Labs     24  1451 24  0500   WBC 16.2* 9.4   HGB 12.5 11.1*   .0 242.0     Recent Labs     24  0500      K 4.1      CO2 24.0   BUN 9   CREATSERUM 0.80     No results for input(s): \"PTP\", \"INR\", \"PTT\" in the last 168 hours.    Cultures:   No results found for this visit on 24.    Radiology:  XR CHEST AP PORTABLE  (CPT=71045)  Narrative: PROCEDURE:  XR CHEST AP PORTABLE  (CPT=71045)     TECHNIQUE:  AP  chest radiograph was obtained.     COMPARISON:  [facility] , XR, XR CHEST AP PORTABLE  (CPT=71045), 2/08/2024, 3:11 PM.     INDICATIONS:  chest tube eval     PATIENT STATED HISTORY: (As transcribed by Technologist)  Patient offered no additional history at this time.                           Impression: CONCLUSION:       Stable cardiomediastinal contour.  Suspected bibasilar reticular scarring or atelectasis.  No airspace consolidation.     Stable positioning of right-sided chest tube.  No significant pleural effusion or appreciable pneumothorax.     Minimal subcutaneous emphysema of the right lateral chest wall.        LOCATION:  [facility]                 Dictated by (CST): Luis Enrique Arndt MD on 2/09/2024 at 10:34 AM       Finalized by (CST): Luis Enrique Arndt MD on 2/09/2024 at 10:34 AM         Medications reviewed     Assessment and Plan:   Patient Active Problem List   Diagnosis    Personal history of tobacco use, presenting hazards to health    Nodule of right lung    Non-small cell cancer of right lung (HCC)    Hypotension    Leukocytosis    Anemia     Assessment:  Non-small cell lung adenocarcinoma with hilar lymphadenopathy status post right upper lobectomy with hilar lymph node dissection        Plan:  Post op care   Continue to encourage use of IS  Pain control to avoid splinting  Management of chest tube per thoracic surgery, remove when ok with them, monitor on waterseal  Out of bed to chair when able, encouraged to ambulate today  Advance diet as tolerated  Would be okay for transfer out of ICU  Follow-up final path results    Jose Rafael Stoddard MD  [facility]  Office: [phone]

## 2024-02-10 NOTE — PROGRESS NOTES
THORACIC SURGERY POST-OPERATIVE PROGRESS NOTE    Subjective: AVBLAISE. Says she didn't walk but she sat instead. Her pain is controlled except when she moves.     Objective:    Vitals:    02/10/24 0800   BP: 98/51   Pulse: 71   Resp: 14   Temp: 97.6 °F (36.4 °C)       I/O last 3 completed shifts:  In: 1020 [I.V.:620; IV PIGGYBACK:400]  Out: 1340 [Urine:820; Chest Tube:520]    Chest tube: 1 chamber air leak on cough    Recent Labs   Lab 02/08/24  1451 02/09/24  0500   HGB 12.5 11.1*   WBC 16.2* 9.4   .0 242.0       Recent Labs   Lab 02/09/24  0500      K 4.1      CO2 24.0   BUN 9       Exam:  General: Awake, alert, NAD  Pulm: CTA B, no w/w/r  Card: RRR, no m/r/g  Abd: soft, NT, ND  Wounds: clean, dry, intact, No signs of infection    Assessment: 53 year old female, 2 Days Post-Op from VATS RULobectomy. Has an air leak. Output slowed down.    Plan:    Keep tube to WS  Ambulate 3-4 times per day in the halls  Spend majority of day out of bed, in chair  Encouraged cough and IS use  General Diet    Bentley Hendrickson MD, MS. FACS  Thoracic Surgery

## 2024-02-10 NOTE — PROGRESS NOTES
Assumed care of patient at 1930. Pt is resting in bed, chest rise and fall noted. Assessment per charting. Chest tube to water seal, small air leak noted. Pt reports pain related to R lateral chest surgical incision for which PRN pain medication was administered per order. Pt provided education on medication and pt verbalized understanding. Pt updated on POC and pt had no further questions. Pt resting in bed and call light within pt reach.

## 2024-02-11 PROBLEM — K59.00 CONSTIPATION: Status: ACTIVE | Noted: 2024-02-11

## 2024-02-11 PROCEDURE — 99232 SBSQ HOSP IP/OBS MODERATE 35: CPT | Performed by: INTERNAL MEDICINE

## 2024-02-11 RX ORDER — POLYETHYLENE GLYCOL 3350 17 G/17G
17 POWDER, FOR SOLUTION ORAL DAILY
Status: DISCONTINUED | OUTPATIENT
Start: 2024-02-11 | End: 2024-02-12

## 2024-02-11 RX ORDER — DOCUSATE SODIUM 100 MG/1
100 CAPSULE, LIQUID FILLED ORAL 2 TIMES DAILY
Status: DISCONTINUED | OUTPATIENT
Start: 2024-02-11 | End: 2024-02-12

## 2024-02-11 NOTE — PROGRESS NOTES
THORACIC SURGERY POST-OPERATIVE PROGRESS NOTE    Subjective: AVSS. RA. Pain overnight from the tube    Objective:    Vitals:    02/11/24 0420   BP: 94/48   Pulse: 92   Resp: 18   Temp: 97.6 °F (36.4 °C)       I/O last 3 completed shifts:  In: 1320 [P.O.:780; I.V.:340; IV PIGGYBACK:200]  Out: 230 [Chest Tube:230]    Chest tube: 1 chamber air leak with coughing    Recent Labs   Lab 02/08/24  1451 02/09/24  0500   HGB 12.5 11.1*   WBC 16.2* 9.4   .0 242.0       Recent Labs   Lab 02/09/24  0500      K 4.1      CO2 24.0   BUN 9       Exam:  General: Awake, alert, NAD  Pulm: CTA B, no w/w/r  Card: RRR, no m/r/g  Abd: soft, NT, ND  Wounds: clean, dry, intact, No signs of infection    Assessment: 53 year old female, 3 Days Post-Op from VATS lobectyom. Still has an air leak.    Plan:    Ambulate 3-4 times per day in the halls  Spend majority of day out of bed, in chair  Encouraged cough and IS use  Pain control; can increase oxycodone ot q3h and add IV tylenol back on as she says that helps her quite a bit  General Diet  Keep chest tubes to water seal    Bentley Hendrickson MD, MS, FACS  Thoracic Surgery

## 2024-02-11 NOTE — PROGRESS NOTES
[facility] Pulmonary Progress Note    Emy Gibson Patient Status:  Inpatient    1970 MRN [id number]   Location [facility] 6NE-A Attending Hetal Marino, Matthew AQUINO MD   Hosp Day # 3 PCP None Pcp     Subjective:  Emy Gibson is a(n) 53 year old female who is admitted for elective lobectomy.    Overnight: No acute events overnight, pain controlled    Objective:  BP 92/53 (BP Location: Right arm)   Pulse 83   Temp 98 °F (36.7 °C) (Oral)   Resp 18   Ht 5' 10\" (1.778 m)   Wt 202 lb 6.4 oz (91.8 kg)   SpO2 95%   BMI 29.04 kg/m²       Temp (24hrs), Av °F (36.7 °C), Min:97.6 °F (36.4 °C), Max:98.4 °F (36.9 °C)      Intake/Output:    Intake/Output Summary (Last 24 hours) at 2024 1426  Last data filed at 2024 0700  Gross per 24 hour   Intake 60 ml   Output 70 ml   Net -10 ml       Physical Exam:   General: alert, cooperative, oriented.  No respiratory distress.   Head: Normocephalic, without obvious abnormality, atraumatic.   Throat: Lips, mucosa, and tongue normal.  No thrush noted.   Neck: trachea midline, no adenopathy, no thyromegaly. No JVD.   Lungs: clear to auscultation bilaterally   Chest wall: No tenderness or deformity.   Heart: regular rate and rhythm, S1, S2 normal, no murmur, click, rub or gallop   Abdomen: soft, non-distended, no masses, no guarding, no     Rebound.   Extremity: No edema or cyanosis   Skin: No rashes or lesions.   Neurological: Alert, interactive, no focal deficits    Lab Data Review:  Recent Labs     24  1451 24  0500   WBC 16.2* 9.4   HGB 12.5 11.1*   .0 242.0     Recent Labs     24  0500      K 4.1      CO2 24.0   BUN 9   CREATSERUM 0.80     No results for input(s): \"PTP\", \"INR\", \"PTT\" in the last 168 hours.    Cultures:   No results found for this visit on 24.    Radiology:  XR CHEST AP PORTABLE  (CPT=71045)  Narrative: PROCEDURE:  XR CHEST AP PORTABLE  (CPT=71045)     TECHNIQUE:  AP chest radiograph was obtained.      COMPARISON:  [facility] , XR, XR CHEST AP PORTABLE  (CPT=71045), 2/08/2024, 3:11 PM.     INDICATIONS:  chest tube eval     PATIENT STATED HISTORY: (As transcribed by Technologist)  Patient offered no additional history at this time.                           Impression: CONCLUSION:       Stable cardiomediastinal contour.  Suspected bibasilar reticular scarring or atelectasis.  No airspace consolidation.     Stable positioning of right-sided chest tube.  No significant pleural effusion or appreciable pneumothorax.     Minimal subcutaneous emphysema of the right lateral chest wall.        LOCATION:  [facility]                 Dictated by (CST): Luis Enrique Arndt MD on 2/09/2024 at 10:34 AM       Finalized by (CST): Luis Enrique Arndt MD on 2/09/2024 at 10:34 AM         Medications reviewed     Assessment and Plan:   Patient Active Problem List   Diagnosis    Personal history of tobacco use, presenting hazards to health    Nodule of right lung    Non-small cell cancer of right lung (HCC)    Hypotension    Leukocytosis    Anemia    Constipation     Assessment:  Non-small cell lung adenocarcinoma with hilar lymphadenopathy status post right upper lobectomy with hilar lymph node dissection        Plan:  Post op care   Continue to encourage use of IS  Pain control to avoid splinting  Management of chest tube per thoracic surgery, remove when ok with them, monitor on waterseal, still with leak  Out of bed to chair when able, encouraged to ambulate  Follow-up final path results    Jose Rafael Stoddard MD  [facility]  Office: [phone]

## 2024-02-11 NOTE — PLAN OF CARE
A&Ox4  O2 sat WNL on RA  R CT to waterseal  Continent of bladder and bowel   Up with SBA   Pain controlled with PRN pain medication     Problem: Patient/Family Goals  Goal: Patient/Family Long Term Goal  Description: Patient's Long Term Goal: to go home    Interventions:  - Ambulate  - Removed CT  - Pain management  - See additional Care Plan goals for specific interventions  Outcome: Progressing  Goal: Patient/Family Short Term Goal  Description: Patient's Short Term Goal: To control pain    Interventions:   - Pain meds prn  - repositioning, ambulating  - See additional Care Plan goals for specific interventions  Outcome: Progressing     Problem: RESPIRATORY - ADULT  Goal: Achieves optimal ventilation and oxygenation  Description: INTERVENTIONS:  - Assess for changes in respiratory status  - Assess for changes in mentation and behavior  - Position to facilitate oxygenation and minimize respiratory effort  - Oxygen supplementation based on oxygen saturation or ABGs  - Provide Smoking Cessation handout, if applicable  - Encourage broncho-pulmonary hygiene including cough, deep breathe, Incentive Spirometry  - Assess the need for suctioning and perform as needed  - Assess and instruct to report SOB or any respiratory difficulty  - Respiratory Therapy support as indicated  - Manage/alleviate anxiety  - Monitor for signs/symptoms of CO2 retention  Outcome: Progressing

## 2024-02-11 NOTE — PROGRESS NOTES
[facility]     Hospitalist Progress Note     Emy Gibson Patient Status:  Inpatient    1970 MRN [id number]   Location [facility] 8NE-A Attending Hetal Marino, Matthew AQUINO MD   Hosp Day # 3 PCP None Pcp     Chief Complaint: elective R lung lobectomy    Subjective:     Patient is a 54 y/o female s/p elective right lung lobectomy d/t lung cancer. Patient states she had chest tube pain overnight and continues to have incisional pain rating it a 6. Patient states she is having tingling and numbness in the right arm starting at the armpit and extending down the anterior medial aspect. Patient denies shortness of breath, chest pain, abdomen pain, nausea, vomiting, diarrhea, pain or difficulty with urination. Patient states she has not had a bowel movement but is passing gas. Patient states she has been ambulating around the unit.    Objective:    Review of Systems:   A comprehensive review of systems was completed; pertinent positive and negatives stated in subjective.    Vital signs:  Temp:  [97.6 °F (36.4 °C)-98.4 °F (36.9 °C)] 98 °F (36.7 °C)  Pulse:  [64-92] 86  Resp:  [13-18] 18  BP: ()/(42-57) 93/42  SpO2:  [95 %-97 %] 95 %    Physical Exam:    BP 93/42 (BP Location: Left arm)   Pulse 86   Temp 98 °F (36.7 °C) (Oral)   Resp 18   Ht 5' 10\" (1.778 m)   Wt 202 lb 6.4 oz   SpO2 95%   BMI 29.04 kg/m²    General: No acute distress, afebrile, A/O x4  Respiratory: No wheezes, no rhonchi, no crackles. Chest tube present to right lateral chest wall, dressing clean dry and intact. Incision to left chest wall posterior to axilla closed with derma bond, clear of drainage or erythema.  Cardiovascular: S1, S2, regular rate and rhythm. No murmur, rub or gallop  Abdomen: Soft, Non-tender, non-distended, positive bowel sounds, no guarding  Neuro: No new focal deficits.   Extremities: No edema, no erythema, no calf tenderness    Diagnostic Data:    Labs:  Recent Labs   Lab 24  1451 24  0500    WBC 16.2* 9.4   HGB 12.5 11.1*   MCV 97.9 94.2   .0 242.0       Recent Labs   Lab 02/09/24  0500   *   BUN 9   CREATSERUM 0.80   CA 8.2*      K 4.1      CO2 24.0       Estimated Creatinine Clearance: 87.9 mL/min (based on SCr of 0.8 mg/dL).    No results for input(s): \"TROP\", \"TROPHS\", \"CK\" in the last 168 hours.    No results for input(s): \"PTP\", \"INR\" in the last 168 hours.               Microbiology    No results found for this visit on 02/08/24.      Imaging: Reviewed in Epic.    Medications:    heparin  5,000 Units Subcutaneous Q8H EDILBERTO    buPROPion SR  150 mg Oral BID       Assessment & Plan:      #Mild hypotension  Continue to follow BP  Consider IVF bolus    #Mild expected blood loss anemia  Awaiting CBC today  Hg 11.1 on 2/9/2024    #Right lung lobectomy  Continue pain medications per sx  CT surgery following      Margaret Awadalla PA-S    Supplementary Documentation:     Quality:  DVT Mechanical Prophylaxis:   SCDs, Early ambuation  DVT Pharmacologic Prophylaxis   Medication    heparin (Porcine) 5000 UNIT/ML injection 5,000 Units                Code Status: Not on file  Ramos: No urinary catheter in place  Ramos Duration (in days):   Central line:    SABINE: 2/11/2024    Discharge is dependent on: ***  At this point Ms. Gibson is expected to be discharge to: ***    The 21st Century Cures Act makes medical notes like these available to patients in the interest of transparency. Please be advised this is a medical document. Medical documents are intended to carry relevant information, facts as evident, and the clinical opinion of the practitioner. The medical note is intended as peer to peer communication and may appear blunt or direct. It is written in medical language and may contain abbreviations or verbiage that are unfamiliar.

## 2024-02-11 NOTE — PLAN OF CARE
R sided thoracotamy. Patient is aox3,  vss,ra, NSR.Left lung clear, diminished at the bases. R lung course, diminished at the bases. R chest tube dressing site intact. R near ampit incision is bothering patient but is dry, intact, dermabond.   Chest tube to waterseal. Expiratory air leak.  Toradol for pain control.   Problem: Patient/Family Goals  Goal: Patient/Family Long Term Goal  Description: Patient's Long Term Goal: to go home    Interventions:  - Ambulate  - Removed CT  - Pain management  - See additional Care Plan goals for specific interventions  Outcome: Progressing  Goal: Patient/Family Short Term Goal  Description: Patient's Short Term Goal: To control pain    Interventions:   - Pain meds prn  - repositioning, ambulating  - See additional Care Plan goals for specific interventions  Outcome: Progressing

## 2024-02-11 NOTE — PROGRESS NOTES
[facility]     Hospitalist Progress Note     Emy Gibson Patient Status:  Inpatient    1970 MRN [id number]   Location [facility] 6NE-A Attending Hetal Marino, Matthew AQUINO MD   Hosp Day # 3 PCP None Pcp     Chief Complaint: Medical management    Subjective:   Sitting up in the chair.  Seen with patient's  at bedside.  Has some incisional pain at the right chest tube site.  No bowel movements yet  Patient has expected incisional pain improving, ambulated today.  Tolerating diet.  No nausea vomiting.    Objective:    Review of Systems:   A comprehensive review of systems was completed; pertinent positive and negatives stated in subjective.    Vital signs:  Temp:  [97.6 °F (36.4 °C)-98.4 °F (36.9 °C)] 98 °F (36.7 °C)  Pulse:  [64-92] 85  Resp:  [13-18] 18  BP: ()/(42-57) 93/42  SpO2:  [95 %-97 %] 95 %    Physical Exam:    BP 93/42 (BP Location: Left arm)   Pulse 85   Temp 98 °F (36.7 °C) (Oral)   Resp 18   Ht 5' 10\" (1.778 m)   Wt 202 lb 6.4 oz (91.8 kg)   SpO2 95%   BMI 29.04 kg/m²   General: No acute distress. Alert and oriented x 3.  Respiratory: Decreased breath sounds at the right base, right-sided chest tube present.  Right upper chest wall incision in Dermabond  Cardiovascular: S1, S2.  Regular rate and rhythm.   Abdomen: Soft, nontender, nondistended.  Positive bowel sounds.  Neurologic: Awake alert, no focal neurological deficits.  Musculoskeletal: Full range of motion of all extremities.  No pedal edema or calf tenderness  Psychiatric: Appropriate mood and affect.       Diagnostic Data:    Labs:  Recent Labs   Lab 24  1451 24  0500   WBC 16.2* 9.4   HGB 12.5 11.1*   MCV 97.9 94.2   .0 242.0       Recent Labs   Lab 24  0500   *   BUN 9   CREATSERUM 0.80   CA 8.2*      K 4.1      CO2 24.0       Estimated Creatinine Clearance: 87.9 mL/min (based on SCr of 0.8 mg/dL).    No results for input(s): \"TROP\", \"TROPHS\", \"CK\" in the last 168  hours.    No results for input(s): \"PTP\", \"INR\" in the last 168 hours.               Microbiology    No results found for this visit on 02/08/24.      Imaging: Reviewed in Epic.    Medications:    heparin  5,000 Units Subcutaneous Q8H EDILBERTO    buPROPion SR  150 mg Oral BID       Assessment & Plan:    Right upper lobe lung cancer status post right video-assisted thorascopic exploration and right upper lobectomy and mediastinal lymph node dissection by CT surgery Hetal Marino, Matthew AQUINO MD-managed by CT surgeon     Leukocytosis  White count back to normal  Mild hypotension   blood pressure improved on 2/8/2024 and remained stable  Follow blood pressure  3. Mild expected blood loss anemia  Hemoglobin 11.1 on 2/9/2024  Will follow CBC in a.m.  4. Constipation  Will change MiraLAX from as needed to daily  Will add Colace  Discussed with RN at bedside.  Discussed with patient and patient's  at bedside.        Naila Cotton MD    Supplementary Documentation:     Quality:  DVT Mechanical Prophylaxis:   SCDs, Early ambuation  DVT Pharmacologic Prophylaxis   Medication    heparin (Porcine) 5000 UNIT/ML injection 5,000 Units                Code Status: Not on file  Ramos: No urinary catheter in place  Ramos Duration (in days):   Central line:    SABINE: 2/12/2024    Discharge is dependent on: Clinical progress, per CT surgeon  At this point Ms. Gibson is expected to be discharge to: Home    The 21st Century Cures Act makes medical notes like these available to patients in the interest of transparency. Please be advised this is a medical document. Medical documents are intended to carry relevant information, facts as evident, and the clinical opinion of the practitioner. The medical note is intended as peer to peer communication and may appear blunt or direct. It is written in medical language and may contain abbreviations or verbiage that are unfamiliar.

## 2024-02-12 VITALS
BODY MASS INDEX: 28.97 KG/M2 | TEMPERATURE: 98 F | WEIGHT: 202.38 LBS | HEIGHT: 70 IN | DIASTOLIC BLOOD PRESSURE: 57 MMHG | SYSTOLIC BLOOD PRESSURE: 101 MMHG | HEART RATE: 79 BPM | RESPIRATION RATE: 16 BRPM | OXYGEN SATURATION: 96 %

## 2024-02-12 PROCEDURE — 99232 SBSQ HOSP IP/OBS MODERATE 35: CPT | Performed by: INTERNAL MEDICINE

## 2024-02-12 PROCEDURE — 99233 SBSQ HOSP IP/OBS HIGH 50: CPT | Performed by: INTERNAL MEDICINE

## 2024-02-12 NOTE — PROGRESS NOTES
[facility]  Pulmonary/Critical Care/Sleep Medicine Progress note    Emy Gibson Patient Status:  Inpatient    1970 MRN [id number]   Location [facility] 8NE-A Attending Hetal Marino, Matthew AQUINO MD   Hosp Day # 4 PCP None Pcp        History of Present Illness:     Feels much better denies SOB or chest pain   History:  Past Medical History:   Diagnosis Date    Cancer (HCC)     H/O: hysterectomy     Shortness of breath      Past Surgical History:   Procedure Laterality Date    HYSTEROSCOPY      TONSILLECTOMY       Family History   Problem Relation Age of Onset    Hypertension Mother     Cancer Mother     Lung Disorder Mother     Cancer Paternal Grandfather       reports that she quit smoking about 3 weeks ago. Her smoking use included cigarettes. She smoked an average of 0.5 packs per day. She has never used smokeless tobacco. She reports that she does not currently use alcohol. She reports that she does not currently use drugs.    Allergies:  Allergies   Allergen Reactions    Singulair [Montelukast] SWELLING    Pollen OTHER (SEE COMMENTS)     Nasal congestion, runny nose       Medications:    Current Facility-Administered Medications:     docusate sodium (Colace) cap 100 mg, 100 mg, Oral, BID    polyethylene glycol (PEG 3350) (Miralax) 17 g oral packet 17 g, 17 g, Oral, Daily    meperidine (Demerol) 25 MG/ML injection 12.5 mg, 12.5 mg, Intravenous, PRN    nicotine (Nicoderm CQ) 21 MG/24HR patch 1 patch, 1 patch, Transdermal, Daily PRN    heparin (Porcine) 5000 UNIT/ML injection 5,000 Units, 5,000 Units, Subcutaneous, Q8H EDILBERTO    oxyCODONE immediate release tab 5 mg, 5 mg, Oral, Q4H PRN **OR** oxyCODONE immediate release tab 10 mg, 10 mg, Oral, Q4H PRN    HYDROmorphone (Dilaudid) 1 MG/ML injection 0.4 mg, 0.4 mg, Intravenous, Q2H PRN **OR** HYDROmorphone (Dilaudid) 1 MG/ML injection 0.8 mg, 0.8 mg, Intravenous, Q2H PRN    sennosides (Senokot) tab 17.2 mg, 17.2 mg, Oral, Nightly PRN    bisacodyl  (Dulcolax) 10 MG rectal suppository 10 mg, 10 mg, Rectal, Daily PRN    fleet enema (Fleet) 7-19 GM/118ML rectal enema 133 mL, 1 enema, Rectal, Once PRN    ondansetron (Zofran) 4 MG/2ML injection 4 mg, 4 mg, Intravenous, Q6H PRN    prochlorperazine (Compazine) 10 MG/2ML injection 5 mg, 5 mg, Intravenous, Q8H PRN    calcium carbonate (Tums) chewable tab 1,000 mg, 1,000 mg, Oral, TID PRN    ipratropium-albuterol (Duoneb) 0.5-2.5 (3) MG/3ML inhalation solution 3 mL, 3 mL, Nebulization, Q6H PRN    albuterol (Ventolin HFA) 108 (90 Base) MCG/ACT inhaler 2 puff, 2 puff, Inhalation, Q6H PRN    buPROPion SR (WELLBUTRIN SR) 12 hr tab 150 mg, 150 mg, Oral, BID    meclizine (Antivert) tab 25 mg, 25 mg, Oral, TID PRN    Intake/Output:    Intake/Output Summary (Last 24 hours) at 2/12/2024 1011  Last data filed at 2/12/2024 0422  Gross per 24 hour   Intake 720 ml   Output 300 ml   Net 420 ml          Review of Systems    Review of Systems: A comprehensive 10 point review of systems was completed.  Pertinent positives and negatives noted in the the HPI.        Patient Vitals for the past 24 hrs:   BP Temp Temp src Pulse Resp SpO2   02/12/24 0820 97/52 98.3 °F (36.8 °C) Oral 97 18 95 %   02/12/24 0415 91/50 98.2 °F (36.8 °C) Oral 74 18 96 %   02/11/24 2305 94/47 98.4 °F (36.9 °C) Oral 79 18 94 %   02/11/24 1930 95/45 98.3 °F (36.8 °C) Oral 86 16 96 %   02/11/24 1610 91/52 98.1 °F (36.7 °C) Oral 84 18 --   02/11/24 1500 -- -- -- 90 -- --   02/11/24 1154 92/53 98 °F (36.7 °C) Oral 83 18 --     Vitals:    02/11/24 1930 02/11/24 2305 02/12/24 0415 02/12/24 0820   BP: 95/45 94/47 91/50 97/52   BP Location: Right arm Left arm Left arm Left arm   Pulse: 86 79 74 97   Resp: 16 18 18 18   Temp: 98.3 °F (36.8 °C) 98.4 °F (36.9 °C) 98.2 °F (36.8 °C) 98.3 °F (36.8 °C)   TempSrc: Oral Oral Oral Oral   SpO2: 96% 94% 96% 95%   Weight:       Height:          Body mass index is 29.04 kg/m².     Physical Exam  Constitutional:       General: She is not  in acute distress.     Appearance: Normal appearance. She is not ill-appearing or diaphoretic.   HENT:      Head: Normocephalic and atraumatic.      Nose: Nose normal. No congestion or rhinorrhea.      Mouth/Throat:      Mouth: Mucous membranes are moist.      Pharynx: Oropharynx is clear. No oropharyngeal exudate or posterior oropharyngeal erythema.   Eyes:      Extraocular Movements: Extraocular movements intact.      Pupils: Pupils are equal, round, and reactive to light.   Cardiovascular:      Rate and Rhythm: Normal rate.      Pulses: Normal pulses.      Heart sounds: Normal heart sounds. No murmur heard.  Pulmonary:      Effort: Pulmonary effort is normal. No respiratory distress.      Breath sounds: Normal breath sounds. No wheezing or rhonchi.   Chest:      Chest wall: No tenderness.   Abdominal:      General: Abdomen is flat. Bowel sounds are normal.      Palpations: Abdomen is soft.   Musculoskeletal:         General: Normal range of motion.   Skin:     General: Skin is warm.   Neurological:      General: No focal deficit present.      Mental Status: She is alert and oriented to person, place, and time.   Psychiatric:         Mood and Affect: Mood normal.         Behavior: Behavior normal.         Thought Content: Thought content normal.         Judgment: Judgment normal.            Lab Data Review:    Recent Labs   Lab 02/08/24  1451 02/09/24  0500   WBC 16.2* 9.4   HGB 12.5 11.1*   HCT 36.7 32.7*   .0 242.0       Recent Labs   Lab 02/09/24  0500      K 4.1      CO2 24.0   BUN 9   CREATSERUM 0.80   CA 8.2*   *       No results for input(s): \"MG\" in the last 168 hours.    No results found for: \"PHOS\", \"PHOSPHORUS\"     No results for input(s): \"PT\", \"INR\", \"PTT\" in the last 168 hours.    No results for input(s): \"ABGPHT\", \"KBYZNF3K\", \"PGDDP0V\", \"ABGHCO3\", \"ABGBE\", \"TEMP\", \"NIDIA\", \"SITE\", \"DEV\", \"THGB\" in the last 168 hours.    No results for input(s): \"TROP\", \"CKMB\" in the last  168 hours.      Cultures:   No results found for this visit on 02/08/24.         Radiology personally reviewed:  No results found.     Patient Active Problem List   Diagnosis    Personal history of tobacco use, presenting hazards to health    Nodule of right lung    Non-small cell cancer of right lung (HCC)    Hypotension    Leukocytosis    Anemia    Constipation     Assessment:  Non-small cell lung adenocarcinoma with hilar lymphadenopathy status post right upper lobectomy with hilar lymph node dissection       Plan:  Await final path results  May discharge home from pulmonary standpoint     Thank You for allowing me to participate in this patient's care     Vivek Veliz MD

## 2024-02-12 NOTE — PLAN OF CARE
Patient AOX4. O2 sat > 90% on room air. NSR on tele. VSS. R sided chest tube to water seal, forced expiratory air leak observed, (+) tidaling in chamber. Chest tube insertion site CDI. Serosanguinous  output in CT unit. Patient with productive cough; sputum yellow with occasional small amounts of hemoptysis. Ambulating and voiding without issue. No BM since PTA, patient refused stool softener this evening. Education provided regarding risks of surgical and narcotic induced constipation. Patient verbalizes understanding. Sufficient pain control with PRN oxycodone. Cardiothoracic surgery following. Patient updated on plan of care.     Problem: Patient/Family Goals  Goal: Patient/Family Long Term Goal  Description: Patient's Long Term Goal: to go home    Interventions:  - Ambulate  - Removed CT  - Pain management  - See additional Care Plan goals for specific interventions  Outcome: Progressing  Goal: Patient/Family Short Term Goal  Description: Patient's Short Term Goal: To control pain    Interventions:   - Pain meds prn  - repositioning, ambulating  - See additional Care Plan goals for specific interventions  Outcome: Progressing     Problem: RESPIRATORY - ADULT  Goal: Achieves optimal ventilation and oxygenation  Description: INTERVENTIONS:  - Assess for changes in respiratory status  - Assess for changes in mentation and behavior  - Position to facilitate oxygenation and minimize respiratory effort  - Oxygen supplementation based on oxygen saturation or ABGs  - Provide Smoking Cessation handout, if applicable  - Encourage broncho-pulmonary hygiene including cough, deep breathe, Incentive Spirometry  - Assess the need for suctioning and perform as needed  - Assess and instruct to report SOB or any respiratory difficulty  - Respiratory Therapy support as indicated  - Manage/alleviate anxiety  - Monitor for signs/symptoms of CO2 retention  Outcome: Progressing

## 2024-02-12 NOTE — PLAN OF CARE
Patient is aox3, vss,ra, Left lung clear, Right lung rhonchi, IS 1250. NSR. Chest tube pulled by Dr Fong at around 1030 am. CT site stitched.  Armpit incision dermabonded, intact.   OK to DC per Dr Fong. FU at [facility].   Family member at bedside.  Problem: Patient/Family Goals  Goal: Patient/Family Long Term Goal  Description: Patient's Long Term Goal: to go home    Interventions:  - Ambulate  - Removed CT  - Pain management  - See additional Care Plan goals for specific interventions  Outcome: Completed  Goal: Patient/Family Short Term Goal  Description: Patient's Short Term Goal: To control pain    Interventions:   - Pain meds prn  - repositioning, ambulating  - See additional Care Plan goals for specific interventions  Outcome: Completed

## 2024-02-12 NOTE — DISCHARGE SUMMARY
[facility]    Discharge Summary    Emy Gibson Patient Status:  Inpatient    1970 MRN [id number]   Location [facility] 8NE-A Attending Hetal aMrino, Matthew AQUINO MD   Hosp Day # 4 PCP None Pcp     Date of Admission: 2024 Disposition: Home or Self Care     Date of Discharge: 2024    Admitting Diagnosis: adenocarcinoma of right lung  Adenocarcinoma of right lung (HCC)    Discharge Diagnosis:   Patient Active Problem List   Diagnosis    Personal history of tobacco use, presenting hazards to health    Nodule of right lung    Non-small cell cancer of right lung (HCC)    Hypotension    Leukocytosis    Anemia    Constipation       Reason for Admission: adenocarcinoma of right lung    Physical Exam:  General: well appearing female in no acute distress  HEENT: Normocephalic, PERRL, EOMI, no scleral icterus  Heart: regular rate and rhythm. No lower extremity edema.  Lungs: Normal respiratory effort. Equal chest rise bilaterally.   Chest: wounds clean, dry and intact.  No erythema, swelling, drainage or other signs of infection  Abdomen: Soft, Non-tender, non-distended. No hepatosplenomegaly noted.  Extremities: No clubbing or cyanosis. No lateralizing weakness  Neuro: No gross cranial nerve defects, no loss of sensation  Psych: oriented to person place and time, normal mood and affect     History of Present Illness: Patient is a 53 year old female admitted for surgery for lung cancer on 2024    Hospital Course: Patient underwent surgery on 2024. Final pathology is pending. She did well post-operatively. After an initial stay in the ICU, she went to the general floor. There she was able to get up and move around on their own and tolerate a general diet. Once the air leak had stopped and drainage was at acceptable levels, the chest tubes were pulled and the patient was sent home in good condition.    Final Pathology: pending    Consultations: Pulmonology, Internal Medicine    Procedures:  FLEXIBLE BRONSCHOSCOPY, RIGHT VIDEO-ASSISTED THORACOSCOPIC UPPER LOBECTOMY, MEDIASTINAL LYMPH NODE DISSECTION    Complications: none    Discharge Condition: Good    Discharge Medications:      Discharge Medications        START taking these medications        Instructions Prescription details   oxyCODONE 5 MG Tabs      Take 1 tablet (5 mg total) by mouth every 4 (four) hours as needed for Pain.   Quantity: 30 tablet  Refills: 0            CONTINUE taking these medications        Instructions Prescription details   albuterol 108 (90 Base) MCG/ACT Aers  Commonly known as: Ventolin HFA      Inhale 2 puffs into the lungs every 6 (six) hours as needed for Wheezing.   Quantity: 8.5 g  Refills: 5     buPROPion HCl ER (Smoking Det) 150 MG Tb12  Commonly known as: ZYBAN      Take 1 tablet (150 mg total) by mouth 2 (two) times daily.   Stop taking on: February 15, 2024  Quantity: 60 tablet  Refills: 0     meclizine 25 MG Tabs  Commonly known as: Antivert      Take 1 tablet (25 mg total) by mouth 3 (three) times daily as needed.   Quantity: 21 tablet  Refills: 0     nicotine 21 MG/24HR Pt24  Commonly known as: Nicoderm CQ      Apply 1 patch (21mg) daily for 2 weeks, then apply 1 patch (14mg) daily for 2 weeks, then apply 1 patch (7mg) daily for 2 weeks   Quantity: 14 patch  Refills: 0               Where to Get Your Medications        These medications were sent to [id number] - [place] [address] AT [facility], [phone], [phone]  [address], [facility] [address]      Phone: [phone]   oxyCODONE 5 MG Tabs         Follow up Visits: Follow-up with Dr. Fong in 1-2 weeks.     Other Discharge Instructions: see separate d/c instructions    Time Spent: 35 minutes    Katie Nolan PA-C  2/12/2024

## 2024-02-12 NOTE — PROGRESS NOTES
[facility] [place]     Hospitalist Progress Note     Emy Gibson Patient Status:  Inpatient    1970 MRN [id number]   Location [facility] 6NE-A Attending Hetal Marino, Matthew AQUINO MD   Hosp Day # 4 PCP None Pcp     Chief Complaint: Medical management    Subjective:   Sitting up in the chair.  Seen with patient's  at bedside.  Has some incisional pain 2/10. Off chest tube .  No bowel movements yet  Patient has expected incisional pain improving, ambulated today.  Tolerating diet.  No nausea vomiting.    Objective:    Review of Systems:   A comprehensive review of systems was completed; pertinent positive and negatives stated in subjective.    Vital signs:  Temp:  [98.1 °F (36.7 °C)-98.4 °F (36.9 °C)] 98.3 °F (36.8 °C)  Pulse:  [74-97] 97  Resp:  [16-18] 18  BP: (91-97)/(45-52) 97/52  SpO2:  [94 %-96 %] 95 %    Physical Exam:    BP 97/52 (BP Location: Left arm)   Pulse 97   Temp 98.3 °F (36.8 °C) (Oral)   Resp 18   Ht 5' 10\" (1.778 m)   Wt 202 lb 6.4 oz (91.8 kg)   SpO2 95%   BMI 29.04 kg/m²   General: No acute distress. Alert and oriented x 3.  Respiratory: Decreased breath sounds at the right base, right-sided chest tube present.  Right upper chest wall incision in Dermabond  Cardiovascular: S1, S2.  Regular rate and rhythm.   Abdomen: Soft, nontender, nondistended.  Positive bowel sounds.  Neurologic: Awake alert, no focal neurological deficits.  Musculoskeletal: Full range of motion of all extremities.  No pedal edema or calf tenderness  Psychiatric: Appropriate mood and affect.       Diagnostic Data:    Labs:  Recent Labs   Lab 24  1451 24  0500   WBC 16.2* 9.4   HGB 12.5 11.1*   MCV 97.9 94.2   .0 242.0       Recent Labs   Lab 24  0500   *   BUN 9   CREATSERUM 0.80   CA 8.2*      K 4.1      CO2 24.0       Estimated Creatinine Clearance: 87.9 mL/min (based on SCr of 0.8 mg/dL).    No results for input(s): \"TROP\", \"TROPHS\", \"CK\" in the last 168  hours.    No results for input(s): \"PTP\", \"INR\" in the last 168 hours.               Microbiology    No results found for this visit on 02/08/24.      Imaging: Reviewed in Epic.    Medications:    docusate sodium  100 mg Oral BID    polyethylene glycol (PEG 3350)  17 g Oral Daily    heparin  5,000 Units Subcutaneous Q8H EDILBERTO    buPROPion SR  150 mg Oral BID       Assessment & Plan:    Right upper lobe lung cancer status post right video-assisted thorascopic exploration and right upper lobectomy and mediastinal lymph node dissection by CT surgery Hetal Marino, Matthew AQUINO MD-managed by CT surgeon     Leukocytosis  White count back to normal  Mild hypotension   blood pressure improved on 2/8/2024 and remained stable  asymptomatic  3. Mild expected blood loss anemia  Hemoglobin 11.1 on 2/9/2024  4. Constipation   MiraLAX from as needed to daily   Colace  Discussed with RN at bedside.  Discussed with patient and patient's  at bedside.    Being discharged today, by surgeon, wound care and activity per surgeon, f/u with surgeon as directed, reg OP PCP or TCC clinic in 1 week    Naila Cotton MD    Supplementary Documentation:     Quality:  DVT Mechanical Prophylaxis:   SCDs, Early ambuation  DVT Pharmacologic Prophylaxis   Medication    heparin (Porcine) 5000 UNIT/ML injection 5,000 Units                Code Status: Not on file  Ramos: No urinary catheter in place  Ramos Duration (in days):   Central line:    SABINE: 2/12/2024    Discharge is dependent on: Clinical progress, per CT surgeon  At this point Ms. Gibson is expected to be discharge to: Home    The 21st Century Cures Act makes medical notes like these available to patients in the interest of transparency. Please be advised this is a medical document. Medical documents are intended to carry relevant information, facts as evident, and the clinical opinion of the practitioner. The medical note is intended as peer to peer communication and may appear blunt or  direct. It is written in medical language and may contain abbreviations or verbiage that are unfamiliar.

## 2024-02-12 NOTE — PROGRESS NOTES
Thoracic Surgery Progress Note     Emy Gibson is a 53 year old female. MRN [id number]. Admitted 2024    SUBJECTIVE/24H EVENTS:     No acute events overnight. Has chest tube discomfort. No other concerns.       Objective:     VITALS:     Temp (24hrs), Av.2 °F (36.8 °C), Min:98 °F (36.7 °C), Max:98.4 °F (36.9 °C)   BP 97/52 (BP Location: Left arm)   Pulse 97   Temp 98.3 °F (36.8 °C) (Oral)   Resp 18   Ht 177.8 cm (5' 10\")   Wt 202 lb 6.4 oz (91.8 kg)   SpO2 95%   BMI 29.04 kg/m²     EXAM:   General: well appearing female in no acute distress  Heart: regular rate and rhythm.   Lungs: Normal respiratory effort. Equal chest rise bilaterally  Incisions: c/d/i   Chest tube:   Air Leak: no  Output: serosanguineous   24 hour: 350 cc  Suction: no  Abdomen: Soft, Non-tender, non-distended.  Extremities: No clubbing or cyanosis. No lateralizing weakness  Neuro: no focal defects  Psych:  oriented to person place and time, normal mood and affect      Intake/Output Summary (Last 24 hours) at 2024 0952  Last data filed at 2024 0422  Gross per 24 hour   Intake 720 ml   Output 300 ml   Net 420 ml         MEDS:   docusate sodium  100 mg Oral BID    polyethylene glycol (PEG 3350)  17 g Oral Daily    heparin  5,000 Units Subcutaneous Q8H EDILBERTO    buPROPion SR  150 mg Oral BID           Assessment/Plan:     53 year old female 4 days post op from right VATS upper lobectomy.     -Chest tube removed  -General diet.   -Pain control.   -Maintain saturations above 90%. Wean O2 as tolerated.  -Ambulate 3-4 times per day in the halls. Spend majority of day out of bed, in chair. Encouraged cough and IS use 10x hourly.     -Okay to discharge from thoracic surgery standpoint once cleared with other services. Follow up in 1-2 weeks.     Katie Nolan PA-C  Thoracic Surgery  Pager: [phone]

## 2024-02-13 ENCOUNTER — PATIENT OUTREACH (OUTPATIENT)
Dept: CASE MANAGEMENT | Age: 54
End: 2024-02-13

## 2024-02-13 DIAGNOSIS — Z02.9 ENCOUNTERS FOR ADMINISTRATIVE PURPOSE: Primary | ICD-10-CM

## 2024-02-13 DIAGNOSIS — R91.1 NODULE OF RIGHT LUNG: ICD-10-CM

## 2024-02-13 PROCEDURE — 1111F DSCHRG MED/CURRENT MED MERGE: CPT

## 2024-02-14 ENCOUNTER — TELEPHONE (OUTPATIENT)
Dept: HEMATOLOGY/ONCOLOGY | Facility: HOSPITAL | Age: 54
End: 2024-02-14

## 2024-02-14 ENCOUNTER — TELEPHONE (OUTPATIENT)
Dept: INTERNAL MEDICINE CLINIC | Facility: CLINIC | Age: 54
End: 2024-02-14

## 2024-02-14 NOTE — PAYOR COMM NOTE
--------------  ADMISSION REVIEW     Payor: ALLIED BENEFIT  Subscriber #:  [id number]  Authorization Number: [id number]    Admit date: 2/8/24  Admit time:  5:49 AM       Report of Consultation     Reason for Consultation:  Medical management    Chief Complaint: Status post right video-assisted thorascopic exploration and right upper lobectomy and mediastinal lymph node dissection by CT surgery Matthew Fong Jr., MD      History of Present Illness:  Emy Gibson is a  53 year old female admitted Status post right video-assisted thorascopic exploration and right upper lobectomy and mediastinal lymph node dissection by CT surgery Matthew Fong Jr., MD.  We are consulted for medical management, had transient hypotension for which was started on low-dose Levophed drip, blood pressure improved with this.  Patient complains of some paresthesias with some burning sensation on the right upper arm on the medial aspect, superior to the patient's right upper chest wall incision.  No other complaints.  Pain controlled with current pain medications.     Vital signs: Blood pressure 114/57, pulse 71, temperature 97.8 °F (36.6 °C), resp. rate 17, height 5' 10\" (1.778 m), weight 194 lb (88 kg), SpO2 99%.  General: No acute distress. Alert and oriented x 3.  Respiratory: Decreased breath sounds at the right base, right-sided chest tube present.  Right upper chest wall incision in Dermabond  Cardiovascular: S1, S2.  Regular rate and rhythm.   Abdomen: Soft, nontender, nondistended.  Positive bowel sounds.  Neurologic: Awake alert, no focal neurological deficits.  Musculoskeletal: Full range of motion of all extremities.  No pedal edema or calf tenderness  Psychiatric: Appropriate mood and affect.     Laboratory Data:        Lab Results   Component Value Date     WBC 16.2 02/08/2024     HGB 12.5 02/08/2024     HCT 36.7 02/08/2024     .0 02/08/2024     PGLU 116 02/08/2024      ASSESSMENT / PLAN:   Right upper lobe lung cancer  status post right video-assisted thorascopic exploration and right upper lobectomy and mediastinal lymph node dissection by CT surgery Hetal Marino, Matthew AQUINO MD-managed by CT surgeon     Leukocytosis  Follow CBC in a.m.  Mild hypotension  Status post transient Levophed drip, blood pressure improved at this time  Hemoglobin stable at 12.5  Follow blood pressure      Quality:  DVT Prophylaxis: SCD, subcutaneous heparin      OPERATION DATE:  02/08/2024     OPERATIVE REPORT  PREOPERATIVE DIAGNOSIS:  Right upper lobe lung cancer.  POSTOPERATIVE DIAGNOSIS:  Right upper lobe lung cancer.  PROCEDURE:    1.       Flexible bronchoscopy.  2.       Right video-assisted thoracoscopic exploration.  3.       Right upper lobectomy.  4.       Mediastinal lymph node dissection.  5.       Multilevel intercostal nerve block.     SPECIMENS:  Right upper lobe; level 2, 4, 7, 10, and 11 lymph nodes.     DRAINS:  28-Finnish chest tube x1.        2/9/24  Subjective:   Patient has expected incisional pain moderate in intensity.  Yesterday had some paresthesias with some burning sensation on the right upper arm on the medial aspect, superior to the patient's right upper chest wall incision possibly from the effect of nerve block which is resolved at this time according to patient.  Had transient low blood pressure yesterday which resolved and blood pressure remained stable as discussed with RN at bedside.  Ramos removed by RN today.     Temp:  [98 °F (36.7 °C)-99 °F (37.2 °C)] 99 °F (37.2 °C)  Pulse:  [56-82] 60  Resp:  [11-23] 13  BP: ()/(28-64) 111/64  SpO2:  [95 %-100 %] 97 %  AO: ()/(41-76) 123/76  Lab 02/08/24  1451 02/09/24  0500   WBC 16.2* 9.4   HGB 12.5 11.1*   MCV 97.9 94.2   .0 242.0      Lab 02/09/24  0500   *   BUN 9   CREATSERUM 0.80   CA 8.2*      K 4.1      CO2 24.0   Assessment & Plan:    Right upper lobe lung cancer status post right video-assisted thorascopic exploration and right upper  lobectomy and mediastinal lymph node dissection by CT surgery Matthew Fong Jr., MD-managed by CT surgeon     Leukocytosis  White count back to normal  Mild hypotension   blood pressure improved on 2/8/2024 and remained stable  Follow blood pressure  3. Mild expected blood loss anemia  Hemoglobin 11.1 today  Will follow CBC in a.m.      CARDIOTHORACIC SURGERY  Assessment/Plan:      53 year old female 1 day post op from right VATS upper lobectomy.      -Chest tube to water seal.   -D/c fluids, d/c rodriguez, d/c art line.   -General diet.   -Pain control.   -Maintain saturations above 90%. Wean O2 as tolerated.  -Ambulate 3-4 times per day in the halls. Spend majority of day out of bed, in chair. Encouraged cough and IS use 10x hourly.            2/10/24  Patient has expected incisional pain improving, ambulated today.  Tolerating diet.  No nausea vomiting.     Temp:  [97.6 °F (36.4 °C)-99.4 °F (37.4 °C)] 98.1 °F (36.7 °C)  Pulse:  [64-75] 72  Resp:  [12-14] 13  BP: ()/(46-70) 110/50  SpO2:  [93 %-97 %] 97 %    Respiratory: Decreased breath sounds at the right base, right-sided chest tube present.  Right upper chest wall incision in Dermabond  Cardiovascular: S1, S2.  Regular rate and rhythm.   Abdomen: Soft, nontender, nondistended.  Positive bowel sounds.  Neurologic: Awake alert, no focal neurological deficits.  Musculoskeletal: Full range of motion of all extremities.  No pedal edema or calf tenderness  Psychiatric: Appropriate mood and affect.  Lab 02/08/24  1451 02/09/24  0500   WBC 16.2* 9.4   HGB 12.5 11.1*   MCV 97.9 94.2   .0 242.0   Assessment & Plan:    Right upper lobe lung cancer status post right video-assisted thorascopic exploration and right upper lobectomy and mediastinal lymph node dissection by CT surgery Matthew Fong Jr., MD-managed by CT surgeon     Leukocytosis  White count back to normal  Mild hypotension   blood pressure improved on 2/8/2024 and remained stable  Follow  blood pressure  3. Mild expected blood loss anemia  Hemoglobin 11.1 on 2/9/2024  Will follow CBC in a.m.  Being transferred out of ICU to medical floor today         2/11/24  Has some incisional pain at the right chest tube site.  No bowel movements yet  Patient has expected incisional pain improving, ambulated today.  Tolerating diet.  No nausea vomiting.   Respiratory: Decreased breath sounds at the right base, right-sided chest tube present.  Right upper chest wall incision in Dermabond     Temp:  [97.6 °F (36.4 °C)-98.4 °F (36.9 °C)] 98 °F (36.7 °C)  Pulse:  [64-92] 85  Resp:  [13-18] 18  BP: ()/(42-57) 93/42  SpO2:  [95 %-97 %] 95 %    Assessment & Plan:    Right upper lobe lung cancer status post right video-assisted thorascopic exploration and right upper lobectomy and mediastinal lymph node dissection by CT surgery Hetal Marino, Matthew AQUINO MD-managed by CT surgeon     Leukocytosis  White count back to normal  Mild hypotension   blood pressure improved on 2/8/2024 and remained stable  Follow blood pressure  3. Mild expected blood loss anemia  Hemoglobin 11.1 on 2/9/2024  Will follow CBC in a.m.  4. Constipation  Will change MiraLAX from as needed to daily  Will add Colace      CARDIOTHORACIC SURGERY  Assessment: 53 year old female, 3 Days Post-Op from VATS lobectyom. Still has an air leak.     Plan:  Ambulate 3-4 times per day in the halls  Spend majority of day out of bed, in chair  Encouraged cough and IS use  Pain control; can increase oxycodone ot q3h and add IV tylenol back on as she says that helps her quite a bit  General Diet  Keep chest tubes to water seal      PULMONOLOGY   Assessment:  Non-small cell lung adenocarcinoma with hilar lymphadenopathy status post right upper lobectomy with hilar lymph node dissection        Plan:  Post op care   Continue to encourage use of IS  Pain control to avoid splinting  Management of chest tube per thoracic surgery, remove when ok with them, monitor on  waterseal, still with leak  Out of bed to chair when able, encouraged to ambulate      DATE OF DISCHARGE: 24    Discharge Summary           Emy Gibson Patient Status:  Inpatient    1970 MRN [id number]   [facility] 8NE-A Attending Hetal Marino, Matthew AQUINO MD   Hosp Day # 4 PCP None Pcp      Date of Admission: 2024     Disposition: Home or Self Care      Date of Discharge: 2024     Admitting Diagnosis: adenocarcinoma of right lung  Adenocarcinoma of right lung (HCC)      Reason for Admission: adenocarcinoma of right lung     Physical Exam:  General: well appearing female in no acute distress  HEENT: Normocephalic, PERRL, EOMI, no scleral icterus  Heart: regular rate and rhythm. No lower extremity edema.  Lungs: Normal respiratory effort. Equal chest rise bilaterally.   Chest: wounds clean, dry and intact.  No erythema, swelling, drainage or other signs of infection  Abdomen: Soft, Non-tender, non-distended. No hepatosplenomegaly noted.  Extremities: No clubbing or cyanosis. No lateralizing weakness  Neuro: No gross cranial nerve defects, no loss of sensation  Psych: oriented to person place and time, normal mood and affect      History of Present Illness: Patient is a 53 year old female admitted for surgery for lung cancer on 2024     Hospital Course: Patient underwent surgery on 2024. Final pathology is pending. She did well post-operatively. After an initial stay in the ICU, she went to the general floor. There she was able to get up and move around on their own and tolerate a general diet. Once the air leak had stopped and drainage was at acceptable levels, the chest tubes were pulled and the patient was sent home in good condition.     Final Pathology: pending  Procedures: FLEXIBLE BRONSCHOSCOPY, RIGHT VIDEO-ASSISTED THORACOSCOPIC UPPER LOBECTOMY, MEDIASTINAL LYMPH NODE DISSECTION   Discharge Condition: Good       Vitals (last day) before discharge       Date/Time Temp  Pulse Resp BP SpO2 Weight O2 Device O2 Flow Rate (L/min) Who    02/12/24 1208 97.7 °F (36.5 °C) 79 16 101/57 96 % -- None (Room air) -- NC    02/12/24 0820 98.3 °F (36.8 °C) 97 18 97/52 95 % -- None (Room air) -- AW    02/12/24 0415 98.2 °F (36.8 °C) 74 18 91/50 96 % -- None (Room air) -- SM    02/11/24 2305 98.4 °F (36.9 °C) 79 18 94/47 94 % -- None (Room air) 0 L/min MM    02/11/24 1930 98.3 °F (36.8 °C) 86 16 95/45 96 % -- None (Room air) 0 L/min MM    02/11/24 1610 98.1 °F (36.7 °C) 84 18 91/52 -- -- -- -- MB    02/11/24 0746 98 °F (36.7 °C) -- 18 93/42 95 % -- None (Room air) -- AW    02/11/24 0420 97.6 °F (36.4 °C) 92 18 94/48 97 % -- None (Room air) -- AB

## 2024-02-14 NOTE — TELEPHONE ENCOUNTER
Spoke to patient for TCM today.  The patient is scheduled as a new patient to establish care with Dr. Junior on 02/19/2024. This appointment is within the recommended TCM time frame.  TCM appointment recommended by 02/26/2024 as patient is a Moderate risk for readmission.  Please advise.    BOOK BY DATE (last date for TCM): 02/26/2024    Clinical staff:  Please consult with  regarding this upcoming appointment and change to a TCM/HFU if approved. Thank you!       Future Appointments   Date Time Provider Department Center   2/15/2024  1:15 PM Igor Mccurdy MD [facility] HEM ONC EMO   2/19/2024  9:00 AM [facility]   2/22/2024 12:00 PM NUNO THOR ELM [facility] HEM ONC EMO

## 2024-02-14 NOTE — PROGRESS NOTES
Initial Post Discharge Follow Up   Discharge Date: 2/12/24  Contact Date: 2/13/2024    Consent Verification:  Assessment Completed With: Patient  HIPAA Verified?  Yes    Discharge Dx:     Nodule of right lung   Non-small cell cancer of right lung (HCC)   Hypotension   Leukocytosis   Anemia   Constipation       General:   How have you been since your discharge from the hospital?   The patient reports feeling good. Patient reports some incisional pain- a 1/10 and feels it is controlled with the Oxycodone. The patient redness, swelling, discharge, warmth.The patient denies chest pain, SOB, fever, chills, n, v, diarrhea. No concern for constipation. She reports ambulating well with no complaints. Tolerating her meals. Using her Incentive Spirometer. She reports no complaints at this time.     [facility] confirmed with patient she does not have a PCP however is requesting assistance with establishing with one. She states it has been years since seeing a PCP. [place] closer [place]. [facility] scheduled a new patient appointment to establish care with Dr. Junior- appt scheduled and contact information provided.     Dr. Junior   [address]  [address]  [place] [address]  [phone]    Do you have any pain since discharge?  Yes  Where: Around my incision   Rating on pain scale 1-10, 10 being the worst pain you have ever experienced, what is current pain: 1  Alleviating factors: Taking Oxycodone as prescribed   Aggravating factors: n/a   Is the pain manageable at home? Yes  How well was your pain managed while in the hospital?   On a scale of 1-5   1- Very Poor and 5- Very well   Very Well  When you were leaving the hospital were your discharge instructions reviewed with you? Yes  How well were your discharge instructions explained to you?   On a scale of 1-5   1- Very Poor and 5- Very well   Very Well  Do you have any questions about your discharge instructions?  No  Before leaving the hospital  was your diagnoses explained to you? Yes  Do you have any questions about your diagnoses? No  Are you able to perform normal daily activities of living as you have prior to your hospital stay (dressing, bathing, ambulating to the bathroom, etc)? yes  (NCM) Was patient given a different diet per AVS? no      Medications:   Current Outpatient Medications   Medication Sig Dispense Refill    oxyCODONE 5 MG Oral Tab Take 1 tablet (5 mg total) by mouth every 4 (four) hours as needed for Pain. 30 tablet 0    albuterol 108 (90 Base) MCG/ACT Inhalation Aero Soln Inhale 2 puffs into the lungs every 6 (six) hours as needed for Wheezing. 8.5 g 5    buPROPion HCl ER, Smoking Det, 150 MG Oral Tablet 12 Hr Take 1 tablet (150 mg total) by mouth 2 (two) times daily. 60 tablet 0    nicotine 21 MG/24HR Transdermal Patch 24 Hr Apply 1 patch (21mg) daily for 2 weeks, then apply 1 patch (14mg) daily for 2 weeks, then apply 1 patch (7mg) daily for 2 weeks 14 patch 0    meclizine 25 MG Oral Tab Take 1 tablet (25 mg total) by mouth 3 (three) times daily as needed. 21 tablet 0     Were there any changes to your current medication(s) noted on the AVS? Yes  NCM reviewed the following changes with the patient:   START taking:  oxyCODONE    If so, were these medication changes discussed with you prior to leaving the hospital? Yes  If a new medication was prescribed:    Was the new medication's purpose & side effects reviewed? Yes  Do you have any questions about your new medication? No  Did you  your discharge medications when you left the hospital? Yes  Let's go over your medications together to make sure we are not missing anything. Medications Reviewed  Are there any reasons that keep you from taking your medication as prescribed? No  Are you having any concerns with constipation? No  Did patient receive their flu shot (Sept-March)? No    Discharge medications reviewed/discussed/and reconciled against outpatient medications with  patient.  Any changes or updates to medications sent to PCP.  Patient Acknowledged     Referrals/orders at D/C:  Referrals/orders placed at D/C? no    DME ordered at D/C? Yes  What? Incentive Spirometer   Have you received your (DME)? yes    Discharge orders, AVS reviewed and discussed with patient. Any changes or updates to orders sent to PCP.  Patient Acknowledged        Diagnosis specifics:   Follow up appointment scheduled: with Dr. Fong on February 22nd at 12 noon located at the [facility]. Thank you.  Pain Management  You will be sent home with a prescription for pain medicine. This will  likely be a narcotic pain medicine such as Oxycodone or Norco  (hydrocodone/acetaminophen). If no contraindications, start with extra  strength acetaminophen (Tylenol) or ibuprofen (Advil/Motrin) scheduled,  and use narcotics for breakthrough pain. Ice packs can be helpful as well  for surface level, skin incision pain.  - Take extra strength acetaminophen (Tylenol) 500 mg every 4 to  6 hours, as long as you have no known liver conditions.  - **Max dose for acetaminophen (Tylenol) is 4000 mg per  day. If taking Norco, please note that each tab contains 325 mg of  acetaminophen (Tylenol).  - Unless you have kidney problems, ibuprofen 600 mg every 6  hours can be used along with Tylenol for additional pain control.  Restrictions  Upon discharge home, do not get in an airplane or soak in a tub/pool  until after your first follow up to see me in the office. You may shower,  washing incisions gently with soap and water.  Other than that, no activity restrictions. You should attempt to be as  active as possible. What ever you feel up to doing, you can do. Walking    is strongly encouraged. You may drive (not within 4 hours of taking prescription pain medications).  No dietary restrictions. If taking prescription pain medications you may become constipated. Fluids, fiber and over the  counter stool softeners are  helpful for this.  Exercises  Do range of motion exercises twice a day with the arm on the side of your operation. The easiest is to \"walk\" your hand  up the wall with your fingers. Eventually you should be able to get your arm straight up over your head.  You will be sent home with your breathing \"toys\" -- like your incentive spirometer. Use this frequently at home. 10 times  per hour while awake, if possible. If watching TV, use it at every commercial break.  Follow-up Appointment  Our office will reach out to you regarding a follow up appointment, if not already scheduled. Appointment will be  approximately 1-2 weeks after discharge.  If you are experiencing any of these symptoms, please call our office at [phone]. Office hours are Monday  through Friday, 8 am to 4:30 pm. If you are calling the office after hours, please call the Thoracic Surgery On-Call  number [phone], and state that you are a patient from [facility] or [facility].  - Persistent fevers of 101.5 or greater  - Worsening pain  - Worsening shortness of breath  - Signs of infection in your wound such as drainage, worsening of redness, swelling or  pain.  - Anything else that concerns you.    Follow up appointments:      Your appointments       Date & Time Appointment Department (Center)    Feb 15, 2024  1:15 PM CST HEMATOLOGY ONCOLOGY FOLLOW UP with Igor Mccurdy MD [facility] Hematology Oncology ([facility])        Feb 22, 2024 12:00 PM CST HEMATOLOGY ONCOLOGY FOLLOW UP with NUNO HERRERA [facility] Hematology Oncology ([facility])              [facility] Hematology Oncology  [facility]  [address] [facility] [address]  [phone]            TCC  Was TCC ordered: Yes    Patient declined to schedule at the TCC as she feels this is too many appointments for her     PCP (If no TCC appointment)  Does patient already have a PCP appointment scheduled? No  NCM  Attempted to schedule PCP office TCM appointment with patient   If no appointment scheduled: Explain    NCM scheduled a new patient appointment to establish care with Dr. Krystal DE LA ROSA has been sent to clinical staff to change to a TCM if appropriate.     Specialist    Does the patient have any other follow up appointment(s) needing to be scheduled? Yes  If yes: NCM reviewed upcoming specialist appointment with patient: Yes- NCM confirmed with patient she has the contact information and will call to schedule     Schedule an appointment with Katheryn Oviedo as soon as  possible for a visit in 2 week(s)  Specialty: PULMONARY DISEASES, Internal Medicine, Critical Care  [address]  [address]  [facility] [id number] [phone]    Does the patient need assistance scheduling appointment(s): No    Is there any reason as to why you cannot make your appointment(s)?  No     Needs post D/C:   Now that you are home, are there any needs or concerns you need addressed before your next visit with your PCP?  (DME, meds, questions, etc.): No    Interventions by NCM:   NCM reviewed Thoracic Surgery Post op discharge instructions with the patient. NCM provided education on s/s of infection and blood clots. NCM encouraged patient to walk as tolerated. NCM advised patient to use the Incentive Spirometer 10 times per hour while awake, if possible. New patient appt scheduled with Dr. Krystal DE LA ROSA to clinical staff send to change to a TCM if appropriate. All d/c instructions reviewed with pt.  Reviewed when to call MD vs when to go to ER/call 911.  Educated pt on the importance of taking all meds as prescribed as well as close f/u with PCP/specialists.  Pt verbalized understanding and will contact office with any further questions or concerns.       Overall Rating:   How would you rate the care you received while in the hospital? excellent    CCM referral placed:    Not Applicable

## 2024-02-14 NOTE — TELEPHONE ENCOUNTER
Shaina Lee and Vanesa, yes please change pt to TCM/ establish care.   Path Notes (To The Dermatopathologist): Please check margins.

## 2024-02-15 ENCOUNTER — APPOINTMENT (OUTPATIENT)
Dept: HEMATOLOGY/ONCOLOGY | Facility: HOSPITAL | Age: 54
End: 2024-02-15
Attending: THORACIC SURGERY (CARDIOTHORACIC VASCULAR SURGERY)
Payer: COMMERCIAL

## 2024-02-15 VITALS
WEIGHT: 194 LBS | RESPIRATION RATE: 16 BRPM | OXYGEN SATURATION: 99 % | BODY MASS INDEX: 27.77 KG/M2 | HEART RATE: 71 BPM | DIASTOLIC BLOOD PRESSURE: 41 MMHG | TEMPERATURE: 98 F | HEIGHT: 70 IN | SYSTOLIC BLOOD PRESSURE: 102 MMHG

## 2024-02-15 DIAGNOSIS — C34.91 ADENOCARCINOMA OF RIGHT LUNG (HCC): Primary | ICD-10-CM

## 2024-02-15 DIAGNOSIS — Z51.11 CHEMOTHERAPY MANAGEMENT, ENCOUNTER FOR: ICD-10-CM

## 2024-02-15 DIAGNOSIS — C34.91 MALIGNANT NEOPLASM OF RIGHT LUNG, UNSPECIFIED PART OF LUNG (HCC): ICD-10-CM

## 2024-02-15 PROCEDURE — 99215 OFFICE O/P EST HI 40 MIN: CPT | Performed by: INTERNAL MEDICINE

## 2024-02-15 RX ORDER — SODIUM CHLORIDE 9 MG/ML
1000 INJECTION, SOLUTION INTRAVENOUS ONCE
OUTPATIENT
Start: 2024-02-22

## 2024-02-15 RX ORDER — CYANOCOBALAMIN 1000 UG/ML
1000 INJECTION, SOLUTION INTRAMUSCULAR; SUBCUTANEOUS ONCE
Status: CANCELLED | OUTPATIENT
Start: 2024-02-21

## 2024-02-15 NOTE — PROGRESS NOTES
Oncology note    Lung Cancer     Requesting: Dr. 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 MLND pT2N2 24        Returns for f.u           Past Medical History:   Diagnosis Date    Cancer (HCC)     H/O: hysterectomy     Shortness of breath      Social History     Socioeconomic History    Marital status:    Tobacco Use    Smoking status: Former     Packs/day: .5     Types: Cigarettes     Quit date: 2024     Years since quittin.0    Smokeless tobacco: Never   Vaping Use    Vaping Use: Never used   Substance and Sexual Activity    Alcohol use: Not Currently    Drug use: Not Currently     Lung cancer in father    Current Outpatient Medications on File Prior to Visit   Medication Sig Dispense Refill    oxyCODONE 5 MG Oral Tab Take 1 tablet (5 mg total) by mouth every 4 (four) hours as needed for Pain. 30 tablet 0    buPROPion HCl ER, Smoking Det, 150 MG Oral Tablet 12 Hr Take 1 tablet (150 mg total) by mouth 2 (two) times daily. 60 tablet 0    meclizine 25 MG Oral Tab Take 1 tablet (25 mg total) by mouth 3 (three) times daily as needed. 21 tablet 0    albuterol 108 (90 Base) MCG/ACT Inhalation Aero Soln Inhale 2 puffs into the lungs every 6 (six) hours as needed for Wheezing. (Patient not taking: Reported on 2/15/2024) 8.5 g 5    nicotine 21 MG/24HR Transdermal Patch 24 Hr Apply 1 patch (21mg) daily for 2 weeks, then apply 1 patch (14mg) daily for 2 weeks, then apply 1 patch (7mg) daily for 2 weeks (Patient not taking: Reported on 2/15/2024) 14 patch 0     Current Facility-Administered Medications on File Prior to Visit   Medication Dose Route Frequency Provider Last Rate Last Admin    [COMPLETED] ipratropium-albuterol (Duoneb) 0.5-2.5 (3) MG/3ML inhalation solution 3 mL  3 mL Nebulization Once Jose Rafael Stoddard MD   3 mL at 24 1045    [COMPLETED] oxyCODONE ER (OxyCONTIN ER) 12 hr tab 10 mg  10 mg Oral Once Jose Rafael Stoddard MD    10 mg at 24 1029    [] lactated ringers IV bolus 500 mL  500 mL Intravenous Once PRN Dane Stiles MD        [] atropine 0.1 MG/ML injection 0.5 mg  0.5 mg Intravenous PRN Dane Stiles MD        [] naloxone (Narcan) 0.4 MG/ML injection 0.08 mg  0.08 mg Intravenous PRN Dane Stiles MD        [] fentaNYL (Sublimaze) 50 mcg/mL injection 25 mcg  25 mcg Intravenous Q5 Min PRN Dane Stiles MD        Or    [] fentaNYL (Sublimaze) 50 mcg/mL injection 50 mcg  50 mcg Intravenous Q5 Min PRN Dane Stiles MD        [] HYDROmorphone (Dilaudid) 1 MG/ML injection 0.2 mg  0.2 mg Intravenous Q5 Min PRN Dane Stiles MD   0.2 mg at 24 1507    Or    [] HYDROmorphone (Dilaudid) 1 MG/ML injection 0.4 mg  0.4 mg Intravenous Q5 Min PRN Dane Stiles MD        Or    [] HYDROmorphone (Dilaudid) 1 MG/ML injection 0.6 mg  0.6 mg Intravenous Q5 Min PRN Dane Stiles MD        [] midazolam (Versed) 2 MG/2ML injection 1 mg  1 mg Intravenous Q5 Min PRN Dane Stiles MD        [] diphenhydrAMINE (Benadryl) 50 mg/mL  injection 12.5 mg  12.5 mg Intravenous PRN Dane Stiles MD        [COMPLETED] acetaminophen (Ofirmev) 10 mg/mL infusion premix 1,000 mg  1,000 mg Intravenous Q6H Katie Nolan PA-C 400 mL/hr at 02/10/24 0956 1,000 mg at 02/10/24 0956    [COMPLETED] ceFAZolin (Ancef) 2 g in 20mL IV syringe premix  2 g Intravenous Q8H Katie Nolan PA-C   2 g at 24 0013    [COMPLETED] norepinephrine (Levophed) 4 mg/250mL infusion premix        Stopped at 24 1500     Allergies   Allergen Reactions    Singulair [Montelukast] SWELLING    Pollen OTHER (SEE COMMENTS)     Nasal congestion, runny nose     Vitals:    02/15/24 1319   BP: 102/41   Pulse: 71   Resp: 16   Temp: 98 °F (36.7 °C)     Nad, rr, nd, no edema, lozoya, no deformity, nl mood, nl skin          A/P:  53 year old  With right upper lobe adenocarcinoma s/p  right upper  lobectomy MLND pT2N2 2/8/24  --EGFR, ALK, PDL1  --Plan for cisplatin pemetrexed potentially followed by pembrolizumab pending biomarkers

## 2024-02-16 ENCOUNTER — TELEPHONE (OUTPATIENT)
Dept: HEMATOLOGY/ONCOLOGY | Facility: HOSPITAL | Age: 54
End: 2024-02-16

## 2024-02-16 NOTE — TELEPHONE ENCOUNTER
Called patient to schedule appointment - first attempt.  Left message to call back.     Progress Notes by Elieser Sauceda PA at 04/16/18 08:16 AM     Author:  Elieser Sauceda PA Service:  (none) Author Type:  Physician Assistant     Filed:  04/16/18 12:25 PM Encounter Date:  4/16/2018 Status:  Signed     :  Elieser Sauceda PA (Physician Assistant)            CC:[RS1.1T]   Chief Complaint     Patient presents with     • Pain      lower back pain x 1week[RS1.2T]        Visit:[RS1.1T] Initial[RS1.1M] for this complaint[RS1.1T]  PCP:  Dr. Yessenia Quintana[RS1.1M]    SUBJECTIVE  Acosta Mendez is a 51 year old male who presents today[RS1.1T] with complaint of low back pain.  Patient started about 4 months ago.  It was around Christmas of 2017 when he slipped on ice outside and fell onto his back.  He had pretty significant pain for a week or so but then it did improve.  He took a international flight to [place] in January and had a lot of pain on the way out there but was fine on the way home.  On April 5, 2011 days ago, he was carrying a heavy box and twisted felt a sudden surge of pain in the lower back.  He has had a lot of pain that has continued since.  Pain is made worse with flexion and is relieved by either sitting or standing or changing positions frequently.  He has been taking 2 Advil intermittently as needed and Tiger balm rub without much benefit.  Denies any prior low back issues.  Denies numbness and tingling.  He denies saddle paresthesia, extremity weakness, loss of bowel or bladder control.[RS1.3M]    Past Medical History:     Diagnosis  Date   • Erectile dysfunction    • High cholesterol 3/7/2014   • Vitamin B12 deficiency    • Vitamin D deficiency      Past Surgical History:      Procedure  Laterality Date   • DRY NO SIGNIFICANT SURGICAL HISTORY NOTED        Family History       Problem   Relation Age of Onset   • Heart  Mother 72     stent     • Heart  Father 72     bypass     • GI  Daughter      crohns     • * Healthy  Brother      Social History     Substance Use  Topics     • Smoking status: Never Smoker   • Smokeless tobacco: Never Used   • Alcohol use No[RS1.2T]       Allergies:  Review of patient's allergies indicates no known allergies.       Current Outpatient Prescriptions     Medication  Sig   • Vitamin D, Cholecalciferol, 1000 UNITS CAPS Take  by mouth.   • sildenafil (VIAGRA) 50 MG tablet Take 1 Tab by mouth as needed for Erectile Dysfunction.       Review of Systems:[RS1.1T]  Fever:     No elevation of temperature  General: denies fever, night sweats, weight changes, appetite changes and sleep problems  Heent: Pt denies problems with head, eyes, ears, nose, mouth, throat and neck  Respiratory: No coughing, wheezing, changes in voice,  nor shortness of breath  Cardiovascular:No chest pain, palpitations or other cardiac complaints noted  Gastrointestinal: No diarrhea, constipation, abdominal pain or other complaints noted  Musculoskeletal:  low back pain  Neurologic:Pt. denies syncope, seizures, paralysis, involuntary movements or gait problems  Skin: No problems with hair or nails. No rash. No new skin lesions.     OBJEC[RS1.3M]TIVE:  /82  Pulse 85  Temp 97.9 °F (36.6 °C) (Temporal)   Resp 18  Ht 6' (1.829 m)  Wt 175 lb (79.4 kg)  SpO2 96%  BMI 23.73 kg/m2     Physical exam[RS1.1T]  General appearance - alert & oriented, pleasant and comfortable  Skin - Skin color, texture, turgor normal. No rashes or lesions.  Lungs - normal respiratory rate and rhythm, lungs clear, no wheeze, no rales, no rhonchi.  Heart - regular rate and rhythm, S1 normal, S2 normal, no murmurs, clicks, or gallops.  Musculoskeletal -he has a normal upright posture walks without the use of assistive device   Lumbar spine exam: Upon inspection there does seem to be some muscle spasm pulling him to the left.  On palpation he is tender over the L3,L4,L5 spinous process.  Full lateral bending and extension.  significant pain and limitations with flexion at 15 degrees.  Positive straight  leg raise for low back pain bilaterally, worse on the left.     Neuro - +2 patellar and achilles reflexes bilateraly.  5/5 LE strength    ASS[RS1.3M]ESSMENT/PLAN[RS1.1T]  1. Acute midline low back pain without sciatica[RS1.2T] / muscle spasm back  -due to a history of trauma at the onset of this discomfort I do recommend checking a plain radiographs after appointment today to evaluate for compression or other fracture.  Rehab consultation.  Nabumetone 750 mg 1 tablet twice daily as directed and cyclobenzaprine 5 mg 1-2 tablets 3 times a day as needed for muscle spasm.  Dosage and side effect profile discussed.  Signs and symptoms that would require follow-up for further evaluation discussed[RS1.3M]       Schedule follow-up:[RS1.1T] prn[RS1.3M]    Signed Electronically Signed by:    CRUZITO Velasquez , 4/16/2018  Supervising Physician:[RS1.1T]  Dr. Quintana[RS1.3M]        Revision History        User Key Date/Time User Provider Type Action    > RS1.2 04/16/18 12:25 PM Elieser Sauceda PA Physician Assistant Sign     RS1.3 04/16/18 12:17 PM Elieser Sauceda PA Physician Assistant      RS1.1 04/16/18 08:16 AM Elieser Sauceda PA Physician Assistant     M - Manual, T - Template

## 2024-02-19 ENCOUNTER — NURSE ONLY (OUTPATIENT)
Dept: HEMATOLOGY/ONCOLOGY | Facility: HOSPITAL | Age: 54
End: 2024-02-19
Attending: THORACIC SURGERY (CARDIOTHORACIC VASCULAR SURGERY)
Payer: COMMERCIAL

## 2024-02-19 DIAGNOSIS — C34.91 ADENOCARCINOMA OF RIGHT LUNG (HCC): Primary | ICD-10-CM

## 2024-02-19 DIAGNOSIS — Z71.9 ENCOUNTER FOR HEALTH EDUCATION: ICD-10-CM

## 2024-02-19 DIAGNOSIS — Z01.89 LABORATORY EXAMINATION: ICD-10-CM

## 2024-02-19 LAB
ALBUMIN SERPL-MCNC: 4.4 G/DL (ref 3.2–4.8)
ALBUMIN/GLOB SERPL: 1.6 {RATIO} (ref 1–2)
ALP LIVER SERPL-CCNC: 77 U/L
ALT SERPL-CCNC: 18 U/L
ANION GAP SERPL CALC-SCNC: 7 MMOL/L (ref 0–18)
AST SERPL-CCNC: 17 U/L (ref ?–34)
BASOPHILS # BLD AUTO: 0.1 X10(3) UL (ref 0–0.2)
BASOPHILS NFR BLD AUTO: 1.2 %
BILIRUB SERPL-MCNC: 0.4 MG/DL (ref 0.3–1.2)
BUN BLD-MCNC: 8 MG/DL (ref 9–23)
BUN/CREAT SERPL: 9.8 (ref 10–20)
CALCIUM BLD-MCNC: 9.5 MG/DL (ref 8.7–10.4)
CHLORIDE SERPL-SCNC: 106 MMOL/L (ref 98–112)
CO2 SERPL-SCNC: 27 MMOL/L (ref 21–32)
CREAT BLD-MCNC: 0.82 MG/DL
DEPRECATED RDW RBC AUTO: 46.9 FL (ref 35.1–46.3)
EGFRCR SERPLBLD CKD-EPI 2021: 85 ML/MIN/1.73M2 (ref 60–?)
EOSINOPHIL # BLD AUTO: 0.24 X10(3) UL (ref 0–0.7)
EOSINOPHIL NFR BLD AUTO: 2.8 %
ERYTHROCYTE [DISTWIDTH] IN BLOOD BY AUTOMATED COUNT: 13.2 % (ref 11–15)
GLOBULIN PLAS-MCNC: 2.7 G/DL (ref 2.8–4.4)
GLUCOSE BLD-MCNC: 93 MG/DL (ref 70–99)
HCT VFR BLD AUTO: 39.1 %
HGB BLD-MCNC: 13.4 G/DL
IMM GRANULOCYTES # BLD AUTO: 0.03 X10(3) UL (ref 0–1)
IMM GRANULOCYTES NFR BLD: 0.4 %
LYMPHOCYTES # BLD AUTO: 2.21 X10(3) UL (ref 1–4)
LYMPHOCYTES NFR BLD AUTO: 26.2 %
MCH RBC QN AUTO: 33.4 PG (ref 26–34)
MCHC RBC AUTO-ENTMCNC: 34.3 G/DL (ref 31–37)
MCV RBC AUTO: 97.5 FL
MONOCYTES # BLD AUTO: 0.46 X10(3) UL (ref 0.1–1)
MONOCYTES NFR BLD AUTO: 5.5 %
NEUTROPHILS # BLD AUTO: 5.4 X10 (3) UL (ref 1.5–7.7)
NEUTROPHILS # BLD AUTO: 5.4 X10(3) UL (ref 1.5–7.7)
NEUTROPHILS NFR BLD AUTO: 63.9 %
OSMOLALITY SERPL CALC.SUM OF ELEC: 288 MOSM/KG (ref 275–295)
PLATELET # BLD AUTO: 368 10(3)UL (ref 150–450)
POTASSIUM SERPL-SCNC: 4.2 MMOL/L (ref 3.5–5.1)
PROT SERPL-MCNC: 7.1 G/DL (ref 5.7–8.2)
RBC # BLD AUTO: 4.01 X10(6)UL
SODIUM SERPL-SCNC: 140 MMOL/L (ref 136–145)
WBC # BLD AUTO: 8.4 X10(3) UL (ref 4–11)

## 2024-02-19 PROCEDURE — 85025 COMPLETE CBC W/AUTO DIFF WBC: CPT

## 2024-02-19 PROCEDURE — 36415 COLL VENOUS BLD VENIPUNCTURE: CPT

## 2024-02-19 PROCEDURE — 99215 OFFICE O/P EST HI 40 MIN: CPT | Performed by: NURSE PRACTITIONER

## 2024-02-19 PROCEDURE — 80053 COMPREHEN METABOLIC PANEL: CPT

## 2024-02-19 RX ORDER — DEXAMETHASONE 4 MG/1
4 TABLET ORAL 2 TIMES DAILY
Qty: 24 TABLET | Refills: 0 | Status: SHIPPED | OUTPATIENT
Start: 2024-02-19

## 2024-02-19 RX ORDER — ONDANSETRON HYDROCHLORIDE 8 MG/1
8 TABLET, FILM COATED ORAL EVERY 8 HOURS PRN
Qty: 30 TABLET | Refills: 3 | Status: SHIPPED | OUTPATIENT
Start: 2024-02-19

## 2024-02-19 RX ORDER — FOLIC ACID 1 MG/1
1 TABLET ORAL DAILY
Qty: 30 TABLET | Refills: 5 | Status: SHIPPED | OUTPATIENT
Start: 2024-02-19

## 2024-02-19 RX ORDER — PROCHLORPERAZINE MALEATE 10 MG
10 TABLET ORAL EVERY 6 HOURS PRN
Qty: 30 TABLET | Refills: 3 | Status: SHIPPED | OUTPATIENT
Start: 2024-02-19

## 2024-02-19 RX ORDER — CYANOCOBALAMIN 1000 UG/ML
1000 INJECTION, SOLUTION INTRAMUSCULAR; SUBCUTANEOUS ONCE
OUTPATIENT
Start: 2024-02-22

## 2024-02-19 NOTE — PATIENT INSTRUCTIONS
Medication Education Record: IV Therapy    Learner:  Patient    Barriers / Limitations:  None    Psychosocial Assessment:  patient psychosocial response appropriate    Diagnosis:   adenocarcinoma of lung    IV Cancer Treatment Name(s): Cisplatin Pemetrexed  IV Cancer Treatment Frequency every 3 weeks    Number of cycles planned 4 cycles planned  Plan for appointments and lab testing prior to each cycle  Verified Consent to Chemotherapy/Biotherapy Cancer Treatment form signed by patient and provider:  Yes    Confirm patient informs his/her Cancer Care team of any treatment received in a setting other than at the [facility] (such as inpatient or outpatient at another hospital or clinic, locally or out of state) so that this medical information can accurately be reflected in his/her medical record.  This vital information will provide an accurate picture for the physician prescribing the current cancer treatment.Yes  Cancer Treatment Side Effects (refer to Chemo Care Handouts for further information):  Allergic reactions  Constipation  Diarrhea  Eye disorders  Fatigue  Forgetfulness  Hair loss  Heart effects  Hormone gland changes   Kidney / Bladder effects  Liver effects  Loss of appetite  Low red blood cell count / Anemia  Low White Blood Cell Count/Risk of infection  Low Platelet Count/Risk of Bleeding  Lung Effects  Mouth or Throat Sores  Muscle / Bone Effects  Nausea / Vomiting  Nerve Effects  Reproductive / Fertility Effects  Secondary Malignancies  Skin Effects  Taste Changes    IV administration risks:  Potential leaking of drug outside of vein during administration   Signs/symptoms include redness, swelling, pain, burning at the site of administration  Notify Infusion Nurse immediately if any of these symptoms occur during or after the infusion  Allergic reaction: there is a chance for allergic reaction with some medications.  If your prescribed therapy has a higher risk for this, steps  will be taken to prevent and minimize this from occurring.    Recommended Anti-nausea medications (as directed by your provider):  Prochloperazine (Compazine) 10 mg every 6 hours and Ondansetron (Zofran) 8 mg every 8 hours  Take as needed    Other drug specific:   Steroid prep Dexamethasone   4 mg twice daily on day 2 and day 3  Helpful hints during cancer treatment:    Diet:  Avoid greasy or spicy foods on days surrounding chemotherapy  Eat small frequent meals per day (6-7 meals) rather than 3 large meals  Choose high calorie/high protein foods (chicken, hard cooked eggs, peanut butter, cheese)  If nauseated, try dry foods, such as toast, crackers or pretzels; light or bland foods, such as applesauce or oatmeal.    Fluid intake:  Drink 8-10 cups of liquid a day and take a water bottle wherever you go.  Any fluid is acceptable, but caffeinated products do not count towards your intake and should be limited to 1-2 drinks/day.    Physical Activity    If your doctor approves, be as physically active as you can, but start out slowly, and increase your activity over time as you feel stronger.  Listen to your body and rest when you need to.  Do what you can when you feel up to it.      Oral Care  Keep your mouth clean.  Rinse your mouth before and after meals with plain water or with a mild mouth rinse (made with 1 quart water, 1 teaspoon salt, and 1 teaspoon baking soda, shake before using)   Avoid commercial mouthwashes that contain alcohol, alcoholic or acidic drinks or tobacco  An acceptable mouthwash is Biotene®   If soreness or sores develop, contact the office.    Diarrhea or Constipation    Imodium A-D use for diarrhea:  Take 2 tabs (4mg) at the first sign of diarrhea; then take 1 tab (2mg) every 2 hours until you have had no diarrhea for at least 12 hours; during the night take 2 tabs (4mg) every 4 hours as needed.  Maximum of 8 tablets in 24 hours.   Constipation:  2 senna-s tablets at bedtime; add 1-2 in AM if  needed; add miralax powder daily in 8 oz of any fluid daily  If you have persistent diarrhea or constipation, please contact the triage nurse for further instructions.  Skin Care  Avoid direct sunlight.  Wear a broad-spectrum sunscreen with an SPF of 30 or higher on any skin exposed to the sun.  Re-apply every 2 hours if in the sun and after bathing or sweating.  For dry skin, use an alcohol-free lotion twice per day, especially after baths.  If scalp hair loss has occurred, protect the scalp from the sun by wearing a hat and use sunscreen.  Apply alcohol-free moisturizer as needed.    When to call the doctor:  Fever of 100.5 or greater or shaking chills  Nausea/vomiting not controlled with anti-nausea medications: Unable to drink for 24 hours or have signs of dehydration: tiredness, thirst, dry mouth, dark and decreased amount of urine  Diarrhea - not controlled with Imodium AD or more than 6 episodes in 24 hours  Constipation -no bowel movement x 3 days, no response to stool softeners or laxatives  Mouth sores, sore throat or blisters on the lips affecting oral intake  Difficulty breathing, chest pain, or new cough  Excessive tiredness or weakness, confusion or loss of balance  New rash  Tingling or burning, redness, swelling of the palms of hands or soles of feet  Sudden new unexpected symptom -such as change in vision, swelling in arm or leg  Increase in numbness and tingling of hands or feet  Unusual bleeding (nose bleeds, blood in urine, stool or phlegm)  Pain with urination  Persistent mood changes, depression, nervousness, difficulty sleeping   Pain, redness, swelling or blistering at the IV site  If you go to Emergency Room for any reason or seek medical attention elsewhere  If you should need to cancel or reschedule any visit, it is important that you contact the office    What Phone Number to Call:  Cancer Center [phone] / Triage Nurse    Teaching Materials Provided:   Chemo Care chemotherapy  information sheets     Please refer to the following link if you are interested in additional information about chemotherapy for yourself or family members: [email or web address]    Safety and Handling of Chemotherapy  While you or your family member is receiving chemotherapy, whether in the clinic or at home, the following precautions must be taken to lessen any exposure to the medication.     Handling Body Waste:   Caregivers must wear gloves if exposed to the patient’s blood, urine, stool or vomit.  Dispose of the used gloves after each use and wash hands with soap and water.  Any sheets or clothes soiled with the bodily fluids should be machine washed twice in hot water with regular laundry detergent.  Do not wash soiled garments with hands.  If the soiled garments cannot be washed right away, place them in a sealed plastic bag until they can be washed.   Absorbable undergarments, or any other items contaminated with chemotherapy, should be placed in a sealed plastic bag for disposal, separate from other trash.  Toilets should be flushed twice with the lid closed while taking this medication and for 48 hours after the last dose.  Wash your hands after using the toilet.  Wash any skin area that has come in contact with urine or stool.      Safety for my family and friends:  Due to safety concerns and the nature of this treatment environment, children are not permitted in the infusion center.  Please make appropriate arrangements.   Hugging and kissing is safe for you and your partner or family members.  You can visit, sit with, hug and kiss the children in your life.    You can be around pregnant women, though (if possible) they should not clean up any of your body fluids after you have treatment.   Sexual activity is safe while throughout treatment.  It is possible that traces of the oral chemotherapy may be present in vaginal fluid or semen for 48 hours after taking.  A condom should  be used during this time.  Effective birth control should be used throughout treatment to prevent pregnancy while on these medications and for several months or years after therapy.  Chemotherapy can have harmful side effects to the fetus, especially in the first trimester.  In addition, menstrual cycles can become irregular during and after treatment, so you may not know if you are at a time in your cycle when you could become pregnant or if you are actually pregnant.

## 2024-02-19 NOTE — PROGRESS NOTES
Medication Education Record: IV Therapy    Learner:  Patient    Barriers / Limitations:  None    Psychosocial Assessment:  patient psychosocial response appropriate    Diagnosis:   adenocarcinoma of lung    IV Cancer Treatment Name(s): Cisplatin Pemetrexed  IV Cancer Treatment Frequency every 3 weeks    Number of cycles planned 4 cycles planned  Plan for appointments and lab testing prior to each cycle  Verified Consent to Chemotherapy/Biotherapy Cancer Treatment form signed by patient and provider:  Yes    Confirm patient informs his/her Cancer Care team of any treatment received in a setting other than at the [facility] (such as inpatient or outpatient at another hospital or clinic, locally or out of state) so that this medical information can accurately be reflected in his/her medical record.  This vital information will provide an accurate picture for the physician prescribing the current cancer treatment.Yes  Cancer Treatment Side Effects (refer to Chemo Care Handouts for further information):  Allergic reactions  Constipation  Diarrhea  Eye disorders  Fatigue  Forgetfulness  Hair loss  Heart effects  Hormone gland changes   Kidney / Bladder effects  Liver effects  Loss of appetite  Low red blood cell count / Anemia  Low White Blood Cell Count/Risk of infection  Low Platelet Count/Risk of Bleeding  Lung Effects  Mouth or Throat Sores  Muscle / Bone Effects  Nausea / Vomiting  Nerve Effects  Reproductive / Fertility Effects  Secondary Malignancies  Skin Effects  Taste Changes    IV administration risks:  Potential leaking of drug outside of vein during administration   Signs/symptoms include redness, swelling, pain, burning at the site of administration  Notify Infusion Nurse immediately if any of these symptoms occur during or after the infusion  Allergic reaction: there is a chance for allergic reaction with some medications.  If your prescribed therapy has a higher risk for this, steps  will be taken to prevent and minimize this from occurring.    Recommended Anti-nausea medications (as directed by your provider):  Prochloperazine (Compazine) 10 mg every 6 hours and Ondansetron (Zofran) 8 mg every 8 hours  Take as needed    Other drug specific:   Steroid prep Dexamethasone   4 mg twice daily on day 2 and day 3  Helpful hints during cancer treatment:    Diet:  Avoid greasy or spicy foods on days surrounding chemotherapy  Eat small frequent meals per day (6-7 meals) rather than 3 large meals  Choose high calorie/high protein foods (chicken, hard cooked eggs, peanut butter, cheese)  If nauseated, try dry foods, such as toast, crackers or pretzels; light or bland foods, such as applesauce or oatmeal.    Fluid intake:  Drink 8-10 cups of liquid a day and take a water bottle wherever you go.  Any fluid is acceptable, but caffeinated products do not count towards your intake and should be limited to 1-2 drinks/day.    Physical Activity    If your doctor approves, be as physically active as you can, but start out slowly, and increase your activity over time as you feel stronger.  Listen to your body and rest when you need to.  Do what you can when you feel up to it.      Oral Care  Keep your mouth clean.  Rinse your mouth before and after meals with plain water or with a mild mouth rinse (made with 1 quart water, 1 teaspoon salt, and 1 teaspoon baking soda, shake before using)   Avoid commercial mouthwashes that contain alcohol, alcoholic or acidic drinks or tobacco  An acceptable mouthwash is Biotene®   If soreness or sores develop, contact the office.    Diarrhea or Constipation    Imodium A-D use for diarrhea:  Take 2 tabs (4mg) at the first sign of diarrhea; then take 1 tab (2mg) every 2 hours until you have had no diarrhea for at least 12 hours; during the night take 2 tabs (4mg) every 4 hours as needed.  Maximum of 8 tablets in 24 hours.   Constipation:  2 senna-s tablets at bedtime; add 1-2 in AM if  needed; add miralax powder daily in 8 oz of any fluid daily  If you have persistent diarrhea or constipation, please contact the triage nurse for further instructions.  Skin Care  Avoid direct sunlight.  Wear a broad-spectrum sunscreen with an SPF of 30 or higher on any skin exposed to the sun.  Re-apply every 2 hours if in the sun and after bathing or sweating.  For dry skin, use an alcohol-free lotion twice per day, especially after baths.  If scalp hair loss has occurred, protect the scalp from the sun by wearing a hat and use sunscreen.  Apply alcohol-free moisturizer as needed.    When to call the doctor:  Fever of 100.5 or greater or shaking chills  Nausea/vomiting not controlled with anti-nausea medications: Unable to drink for 24 hours or have signs of dehydration: tiredness, thirst, dry mouth, dark and decreased amount of urine  Diarrhea - not controlled with Imodium AD or more than 6 episodes in 24 hours  Constipation -no bowel movement x 3 days, no response to stool softeners or laxatives  Mouth sores, sore throat or blisters on the lips affecting oral intake  Difficulty breathing, chest pain, or new cough  Excessive tiredness or weakness, confusion or loss of balance  New rash  Tingling or burning, redness, swelling of the palms of hands or soles of feet  Sudden new unexpected symptom -such as change in vision, swelling in arm or leg  Increase in numbness and tingling of hands or feet  Unusual bleeding (nose bleeds, blood in urine, stool or phlegm)  Pain with urination  Persistent mood changes, depression, nervousness, difficulty sleeping   Pain, redness, swelling or blistering at the IV site  If you go to Emergency Room for any reason or seek medical attention elsewhere  If you should need to cancel or reschedule any visit, it is important that you contact the office    What Phone Number to Call:  Cancer Center [phone] / Triage Nurse    Teaching Materials Provided:   Chemo Care chemotherapy  information sheets     Please refer to the following link if you are interested in additional information about chemotherapy for yourself or family members: [email or web address]    Safety and Handling of Chemotherapy  While you or your family member is receiving chemotherapy, whether in the clinic or at home, the following precautions must be taken to lessen any exposure to the medication.     Handling Body Waste:   Caregivers must wear gloves if exposed to the patient’s blood, urine, stool or vomit.  Dispose of the used gloves after each use and wash hands with soap and water.  Any sheets or clothes soiled with the bodily fluids should be machine washed twice in hot water with regular laundry detergent.  Do not wash soiled garments with hands.  If the soiled garments cannot be washed right away, place them in a sealed plastic bag until they can be washed.   Absorbable undergarments, or any other items contaminated with chemotherapy, should be placed in a sealed plastic bag for disposal, separate from other trash.  Toilets should be flushed twice with the lid closed while taking this medication and for 48 hours after the last dose.  Wash your hands after using the toilet.  Wash any skin area that has come in contact with urine or stool.      Safety for my family and friends:  Due to safety concerns and the nature of this treatment environment, children are not permitted in the infusion center.  Please make appropriate arrangements.   Hugging and kissing is safe for you and your partner or family members.  You can visit, sit with, hug and kiss the children in your life.    You can be around pregnant women, though (if possible) they should not clean up any of your body fluids after you have treatment.   Sexual activity is safe while throughout treatment.  It is possible that traces of the oral chemotherapy may be present in vaginal fluid or semen for 48 hours after taking.  A condom should  be used during this time.  Effective birth control should be used throughout treatment to prevent pregnancy while on these medications and for several months or years after therapy.  Chemotherapy can have harmful side effects to the fetus, especially in the first trimester.  In addition, menstrual cycles can become irregular during and after treatment, so you may not know if you are at a time in your cycle when you could become pregnant or if you are actually pregnant.    Educated and counseled on above treatment plan and supportive care for 40 minutes  HIPOLITO Abarca

## 2024-02-22 ENCOUNTER — OFFICE VISIT (OUTPATIENT)
Dept: HEMATOLOGY/ONCOLOGY | Facility: HOSPITAL | Age: 54
End: 2024-02-22
Attending: THORACIC SURGERY (CARDIOTHORACIC VASCULAR SURGERY)
Payer: COMMERCIAL

## 2024-02-22 VITALS
DIASTOLIC BLOOD PRESSURE: 45 MMHG | HEART RATE: 83 BPM | TEMPERATURE: 98 F | RESPIRATION RATE: 18 BRPM | SYSTOLIC BLOOD PRESSURE: 108 MMHG | HEIGHT: 70 IN | OXYGEN SATURATION: 99 % | BODY MASS INDEX: 27.92 KG/M2 | WEIGHT: 195 LBS

## 2024-02-22 DIAGNOSIS — C34.91 ADENOCARCINOMA OF RIGHT LUNG (HCC): Primary | ICD-10-CM

## 2024-02-22 NOTE — PROGRESS NOTES
Thoracic Surgery Post-Op Progress Note    Ms. Gibson is a 54 year old female who presents today in follow up after a right VATS upper lobectomy performed on 2/8/24. She is doing well. She complains of some soreness across her right chest. It feels like a \"pulled muscle\". This is controlled with tylenol and oxycodone at night. She also reports some numbness over her right underarm. She denies fevers or chills. No cough. No hemoptysis. No shortness of breath. No worsening pain, swelling, or drainage from wounds. She has been staying active and using incentive spirometry at home.    PMH:  Past Medical History:   Diagnosis Date    Cancer (HCC)     H/O: hysterectomy     Shortness of breath        Meds:    Current Outpatient Medications:     prochlorperazine (COMPAZINE) 10 mg tablet, Take 1 tablet (10 mg total) by mouth every 6 (six) hours as needed for Nausea., Disp: 30 tablet, Rfl: 3    ondansetron (ZOFRAN) 8 MG tablet, Take 1 tablet (8 mg total) by mouth every 8 (eight) hours as needed for Nausea., Disp: 30 tablet, Rfl: 3    folic acid 1 MG Oral Tab, Take 1 tablet (1 mg total) by mouth daily., Disp: 30 tablet, Rfl: 5    dexamethasone (DECADRON) 4 MG tablet, Take 1 tablet (4 mg total) by mouth 2 (two) times daily. Take twice a day for 2 days, starting day after chemotherapy (days 2 and 3) for each cycle., Disp: 24 tablet, Rfl: 0    oxyCODONE 5 MG Oral Tab, Take 1 tablet (5 mg total) by mouth every 4 (four) hours as needed for Pain., Disp: 30 tablet, Rfl: 0    albuterol 108 (90 Base) MCG/ACT Inhalation Aero Soln, Inhale 2 puffs into the lungs every 6 (six) hours as needed for Wheezing. (Patient not taking: Reported on 2/15/2024), Disp: 8.5 g, Rfl: 5    nicotine 21 MG/24HR Transdermal Patch 24 Hr, Apply 1 patch (21mg) daily for 2 weeks, then apply 1 patch (14mg) daily for 2 weeks, then apply 1 patch (7mg) daily for 2 weeks (Patient not taking: Reported on 2/15/2024), Disp: 14 patch, Rfl: 0    meclizine 25 MG Oral Tab,  Take 1 tablet (25 mg total) by mouth 3 (three) times daily as needed., Disp: 21 tablet, Rfl: 0    Vital Signs:    /45 (BP Location: Right arm, Patient Position: Sitting, Cuff Size: large)   Pulse 83   Temp 98.3 °F (36.8 °C) (Oral)   Resp 18   Ht 1.778 m (5' 10\")   Wt 88.5 kg (195 lb)   SpO2 99%   BMI 27.98 kg/m²     Physical Exam:    General: well appearing female in no acute distress  HEENT: Normocephalic, PERRL, EOMI  Heart: regular rate and rhythm. No murmurs, rubs or gallops. No lower extremity edema.  Lungs: Normal respiratory effort. Clear to ascultation bilaterally.  Chest: incisions are clean, dry and intact. No signs of infection. Chest tube stitch removed.  Abdomen: Soft, Non-tender, non-distended. No hepatosplenomegaly noted.  Extremities: No clubbing or cyanosis. No lateralizing weakness  Neuro: No gross cranial nerve defects, numbness over T2 dermatome  Psych: oriented to person place and time, normal mood and affect    Pathology:  Final Diagnosis:   A.  Level 10 lymph nodes, excision:  -Two lymph nodes, negative for tumor (0/2).     B.  Level 11 lymph nodes, excision:  -Five lymph nodes, negative for tumor (0/5).     C.  Lung, right upper lobe, lobectomy:  -Invasive adenocarcinoma (total size 2.8 cm, invasion 2.8 cm), micropapillary and acinar.  -The margins are negative for tumor.  -The tumor involves the visceral pleura (span of 5 mm).  -One peribronchial lymph node, negative for tumor (0/1).     D.  Level 7 lymph nodes, excision:  -Nineteen lymph nodes, negative for tumor (0/19).     E.  Level 4 lymph nodes, excision:  -Metastatic adenocarcinoma involving 4 of 8 lymph nodes (4/8).     F.  Level 2 lymph nodes, excision:  -Metastatic adenocarcinoma involving 4 of 4 lymph nodes (4/4).     Assesment:    Ms. Gibson is a 54 year old female who presents today in follow up after a right VATS upper lobectomy performed on 2/8/24 for known lung cancer. Final pathology revealed stage IIIA invasive  adenocarcinoma, T2aN2. She is doing quite well. Ms. Gibson has no apparent complications from the surgery. We will continue to follow her as needed in the postoperative period. Long term surveillance will be with Dr. Mccurdy. She met with him last week.  She has no restriction and we have encouraged an active lifestyle.     Plan:  Encouraged continued exercise and incentive spirometer use.  No activity restrictions  Ms. Gibson should follow up with thoracic surgery on an as needed basis. She is encouraged to call with any further questions or concerns.     MICHOACANO Carvajal-C  Thoracic Surgery

## 2024-02-23 ENCOUNTER — SOCIAL WORK SERVICES (OUTPATIENT)
Dept: HEMATOLOGY/ONCOLOGY | Facility: HOSPITAL | Age: 54
End: 2024-02-23

## 2024-02-23 ENCOUNTER — NURSE ONLY (OUTPATIENT)
Dept: HEMATOLOGY/ONCOLOGY | Facility: HOSPITAL | Age: 54
End: 2024-02-23
Attending: THORACIC SURGERY (CARDIOTHORACIC VASCULAR SURGERY)
Payer: COMMERCIAL

## 2024-02-23 VITALS
RESPIRATION RATE: 18 BRPM | OXYGEN SATURATION: 98 % | SYSTOLIC BLOOD PRESSURE: 107 MMHG | TEMPERATURE: 98 F | DIASTOLIC BLOOD PRESSURE: 55 MMHG | HEART RATE: 72 BPM

## 2024-02-23 DIAGNOSIS — C34.91 ADENOCARCINOMA OF RIGHT LUNG (HCC): Primary | ICD-10-CM

## 2024-02-23 LAB
% TUMOR CELLS STAINING: 40 %
% TUMOR CELLS STAINING: 40 %

## 2024-02-23 PROCEDURE — 96367 TX/PROPH/DG ADDL SEQ IV INF: CPT

## 2024-02-23 PROCEDURE — 96411 CHEMO IV PUSH ADDL DRUG: CPT

## 2024-02-23 PROCEDURE — 96375 TX/PRO/DX INJ NEW DRUG ADDON: CPT

## 2024-02-23 PROCEDURE — 96413 CHEMO IV INFUSION 1 HR: CPT

## 2024-02-23 PROCEDURE — 96372 THER/PROPH/DIAG INJ SC/IM: CPT

## 2024-02-23 PROCEDURE — 96361 HYDRATE IV INFUSION ADD-ON: CPT

## 2024-02-23 PROCEDURE — 96366 THER/PROPH/DIAG IV INF ADDON: CPT

## 2024-02-23 RX ORDER — CYANOCOBALAMIN 1000 UG/ML
INJECTION, SOLUTION INTRAMUSCULAR; SUBCUTANEOUS
Status: COMPLETED
Start: 2024-02-23 | End: 2024-02-23

## 2024-02-23 RX ORDER — CYANOCOBALAMIN 1000 UG/ML
1000 INJECTION, SOLUTION INTRAMUSCULAR; SUBCUTANEOUS ONCE
Status: COMPLETED | OUTPATIENT
Start: 2024-02-23 | End: 2024-02-23

## 2024-02-23 RX ORDER — SODIUM CHLORIDE 9 MG/ML
1000 INJECTION, SOLUTION INTRAVENOUS ONCE
Status: COMPLETED | OUTPATIENT
Start: 2024-02-23 | End: 2024-02-23

## 2024-02-23 RX ADMIN — SODIUM CHLORIDE 1000 ML: 9 INJECTION, SOLUTION INTRAVENOUS at 12:32:00

## 2024-02-23 RX ADMIN — CYANOCOBALAMIN 1000 MCG: 1000 INJECTION, SOLUTION INTRAMUSCULAR; SUBCUTANEOUS at 08:26:00

## 2024-02-23 NOTE — PROGRESS NOTES
SW completed an assessment with pt to provide support and encouragement due to new chemo starting today.     Pt is a 55 y/o woman who lives in [place], [place] with her  and granddaughter.    Patient has  three adult children    Patient works full time as a     Social determinants of health assessment completed with pt and no needs identified at this time.    Pt presents with normal affect and mood.Patient has not identified any feelings of anxiety about starting chemo.     SW reviewed cancer support resources provided by [employer]. SW provided a pack of resources including information on transportation assistance, homecare/home health agencies and counseling resources. SW reviewed Advance Directives and importance of completion. SW explained role of SW in Cancer Center and provided Bringing [facility] gift bag. SW contact information given. Coping skills reviewed and support services encouraged due to new cancer dx.

## 2024-02-23 NOTE — PROGRESS NOTES
Pt here for C1D1 Alimta/Cisplatin + Vit B12 injection.  Arrives Ambulating independently, accompanied by Self     Patient was evaluated today by Treatment Nurse.    Oral medications included in this regimen:  no    Patient confirms comprehension of cancer treatment schedule:  yes    Pregnancy screening:  Not applicable    Modifications in dose or schedule:  No    Medications appearance and physical integrity checked by RN: yes.    Chemotherapy IV pump settings verified by 2 RNs:  Yes.  Frequency of blood return and site check throughout administration: Prior to administration, Prior to each drug, and At completion of therapy     Infusion/treatment outcome:  patient tolerated treatment without incident    Education Record    Learner:  Patient  Barriers / Limitations:  None  Method:  Brief focused, Discussion, and Reinforcement  Education / instructions given:  Reinforced plan of care. Discussed at home PRN anti-nausea medications and when to call MD  Outcome:  Shows understanding    B12 given to left deltoid, site covered with bandaid. Discharged Home, Ambulating independently, accompanied by:Self    Patient/family verbalized understanding of future appointments: by printed AVS

## 2024-02-23 NOTE — PATIENT INSTRUCTIONS
Recommended Anti-nausea medications (as directed by your provider):  -Prochloperazine (Compazine) 10 mg every 6 hours as needed  -Ondansetron (Zofran) 8 mg every 8 hours as needed     Other drug specific:   Steroid prep       Dexamethasone   4 mg twice daily on day 2 and day 3     When to call the doctor:  Fever of 100.5 or greater or shaking chills  Nausea/vomiting not controlled with anti-nausea medications: Unable to drink for 24 hours or have signs of dehydration: tiredness, thirst, dry mouth, dark and decreased amount of urine  Diarrhea - not controlled with Imodium AD or more than 6 episodes in 24 hours  Constipation -no bowel movement x 3 days, no response to stool softeners or laxatives  Mouth sores, sore throat or blisters on the lips affecting oral intake  Difficulty breathing, chest pain, or new cough  Excessive tiredness or weakness, confusion or loss of balance  New rash  Tingling or burning, redness, swelling of the palms of hands or soles of feet  Sudden new unexpected symptom -such as change in vision, swelling in arm or leg  Increase in numbness and tingling of hands or feet  Unusual bleeding (nose bleeds, blood in urine, stool or phlegm)  Pain with urination  Persistent mood changes, depression, nervousness, difficulty sleeping   Pain, redness, swelling or blistering at the IV site  If you go to Emergency Room for any reason or seek medical attention elsewhere  If you should need to cancel or reschedule any visit, it is important that you contact the office     What Phone Number to Call:  Cancer Center [phone] / Triage Nurse

## 2024-02-26 ENCOUNTER — TELEPHONE (OUTPATIENT)
Dept: HEMATOLOGY/ONCOLOGY | Facility: HOSPITAL | Age: 54
End: 2024-02-26

## 2024-02-26 NOTE — TELEPHONE ENCOUNTER
Toxicities: C1 D1 Pembetrexed/Cisplatin on 2/23/2024    Emy reports she feels \"ok.\" No side effects at this time. I encouraged her to please call the office if she is not feeling well or she has any questions or concerns. She agreed and thanked me for checking on her.

## 2024-02-28 ENCOUNTER — TELEPHONE (OUTPATIENT)
Dept: HEMATOLOGY/ONCOLOGY | Facility: HOSPITAL | Age: 54
End: 2024-02-28

## 2024-02-28 NOTE — TELEPHONE ENCOUNTER
[employer]lica () [employer] needs information on the patient's treatment so she can authorize it.  Please call back.  Thank you.

## 2024-02-28 NOTE — TELEPHONE ENCOUNTER
Faxed over required documents that [employer]  needed, she called this am with some questions regarding her treatment plan. Those questions were answered.

## 2024-02-29 LAB
CELLS ANALYZED ALK FISH LUNG NSC: 50
CELLS COUNTEDALK FISH LUNG NSC: 50

## 2024-03-06 ENCOUNTER — SOCIAL WORK SERVICES (OUTPATIENT)
Dept: HEMATOLOGY/ONCOLOGY | Facility: HOSPITAL | Age: 54
End: 2024-03-06

## 2024-03-06 NOTE — PROGRESS NOTES
AKI assisted with [facility] paperwork for patient. AKI faxed the paperwork to pat's employer at [phone]. AKI sent a copy to patient's Mychart.

## 2024-03-14 ENCOUNTER — OFFICE VISIT (OUTPATIENT)
Dept: HEMATOLOGY/ONCOLOGY | Facility: HOSPITAL | Age: 54
End: 2024-03-14
Attending: THORACIC SURGERY (CARDIOTHORACIC VASCULAR SURGERY)
Payer: COMMERCIAL

## 2024-03-14 ENCOUNTER — TELEPHONE (OUTPATIENT)
Dept: HEMATOLOGY/ONCOLOGY | Facility: HOSPITAL | Age: 54
End: 2024-03-14

## 2024-03-14 VITALS
HEIGHT: 70 IN | RESPIRATION RATE: 18 BRPM | SYSTOLIC BLOOD PRESSURE: 120 MMHG | DIASTOLIC BLOOD PRESSURE: 43 MMHG | BODY MASS INDEX: 28.51 KG/M2 | OXYGEN SATURATION: 98 % | WEIGHT: 199.13 LBS | TEMPERATURE: 98 F | HEART RATE: 71 BPM

## 2024-03-14 DIAGNOSIS — Z51.11 CHEMOTHERAPY MANAGEMENT, ENCOUNTER FOR: ICD-10-CM

## 2024-03-14 DIAGNOSIS — D75.839 THROMBOCYTOSIS: ICD-10-CM

## 2024-03-14 DIAGNOSIS — C34.91 ADENOCARCINOMA OF RIGHT LUNG (HCC): Primary | ICD-10-CM

## 2024-03-14 DIAGNOSIS — C34.91 MALIGNANT NEOPLASM OF RIGHT LUNG, UNSPECIFIED PART OF LUNG (HCC): ICD-10-CM

## 2024-03-14 DIAGNOSIS — C34.91 ADENOCARCINOMA OF RIGHT LUNG (HCC): ICD-10-CM

## 2024-03-14 LAB
ALBUMIN SERPL-MCNC: 4.4 G/DL (ref 3.2–4.8)
ALBUMIN/GLOB SERPL: 1.7 {RATIO} (ref 1–2)
ALP LIVER SERPL-CCNC: 79 U/L
ALT SERPL-CCNC: 26 U/L
ANION GAP SERPL CALC-SCNC: 6 MMOL/L (ref 0–18)
AST SERPL-CCNC: 23 U/L (ref ?–34)
BASOPHILS # BLD AUTO: 0.06 X10(3) UL (ref 0–0.2)
BASOPHILS NFR BLD AUTO: 0.9 %
BILIRUB SERPL-MCNC: 0.3 MG/DL (ref 0.3–1.2)
BUN BLD-MCNC: 9 MG/DL (ref 9–23)
BUN/CREAT SERPL: 11.8 (ref 10–20)
CALCIUM BLD-MCNC: 9.5 MG/DL (ref 8.7–10.4)
CHLORIDE SERPL-SCNC: 107 MMOL/L (ref 98–112)
CO2 SERPL-SCNC: 26 MMOL/L (ref 21–32)
CREAT BLD-MCNC: 0.76 MG/DL
DEPRECATED RDW RBC AUTO: 45.4 FL (ref 35.1–46.3)
EGFRCR SERPLBLD CKD-EPI 2021: 93 ML/MIN/1.73M2 (ref 60–?)
EOSINOPHIL # BLD AUTO: 0.13 X10(3) UL (ref 0–0.7)
EOSINOPHIL NFR BLD AUTO: 2.1 %
ERYTHROCYTE [DISTWIDTH] IN BLOOD BY AUTOMATED COUNT: 12.7 % (ref 11–15)
GLOBULIN PLAS-MCNC: 2.6 G/DL (ref 2.8–4.4)
GLUCOSE BLD-MCNC: 92 MG/DL (ref 70–99)
HCT VFR BLD AUTO: 40 %
HGB BLD-MCNC: 13.1 G/DL
IMM GRANULOCYTES # BLD AUTO: 0.08 X10(3) UL (ref 0–1)
IMM GRANULOCYTES NFR BLD: 1.3 %
LYMPHOCYTES # BLD AUTO: 2.34 X10(3) UL (ref 1–4)
LYMPHOCYTES NFR BLD AUTO: 36.9 %
MCH RBC QN AUTO: 31.9 PG (ref 26–34)
MCHC RBC AUTO-ENTMCNC: 32.8 G/DL (ref 31–37)
MCV RBC AUTO: 97.3 FL
MONOCYTES # BLD AUTO: 0.63 X10(3) UL (ref 0.1–1)
MONOCYTES NFR BLD AUTO: 9.9 %
NEUTROPHILS # BLD AUTO: 3.1 X10 (3) UL (ref 1.5–7.7)
NEUTROPHILS # BLD AUTO: 3.1 X10(3) UL (ref 1.5–7.7)
NEUTROPHILS NFR BLD AUTO: 48.9 %
OSMOLALITY SERPL CALC.SUM OF ELEC: 286 MOSM/KG (ref 275–295)
PLATELET # BLD AUTO: 478 10(3)UL (ref 150–450)
POTASSIUM SERPL-SCNC: 4.6 MMOL/L (ref 3.5–5.1)
PROT SERPL-MCNC: 7 G/DL (ref 5.7–8.2)
RBC # BLD AUTO: 4.11 X10(6)UL
SODIUM SERPL-SCNC: 139 MMOL/L (ref 136–145)
WBC # BLD AUTO: 6.3 X10(3) UL (ref 4–11)

## 2024-03-14 PROCEDURE — 80053 COMPREHEN METABOLIC PANEL: CPT

## 2024-03-14 PROCEDURE — 85025 COMPLETE CBC W/AUTO DIFF WBC: CPT

## 2024-03-14 PROCEDURE — 99215 OFFICE O/P EST HI 40 MIN: CPT | Performed by: INTERNAL MEDICINE

## 2024-03-14 PROCEDURE — 36415 COLL VENOUS BLD VENIPUNCTURE: CPT

## 2024-03-14 RX ORDER — SODIUM CHLORIDE 9 MG/ML
1000 INJECTION, SOLUTION INTRAVENOUS ONCE
Status: CANCELLED | OUTPATIENT
Start: 2024-03-14

## 2024-03-14 NOTE — TELEPHONE ENCOUNTER
robert from [facility] is calling to ask a few questions regarding prior auth for the chemo treatment-NL

## 2024-03-14 NOTE — PROGRESS NOTES
Oncology note    Lung Cancer     Requesting: Dr. Rivera    HPI:  54 year old  With right upper lobe adenocarcinoma s/p  right upper lobectomy MLND pT2N2 24    EGFR, ALK negative, PDL1 40%    Initiated adjuvant Cisplatin Pemetrexed 24.       Interval History:  Returns for consideration of next cycle of Cisplatin Pemetrexed. Feeling relatively well. No fevers/chills. No changes in breathing. No changes in bowel or bladder habits. No CIPN or focal neurological deficits. No active bleeding or skin rash. Oral intake is adequate.          Past Medical History:   Diagnosis Date    Adenocarcinoma of lung, right (HCC)     T2aN2    Cancer (HCC)     H/O: hysterectomy     Shortness of breath      Social History     Socioeconomic History    Marital status:    Tobacco Use    Smoking status: Former     Packs/day: .5     Types: Cigarettes     Quit date: 2024     Years since quittin.1    Smokeless tobacco: Never   Vaping Use    Vaping Use: Never used   Substance and Sexual Activity    Alcohol use: Not Currently    Drug use: Not Currently     Lung cancer in father    Current Outpatient Medications on File Prior to Visit   Medication Sig Dispense Refill    prochlorperazine (COMPAZINE) 10 mg tablet Take 1 tablet (10 mg total) by mouth every 6 (six) hours as needed for Nausea. (Patient not taking: Reported on 2024) 30 tablet 3    ondansetron (ZOFRAN) 8 MG tablet Take 1 tablet (8 mg total) by mouth every 8 (eight) hours as needed for Nausea. (Patient not taking: Reported on 2024) 30 tablet 3    folic acid 1 MG Oral Tab Take 1 tablet (1 mg total) by mouth daily. (Patient not taking: Reported on 2024) 30 tablet 5    dexamethasone (DECADRON) 4 MG tablet Take 1 tablet (4 mg total) by mouth 2 (two) times daily. Take twice a day for 2 days, starting day after chemotherapy (days 2 and 3) for each cycle. (Patient not taking: Reported on 2024) 24 tablet 0    oxyCODONE 5 MG Oral Tab Take 1 tablet  (5 mg total) by mouth every 4 (four) hours as needed for Pain. 30 tablet 0    albuterol 108 (90 Base) MCG/ACT Inhalation Aero Soln Inhale 2 puffs into the lungs every 6 (six) hours as needed for Wheezing. (Patient not taking: Reported on 2/15/2024) 8.5 g 5    [] buPROPion HCl ER, Smoking Det, 150 MG Oral Tablet 12 Hr Take 1 tablet (150 mg total) by mouth 2 (two) times daily. 60 tablet 0    nicotine 21 MG/24HR Transdermal Patch 24 Hr Apply 1 patch (21mg) daily for 2 weeks, then apply 1 patch (14mg) daily for 2 weeks, then apply 1 patch (7mg) daily for 2 weeks (Patient not taking: Reported on 2/15/2024) 14 patch 0    meclizine 25 MG Oral Tab Take 1 tablet (25 mg total) by mouth 3 (three) times daily as needed. (Patient not taking: Reported on 2024) 21 tablet 0     Current Facility-Administered Medications on File Prior to Visit   Medication Dose Route Frequency Provider Last Rate Last Admin    [COMPLETED] fosaprepitant (Emend) 150 mg in sodium chloride 0.9% 150 mL IVPB  150 mg Intravenous Once Igor Mccurdy MD   Stopped at 24 0855    [COMPLETED] ondansetron (Zofran) 16 mg, dexAMETHasone (Decadron) 12 mg in sodium chloride 0.9% 109.2 mL IVPB   Intravenous Once Igor Mccurdy MD   Stopped at 24 0819    [COMPLETED] cyanocobalamin (Vitamin B12) 1000 MCG/ML injection 1,000 mcg  1,000 mcg Intramuscular Once Bettye Rich APRN   1,000 mcg at 24 0826    [COMPLETED] PEMEtrexed (Alimta) 1,025 mg in sodium chloride 0.9% 141 mL infusion  500 mg/m2 (Treatment Plan Recorded) Intravenous Once Igor Mccurdy MD   Stopped at 24 0911    [COMPLETED] potassium chloride 20 mEq, magnesium sulfate 4 g in sodium chloride 0.9% 1,018 mL IVPB (Onc)   Intravenous Once Igor Mccurdy MD   Stopped at 24 1119    [COMPLETED] CISplatin (Platinol) 75 mg/m2 = 155 mg in sodium chloride 0.9% 405 mL infusion  75 mg/m2 (Treatment Plan Recorded) Intravenous Once Igor Mccurdy MD   Stopped at 24 8123     [COMPLETED] sodium chloride 0.9% infusion 1,000 mL  1,000 mL Intravenous Once Igor Mccurdy MD   Stopped at 02/23/24 1349     Allergies   Allergen Reactions    Singulair [Montelukast] SWELLING    Pollen OTHER (SEE COMMENTS)     Nasal congestion, runny nose     There were no vitals filed for this visit.    Nad, rr, nd, no edema, lozoya, no deformity, nl mood, nl skin          A/P:  54 year old  With right upper lobe adenocarcinoma s/p  right upper lobectomy MLND pT2N2 Stage IIIA 2/8/24.  --EGFR and ALK neg.  PDL1 40%  --Plan for cisplatin pemetrexed, followed by pembrolizumab with PDL1 40%.  Initiated Cisplatin Pemetrexed 2/23/24. Ok to treat with Cycle #2 Cisplatin Pemetrexed.    -thrombocytosis reactive. Monitor.    MDM High r/t adenocarcinoma of lung on active chemo tx.  HIPOLITO Abarca    Plan with JACKIE Rich agrees and edited above continue with next dose of cisplatin pemetrexed adjuvant therapy for right lung cancer.  Given PD-L1 expression we will plan for adjuvant pembrolizumab following chemotherapy.  Patient is tolerating treatment well exam comfortable normal respirations no lad normocytosis likely reactive monitor

## 2024-03-15 ENCOUNTER — OFFICE VISIT (OUTPATIENT)
Dept: HEMATOLOGY/ONCOLOGY | Facility: HOSPITAL | Age: 54
End: 2024-03-15
Attending: THORACIC SURGERY (CARDIOTHORACIC VASCULAR SURGERY)
Payer: COMMERCIAL

## 2024-03-15 VITALS
OXYGEN SATURATION: 99 % | DIASTOLIC BLOOD PRESSURE: 39 MMHG | WEIGHT: 199.69 LBS | SYSTOLIC BLOOD PRESSURE: 95 MMHG | RESPIRATION RATE: 16 BRPM | HEART RATE: 74 BPM | BODY MASS INDEX: 29 KG/M2 | TEMPERATURE: 97 F

## 2024-03-15 DIAGNOSIS — C34.91 ADENOCARCINOMA OF RIGHT LUNG (HCC): Primary | ICD-10-CM

## 2024-03-15 PROCEDURE — 96413 CHEMO IV INFUSION 1 HR: CPT

## 2024-03-15 PROCEDURE — 96375 TX/PRO/DX INJ NEW DRUG ADDON: CPT

## 2024-03-15 PROCEDURE — 96366 THER/PROPH/DIAG IV INF ADDON: CPT

## 2024-03-15 PROCEDURE — 96411 CHEMO IV PUSH ADDL DRUG: CPT

## 2024-03-15 PROCEDURE — 96367 TX/PROPH/DG ADDL SEQ IV INF: CPT

## 2024-03-15 PROCEDURE — 96361 HYDRATE IV INFUSION ADD-ON: CPT

## 2024-03-15 RX ORDER — SODIUM CHLORIDE 9 MG/ML
1000 INJECTION, SOLUTION INTRAVENOUS ONCE
Status: COMPLETED | OUTPATIENT
Start: 2024-03-15 | End: 2024-03-15

## 2024-03-15 RX ADMIN — SODIUM CHLORIDE 1000 ML: 9 INJECTION, SOLUTION INTRAVENOUS at 13:47:00

## 2024-03-15 NOTE — PROGRESS NOTES
Pt here for C2D1 Alimta/Cisplatin.  Arrives Ambulating independently, accompanied by Self     Patient was evaluated today by Treatment Nurse.    Oral medications included in this regimen:  no    Patient confirms comprehension of cancer treatment schedule:  yes    Pregnancy screening:  Not applicable    Modifications in dose or schedule:  No    Medications appearance and physical integrity checked by RN: yes.    Chemotherapy IV pump settings verified by 2 RNs:  Yes.  Frequency of blood return and site check throughout administration: Prior to administration, Prior to each drug, and At completion of therapy     Infusion/treatment outcome:  patient tolerated treatment without incident    Education Record    Learner:  Patient  Barriers / Limitations:  None  Method:  Reinforcement  Education / instructions given:  Reinforced plan of care  Outcome:  Shows understanding    Discharged Home, Ambulating independently, accompanied by:Self    Patient/family verbalized understanding of future appointments: by printed AVS

## 2024-04-04 ENCOUNTER — NURSE ONLY (OUTPATIENT)
Dept: HEMATOLOGY/ONCOLOGY | Facility: HOSPITAL | Age: 54
End: 2024-04-04
Attending: THORACIC SURGERY (CARDIOTHORACIC VASCULAR SURGERY)
Payer: COMMERCIAL

## 2024-04-04 VITALS
RESPIRATION RATE: 18 BRPM | HEART RATE: 88 BPM | HEIGHT: 70 IN | OXYGEN SATURATION: 99 % | BODY MASS INDEX: 28.49 KG/M2 | DIASTOLIC BLOOD PRESSURE: 58 MMHG | WEIGHT: 199 LBS | SYSTOLIC BLOOD PRESSURE: 107 MMHG | TEMPERATURE: 98 F

## 2024-04-04 DIAGNOSIS — C34.91 ADENOCARCINOMA OF RIGHT LUNG (HCC): Primary | ICD-10-CM

## 2024-04-04 DIAGNOSIS — Z51.11 CHEMOTHERAPY MANAGEMENT, ENCOUNTER FOR: ICD-10-CM

## 2024-04-04 DIAGNOSIS — D75.839 THROMBOCYTOSIS: ICD-10-CM

## 2024-04-04 LAB
ALBUMIN SERPL-MCNC: 4.4 G/DL (ref 3.2–4.8)
ALBUMIN/GLOB SERPL: 1.6 {RATIO} (ref 1–2)
ALP LIVER SERPL-CCNC: 79 U/L
ALT SERPL-CCNC: 18 U/L
ANION GAP SERPL CALC-SCNC: 7 MMOL/L (ref 0–18)
AST SERPL-CCNC: 18 U/L (ref ?–34)
BASOPHILS # BLD AUTO: 0.04 X10(3) UL (ref 0–0.2)
BASOPHILS NFR BLD AUTO: 0.6 %
BILIRUB SERPL-MCNC: 0.2 MG/DL (ref 0.3–1.2)
BUN BLD-MCNC: 10 MG/DL (ref 9–23)
BUN/CREAT SERPL: 10.6 (ref 10–20)
CALCIUM BLD-MCNC: 9.8 MG/DL (ref 8.7–10.4)
CHLORIDE SERPL-SCNC: 106 MMOL/L (ref 98–112)
CO2 SERPL-SCNC: 25 MMOL/L (ref 21–32)
CREAT BLD-MCNC: 0.94 MG/DL
DEPRECATED RDW RBC AUTO: 46.9 FL (ref 35.1–46.3)
EGFRCR SERPLBLD CKD-EPI 2021: 72 ML/MIN/1.73M2 (ref 60–?)
EOSINOPHIL # BLD AUTO: 0.05 X10(3) UL (ref 0–0.7)
EOSINOPHIL NFR BLD AUTO: 0.8 %
ERYTHROCYTE [DISTWIDTH] IN BLOOD BY AUTOMATED COUNT: 13.3 % (ref 11–15)
GLOBULIN PLAS-MCNC: 2.7 G/DL (ref 2.8–4.4)
GLUCOSE BLD-MCNC: 105 MG/DL (ref 70–99)
HCT VFR BLD AUTO: 37.2 %
HGB BLD-MCNC: 12.3 G/DL
IMM GRANULOCYTES # BLD AUTO: 0.04 X10(3) UL (ref 0–1)
IMM GRANULOCYTES NFR BLD: 0.6 %
LYMPHOCYTES # BLD AUTO: 1.79 X10(3) UL (ref 1–4)
LYMPHOCYTES NFR BLD AUTO: 28.6 %
MAGNESIUM SERPL-MCNC: 2 MG/DL (ref 1.6–2.6)
MCH RBC QN AUTO: 31.9 PG (ref 26–34)
MCHC RBC AUTO-ENTMCNC: 33.1 G/DL (ref 31–37)
MCV RBC AUTO: 96.6 FL
MONOCYTES # BLD AUTO: 0.58 X10(3) UL (ref 0.1–1)
MONOCYTES NFR BLD AUTO: 9.3 %
NEUTROPHILS # BLD AUTO: 3.76 X10 (3) UL (ref 1.5–7.7)
NEUTROPHILS # BLD AUTO: 3.76 X10(3) UL (ref 1.5–7.7)
NEUTROPHILS NFR BLD AUTO: 60.1 %
OSMOLALITY SERPL CALC.SUM OF ELEC: 285 MOSM/KG (ref 275–295)
PLATELET # BLD AUTO: 456 10(3)UL (ref 150–450)
POTASSIUM SERPL-SCNC: 4.1 MMOL/L (ref 3.5–5.1)
PROT SERPL-MCNC: 7.1 G/DL (ref 5.7–8.2)
RBC # BLD AUTO: 3.85 X10(6)UL
SODIUM SERPL-SCNC: 138 MMOL/L (ref 136–145)
WBC # BLD AUTO: 6.3 X10(3) UL (ref 4–11)

## 2024-04-04 PROCEDURE — 80053 COMPREHEN METABOLIC PANEL: CPT

## 2024-04-04 PROCEDURE — 99215 OFFICE O/P EST HI 40 MIN: CPT | Performed by: INTERNAL MEDICINE

## 2024-04-04 PROCEDURE — 85025 COMPLETE CBC W/AUTO DIFF WBC: CPT

## 2024-04-04 PROCEDURE — 83735 ASSAY OF MAGNESIUM: CPT

## 2024-04-04 PROCEDURE — 36415 COLL VENOUS BLD VENIPUNCTURE: CPT

## 2024-04-04 RX ORDER — SODIUM CHLORIDE 9 MG/ML
1000 INJECTION, SOLUTION INTRAVENOUS ONCE
Status: CANCELLED | OUTPATIENT
Start: 2024-04-04

## 2024-04-04 NOTE — PROGRESS NOTES
Oncology note    Lung Cancer     Requesting: Dr. Rivera    HPI:  54 year old  With right upper lobe adenocarcinoma s/p  right upper lobectomy MLND pT2N2 24    EGFR, ALK negative, PDL1 40%    Initiated adjuvant Cisplatin Pemetrexed 24.         Interval History:  Returns for consideration of next cycle of Cisplatin Pemetrexed. Feeling relatively well. No fevers/chills. No changes in breathing. No changes in bowel or bladder habits. Nausea controlled. No CIPN or focal neurological deficits. No active bleeding or skin rash. Oral intake is adequate. Minimal myalgia to top of foot started 1 week ago, no ADL limitations. No calf pain or swelling.          Past Medical History:   Diagnosis Date    Adenocarcinoma of lung, right (HCC)     T2aN2    Cancer (HCC)     H/O: hysterectomy     Shortness of breath      Social History     Socioeconomic History    Marital status:    Tobacco Use    Smoking status: Former     Packs/day: .5     Types: Cigarettes     Quit date: 2024     Years since quittin.2    Smokeless tobacco: Never   Vaping Use    Vaping Use: Never used   Substance and Sexual Activity    Alcohol use: Not Currently    Drug use: Not Currently     Lung cancer in father    Current Outpatient Medications on File Prior to Visit   Medication Sig Dispense Refill    prochlorperazine (COMPAZINE) 10 mg tablet Take 1 tablet (10 mg total) by mouth every 6 (six) hours as needed for Nausea. (Patient not taking: Reported on 2024) 30 tablet 3    ondansetron (ZOFRAN) 8 MG tablet Take 1 tablet (8 mg total) by mouth every 8 (eight) hours as needed for Nausea. (Patient not taking: Reported on 2024) 30 tablet 3    folic acid 1 MG Oral Tab Take 1 tablet (1 mg total) by mouth daily. (Patient not taking: Reported on 2024) 30 tablet 5    dexamethasone (DECADRON) 4 MG tablet Take 1 tablet (4 mg total) by mouth 2 (two) times daily. Take twice a day for 2 days, starting day after chemotherapy (days 2 and  3) for each cycle. (Patient not taking: Reported on 2/22/2024) 24 tablet 0    oxyCODONE 5 MG Oral Tab Take 1 tablet (5 mg total) by mouth every 4 (four) hours as needed for Pain. (Patient not taking: Reported on 3/14/2024) 30 tablet 0    albuterol 108 (90 Base) MCG/ACT Inhalation Aero Soln Inhale 2 puffs into the lungs every 6 (six) hours as needed for Wheezing. (Patient not taking: Reported on 2/15/2024) 8.5 g 5    meclizine 25 MG Oral Tab Take 1 tablet (25 mg total) by mouth 3 (three) times daily as needed. (Patient not taking: Reported on 2/22/2024) 21 tablet 0     Current Facility-Administered Medications on File Prior to Visit   Medication Dose Route Frequency Provider Last Rate Last Admin    [COMPLETED] fosaprepitant (Emend) 150 mg in sodium chloride 0.9% 150 mL IVPB  150 mg Intravenous Once Bettye Rich APRN   Stopped at 03/15/24 1009    [COMPLETED] ondansetron (Zofran) 16 mg, dexAMETHasone (Decadron) 12 mg in sodium chloride 0.9% 109.2 mL IVPB   Intravenous Once Bettye Rich APRN   Stopped at 03/15/24 0946    [COMPLETED] PEMEtrexed (Alimta) 1,025 mg in sodium chloride 0.9% 141 mL infusion  500 mg/m2 (Treatment Plan Recorded) Intravenous Once Bettye Rich APRN   Stopped at 03/15/24 1026    [COMPLETED] potassium chloride 20 mEq, magnesium sulfate 4 g in sodium chloride 0.9% 1,018 mL IVPB (Onc)   Intravenous Once Bettye Rich APRN   Stopped at 03/15/24 1236    [COMPLETED] CISplatin (Platinol) 75 mg/m2 = 155 mg in sodium chloride 0.9% 405 mL infusion  75 mg/m2 (Treatment Plan Recorded) Intravenous Once Bettye Rich APRN   Stopped at 03/15/24 1347    [COMPLETED] sodium chloride 0.9% infusion 1,000 mL  1,000 mL Intravenous Once Bettye Rich APRN   Stopped at 03/15/24 1501     Allergies   Allergen Reactions    Singulair [Montelukast] SWELLING    Pollen OTHER (SEE COMMENTS)     Nasal congestion, runny nose     Vitals:    04/04/24 1415   BP: 107/58   Pulse: 88   Resp:     Temp:        Nad, rr, nd, no edema, lozoya, no deformity, nl mood, nl skin      A/P:  54 year old  With right upper lobe adenocarcinoma s/p  right upper lobectomy MLND pT2N2 Stage IIIA 2/8/24.  --EGFR and ALK neg.  PDL1 40%  --Plan for cisplatin pemetrexed, followed by pembrolizumab with PDL1 40%.  Initiated Cisplatin Pemetrexed 2/23/24. Ok to treat with Cycle #3 Cisplatin Pemetrexed.  Imaging planned for 6 mo post-surgery or prn (~end of 7/2024)    -Thrombocytosis reactive. Monitor.    -nausea. Supportive care.monitor.      MDM High r/t adenocarcinoma of lung on active chemo tx.  HIPOLITO Abarca      Seen with JACKIE iRch agree and edited above continue with next dose of cisplatin pemetrexed adjuvant therapy for right lung cancer.  Given PD-L1 expression we will plan for adjuvant pembrolizumab following chemotherapy.  Patient is tolerating treatment well exam comfortable normal respirations no lad normocytosis likely reactive monitor

## 2024-04-04 NOTE — PATIENT INSTRUCTIONS
Pepcid over-the counter twice daily starting today and the day after steroids complete to prevent heartburn.

## 2024-04-05 ENCOUNTER — OFFICE VISIT (OUTPATIENT)
Dept: HEMATOLOGY/ONCOLOGY | Facility: HOSPITAL | Age: 54
End: 2024-04-05
Attending: THORACIC SURGERY (CARDIOTHORACIC VASCULAR SURGERY)
Payer: COMMERCIAL

## 2024-04-05 VITALS
OXYGEN SATURATION: 99 % | HEART RATE: 78 BPM | RESPIRATION RATE: 16 BRPM | DIASTOLIC BLOOD PRESSURE: 62 MMHG | SYSTOLIC BLOOD PRESSURE: 95 MMHG

## 2024-04-05 DIAGNOSIS — C34.91 ADENOCARCINOMA OF RIGHT LUNG (HCC): Primary | ICD-10-CM

## 2024-04-05 PROCEDURE — 96411 CHEMO IV PUSH ADDL DRUG: CPT

## 2024-04-05 PROCEDURE — 96375 TX/PRO/DX INJ NEW DRUG ADDON: CPT

## 2024-04-05 PROCEDURE — 96361 HYDRATE IV INFUSION ADD-ON: CPT

## 2024-04-05 PROCEDURE — 96366 THER/PROPH/DIAG IV INF ADDON: CPT

## 2024-04-05 PROCEDURE — 96413 CHEMO IV INFUSION 1 HR: CPT

## 2024-04-05 PROCEDURE — 96367 TX/PROPH/DG ADDL SEQ IV INF: CPT

## 2024-04-05 RX ORDER — SODIUM CHLORIDE 9 MG/ML
1000 INJECTION, SOLUTION INTRAVENOUS ONCE
Status: COMPLETED | OUTPATIENT
Start: 2024-04-05 | End: 2024-04-05

## 2024-04-05 RX ADMIN — SODIUM CHLORIDE 1000 ML: 9 INJECTION, SOLUTION INTRAVENOUS at 13:09:00

## 2024-04-05 NOTE — PROGRESS NOTES
Pt here for C3D1 Alimta/Cisplatin.  Arrives Ambulating independently, accompanied by Self     Patient was evaluated today by Treatment Nurse.    Oral medications included in this regimen:  no    Patient confirms comprehension of cancer treatment schedule:  yes    Pregnancy screening:  Not applicable    Modifications in dose or schedule:  No    Medications appearance and physical integrity checked by RN: yes.    Chemotherapy IV pump settings verified by 2 RNs:  Yes.  Frequency of blood return and site check throughout administration: Prior to administration, Prior to each drug, and At completion of therapy     Infusion/treatment outcome:  patient tolerated treatment without incident    Education Record    Learner:  Patient  Barriers / Limitations:  None  Method:  Reinforcement  Education / instructions given:  Reinforced plan of care  Outcome:  Shows understanding    Discharged Home, Ambulating independently, accompanied by:Self    Patient/family verbalized understanding of future appointments: by [employer] messaging

## 2024-04-25 ENCOUNTER — OFFICE VISIT (OUTPATIENT)
Dept: HEMATOLOGY/ONCOLOGY | Facility: HOSPITAL | Age: 54
End: 2024-04-25
Attending: THORACIC SURGERY (CARDIOTHORACIC VASCULAR SURGERY)
Payer: COMMERCIAL

## 2024-04-25 VITALS
SYSTOLIC BLOOD PRESSURE: 128 MMHG | DIASTOLIC BLOOD PRESSURE: 78 MMHG | HEART RATE: 77 BPM | OXYGEN SATURATION: 99 % | TEMPERATURE: 99 F | HEIGHT: 70 IN | BODY MASS INDEX: 28.77 KG/M2 | WEIGHT: 201 LBS | RESPIRATION RATE: 16 BRPM

## 2024-04-25 DIAGNOSIS — C34.91 ADENOCARCINOMA OF RIGHT LUNG (HCC): Primary | ICD-10-CM

## 2024-04-25 DIAGNOSIS — D75.839 THROMBOCYTOSIS: ICD-10-CM

## 2024-04-25 DIAGNOSIS — Z51.11 CHEMOTHERAPY MANAGEMENT, ENCOUNTER FOR: ICD-10-CM

## 2024-04-25 LAB
ALBUMIN SERPL-MCNC: 4.5 G/DL (ref 3.2–4.8)
ALBUMIN/GLOB SERPL: 1.6 {RATIO} (ref 1–2)
ALP LIVER SERPL-CCNC: 80 U/L
ALT SERPL-CCNC: 20 U/L
ANION GAP SERPL CALC-SCNC: 7 MMOL/L (ref 0–18)
AST SERPL-CCNC: 19 U/L (ref ?–34)
BASOPHILS # BLD AUTO: 0.04 X10(3) UL (ref 0–0.2)
BASOPHILS NFR BLD AUTO: 0.7 %
BILIRUB SERPL-MCNC: 0.4 MG/DL (ref 0.3–1.2)
BUN BLD-MCNC: 8 MG/DL (ref 9–23)
BUN/CREAT SERPL: 9.6 (ref 10–20)
CALCIUM BLD-MCNC: 10 MG/DL (ref 8.7–10.4)
CHLORIDE SERPL-SCNC: 104 MMOL/L (ref 98–112)
CO2 SERPL-SCNC: 27 MMOL/L (ref 21–32)
CREAT BLD-MCNC: 0.83 MG/DL
DEPRECATED RDW RBC AUTO: 50.9 FL (ref 35.1–46.3)
EGFRCR SERPLBLD CKD-EPI 2021: 84 ML/MIN/1.73M2 (ref 60–?)
EOSINOPHIL # BLD AUTO: 0.1 X10(3) UL (ref 0–0.7)
EOSINOPHIL NFR BLD AUTO: 1.9 %
ERYTHROCYTE [DISTWIDTH] IN BLOOD BY AUTOMATED COUNT: 14.2 % (ref 11–15)
GLOBULIN PLAS-MCNC: 2.8 G/DL (ref 2.8–4.4)
GLUCOSE BLD-MCNC: 96 MG/DL (ref 70–99)
HCT VFR BLD AUTO: 35.4 %
HGB BLD-MCNC: 11.7 G/DL
IMM GRANULOCYTES # BLD AUTO: 0.02 X10(3) UL (ref 0–1)
IMM GRANULOCYTES NFR BLD: 0.4 %
LYMPHOCYTES # BLD AUTO: 2.03 X10(3) UL (ref 1–4)
LYMPHOCYTES NFR BLD AUTO: 38 %
MCH RBC QN AUTO: 32.6 PG (ref 26–34)
MCHC RBC AUTO-ENTMCNC: 33.1 G/DL (ref 31–37)
MCV RBC AUTO: 98.6 FL
MONOCYTES # BLD AUTO: 0.64 X10(3) UL (ref 0.1–1)
MONOCYTES NFR BLD AUTO: 12 %
NEUTROPHILS # BLD AUTO: 2.51 X10 (3) UL (ref 1.5–7.7)
NEUTROPHILS # BLD AUTO: 2.51 X10(3) UL (ref 1.5–7.7)
NEUTROPHILS NFR BLD AUTO: 47 %
OSMOLALITY SERPL CALC.SUM OF ELEC: 284 MOSM/KG (ref 275–295)
PLATELET # BLD AUTO: 317 10(3)UL (ref 150–450)
POTASSIUM SERPL-SCNC: 4.9 MMOL/L (ref 3.5–5.1)
PROT SERPL-MCNC: 7.3 G/DL (ref 5.7–8.2)
RBC # BLD AUTO: 3.59 X10(6)UL
SODIUM SERPL-SCNC: 138 MMOL/L (ref 136–145)
WBC # BLD AUTO: 5.3 X10(3) UL (ref 4–11)

## 2024-04-25 PROCEDURE — 80053 COMPREHEN METABOLIC PANEL: CPT

## 2024-04-25 PROCEDURE — 36415 COLL VENOUS BLD VENIPUNCTURE: CPT

## 2024-04-25 PROCEDURE — 85025 COMPLETE CBC W/AUTO DIFF WBC: CPT

## 2024-04-25 PROCEDURE — 99215 OFFICE O/P EST HI 40 MIN: CPT | Performed by: INTERNAL MEDICINE

## 2024-04-25 RX ORDER — SODIUM CHLORIDE 9 MG/ML
1000 INJECTION, SOLUTION INTRAVENOUS ONCE
Status: CANCELLED | OUTPATIENT
Start: 2024-04-25

## 2024-04-25 RX ORDER — CYANOCOBALAMIN 1000 UG/ML
1000 INJECTION, SOLUTION INTRAMUSCULAR; SUBCUTANEOUS DAILY
Status: CANCELLED | OUTPATIENT
Start: 2024-04-25

## 2024-04-25 NOTE — PROGRESS NOTES
Oncology note    Lung Cancer     Requesting: Dr. Rivera    HPI:  54 year old  With right upper lobe adenocarcinoma s/p  right upper lobectomy MLND pT2N2 24    EGFR, ALK negative, PDL1 40%    Initiated adjuvant Cisplatin Pemetrexed 24.         Interval History:  Returns for consideration of last cycle of Cisplatin Pemetrexed. Feeling relatively well. More prolonged fatigue but functional.  No fevers/chills. No changes in breathing. No changes in bowel or bladder habits. Nausea controlled. No CIPN or focal neurological deficits. No active bleeding or skin rash. Oral intake is adequate.           Past Medical History:    Adenocarcinoma of lung, right (HCC)    T2aN2    Cancer (HCC)    H/O: hysterectomy    Shortness of breath     Social History     Socioeconomic History    Marital status:    Tobacco Use    Smoking status: Former     Current packs/day: 0.00     Types: Cigarettes     Quit date: 2024     Years since quittin.2    Smokeless tobacco: Never   Vaping Use    Vaping status: Never Used   Substance and Sexual Activity    Alcohol use: Not Currently    Drug use: Not Currently     Lung cancer in father    Current Outpatient Medications on File Prior to Visit   Medication Sig Dispense Refill    prochlorperazine (COMPAZINE) 10 mg tablet Take 1 tablet (10 mg total) by mouth every 6 (six) hours as needed for Nausea. 30 tablet 3    ondansetron (ZOFRAN) 8 MG tablet Take 1 tablet (8 mg total) by mouth every 8 (eight) hours as needed for Nausea. 30 tablet 3    folic acid 1 MG Oral Tab Take 1 tablet (1 mg total) by mouth daily. 30 tablet 5    dexamethasone (DECADRON) 4 MG tablet Take 1 tablet (4 mg total) by mouth 2 (two) times daily. Take twice a day for 2 days, starting day after chemotherapy (days 2 and 3) for each cycle. 24 tablet 0    oxyCODONE 5 MG Oral Tab Take 1 tablet (5 mg total) by mouth every 4 (four) hours as needed for Pain. 30 tablet 0    albuterol 108 (90 Base) MCG/ACT Inhalation Aero  Soln Inhale 2 puffs into the lungs every 6 (six) hours as needed for Wheezing. 8.5 g 5    meclizine 25 MG Oral Tab Take 1 tablet (25 mg total) by mouth 3 (three) times daily as needed. 21 tablet 0     Current Facility-Administered Medications on File Prior to Visit   Medication Dose Route Frequency Provider Last Rate Last Admin    [COMPLETED] fosaprepitant (Emend) 150 mg in sodium chloride 0.9% 150 mL IVPB  150 mg Intravenous Once Bettye Rich APRN   Stopped at 04/05/24 0930    [COMPLETED] ondansetron (Zofran) 16 mg, dexAMETHasone (Decadron) 12 mg in sodium chloride 0.9% 109.2 mL IVPB   Intravenous Once Bettye Rich APRN   Stopped at 04/05/24 0903    [COMPLETED] PEMEtrexed (Alimta) 1,025 mg in sodium chloride 0.9% 141 mL infusion  500 mg/m2 (Treatment Plan Recorded) Intravenous Once Bettye Rich APRN   Stopped at 04/05/24 0947    [COMPLETED] potassium chloride 20 mEq, magnesium sulfate 4 g in sodium chloride 0.9% 1,018 mL IVPB (Onc)   Intravenous Once Bettye Rich APRN   Stopped at 04/05/24 1153    [COMPLETED] CISplatin (Platinol) 75 mg/m2 = 155 mg in sodium chloride 0.9% 405 mL infusion  75 mg/m2 (Treatment Plan Recorded) Intravenous Once Bettye Rich APRN   Stopped at 04/05/24 1303    [COMPLETED] sodium chloride 0.9% infusion 1,000 mL  1,000 mL Intravenous Once Bettye Rich APRN   Stopped at 04/05/24 1432     Allergies   Allergen Reactions    Singulair [Montelukast] SWELLING    Pollen OTHER (SEE COMMENTS)     Nasal congestion, runny nose     Vitals:    04/25/24 1412   BP: 128/78   Pulse: 77   Resp: 16   Temp: 98.5 °F (36.9 °C)       Nad, rr, nd, no edema, lozoya, no deformity, nl mood, nl skin      A/P:  54 year old  With right upper lobe adenocarcinoma s/p  right upper lobectomy MLND pT2N2 Stage IIIA 2/8/24.  --EGFR and ALK neg.  PDL1 40%  --Plan for cisplatin pemetrexed, followed by pembrolizumab with PDL1 40%.  Initiated Cisplatin Pemetrexed 2/23/24. Ok to treat with  Cycle #4 Cisplatin Pemetrexed.   RTC ~4 weeks for consideration of Pembrolizumab start/ed/consent and lab check.  Imaging planned for 6 mo post-surgery or prn (~end of 7/2024)    -Thrombocytosis reactive. Monitor.  -chemo induced anemia mild. Monitor.    -nausea. Supportive care.monitor.      MDM High r/t adenocarcinoma of lung on active chemo tx.  HIPOLITO Abarca

## 2024-04-26 ENCOUNTER — OFFICE VISIT (OUTPATIENT)
Dept: HEMATOLOGY/ONCOLOGY | Facility: HOSPITAL | Age: 54
End: 2024-04-26
Attending: THORACIC SURGERY (CARDIOTHORACIC VASCULAR SURGERY)
Payer: COMMERCIAL

## 2024-04-26 VITALS
TEMPERATURE: 98 F | DIASTOLIC BLOOD PRESSURE: 55 MMHG | RESPIRATION RATE: 16 BRPM | WEIGHT: 202.88 LBS | SYSTOLIC BLOOD PRESSURE: 113 MMHG | OXYGEN SATURATION: 99 % | BODY MASS INDEX: 29 KG/M2 | HEART RATE: 80 BPM

## 2024-04-26 DIAGNOSIS — C34.91 ADENOCARCINOMA OF RIGHT LUNG (HCC): Primary | ICD-10-CM

## 2024-04-26 PROCEDURE — 96372 THER/PROPH/DIAG INJ SC/IM: CPT

## 2024-04-26 PROCEDURE — 96413 CHEMO IV INFUSION 1 HR: CPT

## 2024-04-26 PROCEDURE — 96366 THER/PROPH/DIAG IV INF ADDON: CPT

## 2024-04-26 PROCEDURE — 96367 TX/PROPH/DG ADDL SEQ IV INF: CPT

## 2024-04-26 PROCEDURE — 96411 CHEMO IV PUSH ADDL DRUG: CPT

## 2024-04-26 PROCEDURE — 96361 HYDRATE IV INFUSION ADD-ON: CPT

## 2024-04-26 RX ORDER — CYANOCOBALAMIN 1000 UG/ML
1000 INJECTION, SOLUTION INTRAMUSCULAR; SUBCUTANEOUS ONCE
Status: COMPLETED | OUTPATIENT
Start: 2024-04-26 | End: 2024-04-26

## 2024-04-26 RX ORDER — SODIUM CHLORIDE 9 MG/ML
1000 INJECTION, SOLUTION INTRAVENOUS ONCE
Status: COMPLETED | OUTPATIENT
Start: 2024-04-26 | End: 2024-04-26

## 2024-04-26 RX ORDER — CYANOCOBALAMIN 1000 UG/ML
INJECTION, SOLUTION INTRAMUSCULAR; SUBCUTANEOUS
Status: DISPENSED
Start: 2024-04-26 | End: 2024-04-26

## 2024-04-26 RX ADMIN — CYANOCOBALAMIN 1000 MCG: 1000 INJECTION, SOLUTION INTRAMUSCULAR; SUBCUTANEOUS at 10:31:00

## 2024-04-26 RX ADMIN — SODIUM CHLORIDE 1000 ML: 9 INJECTION, SOLUTION INTRAVENOUS at 14:53:00

## 2024-04-26 NOTE — PROGRESS NOTES
Pt here for C4D1 Drug(s)Alimta & Cisplatin & Vit B12 injection.  Arrives Ambulating independently, accompanied by Self.  Feels well, without complaints.  PIV started with good blood return.      Patient was evaluated today by Treatment Nurse.    Oral medications included in this regimen:  no    Patient confirms comprehension of cancer treatment schedule:  yes    Pregnancy screening:  Denies possibility of pregnancy    Modifications in dose or schedule:  No    Medications appearance and physical integrity checked by RN: yes.    Chemotherapy IV pump settings verified by 2 RNs:  Yes.  Frequency of blood return and site check throughout administration: Prior to administration, Prior to each drug, and At completion of therapy     Infusion/treatment outcome:  patient tolerated treatment without incident    PIV dc'd and wrapped in coban.  Back in 4 weeks for lab/MDV for possible keytruda.     Education Record    Learner:  Patient  Barriers / Limitations:  None  Method:  Discussion  Education / instructions given:  Reviewed plan of care  Outcome:  Shows understanding    Discharged Home, Ambulating independently, accompanied by:Self    Patient/family verbalized understanding of future appointments: by [employer] messaging

## 2024-04-26 NOTE — PROGRESS NOTES
Agree with dian Rich note.  continue with next dose of cisplatin pemetrexed adjuvant therapy for right lung cancer.  Given PD-L1 expression we will plan for adjuvant pembrolizumab following chemotherapy.  Patient is tolerating treatment well exam comfortable normal respirations no lad thrombocytosis likely reactive monitor

## 2024-05-22 ENCOUNTER — TELEPHONE (OUTPATIENT)
Dept: HEMATOLOGY/ONCOLOGY | Facility: HOSPITAL | Age: 54
End: 2024-05-22

## 2024-05-22 NOTE — TELEPHONE ENCOUNTER
Called Emy to discuss with her that Dr. Mccurdy is no longer here at [facility] and would like to set her up with new provider prior to her starting Keytruda. Discussed that a provider has to be the one that orders a new treatment plan. Offered her a time of 2:30 with Dr. Maier. She is not able to make that time. Called her back with a 730 opening or Tuesday at 830. Those times do not work for her. She is very frustrated and upset she was not called last week to get this arranged as her work schedule has already been worked around this appointment time. I told her I would have a member of the management team get back to her with a plan for her.

## 2024-05-23 ENCOUNTER — TELEPHONE (OUTPATIENT)
Dept: HEMATOLOGY/ONCOLOGY | Facility: HOSPITAL | Age: 54
End: 2024-05-23

## 2024-05-24 ENCOUNTER — NURSE ONLY (OUTPATIENT)
Dept: HEMATOLOGY/ONCOLOGY | Facility: HOSPITAL | Age: 54
End: 2024-05-24
Attending: THORACIC SURGERY (CARDIOTHORACIC VASCULAR SURGERY)

## 2024-05-24 VITALS
BODY MASS INDEX: 29.2 KG/M2 | HEIGHT: 70 IN | RESPIRATION RATE: 16 BRPM | OXYGEN SATURATION: 99 % | DIASTOLIC BLOOD PRESSURE: 58 MMHG | WEIGHT: 204 LBS | HEART RATE: 77 BPM | SYSTOLIC BLOOD PRESSURE: 114 MMHG | TEMPERATURE: 98 F

## 2024-05-24 DIAGNOSIS — Z09 CHEMOTHERAPY FOLLOW-UP EXAMINATION: ICD-10-CM

## 2024-05-24 DIAGNOSIS — C34.11 PRIMARY CANCER OF RIGHT UPPER LOBE OF LUNG (HCC): Primary | ICD-10-CM

## 2024-05-24 DIAGNOSIS — C34.91 ADENOCARCINOMA OF RIGHT LUNG (HCC): ICD-10-CM

## 2024-05-24 DIAGNOSIS — Z51.11 CHEMOTHERAPY MANAGEMENT, ENCOUNTER FOR: ICD-10-CM

## 2024-05-24 LAB
ALBUMIN SERPL-MCNC: 4.5 G/DL (ref 3.2–4.8)
ALBUMIN/GLOB SERPL: 1.9 {RATIO} (ref 1–2)
ALP LIVER SERPL-CCNC: 69 U/L
ALT SERPL-CCNC: 23 U/L
ANION GAP SERPL CALC-SCNC: 6 MMOL/L (ref 0–18)
AST SERPL-CCNC: 25 U/L (ref ?–34)
BASOPHILS # BLD AUTO: 0.06 X10(3) UL (ref 0–0.2)
BASOPHILS NFR BLD AUTO: 0.9 %
BILIRUB SERPL-MCNC: 0.4 MG/DL (ref 0.3–1.2)
BUN BLD-MCNC: 12 MG/DL (ref 9–23)
BUN/CREAT SERPL: 13.2 (ref 10–20)
CALCIUM BLD-MCNC: 9.8 MG/DL (ref 8.7–10.4)
CHLORIDE SERPL-SCNC: 108 MMOL/L (ref 98–112)
CO2 SERPL-SCNC: 28 MMOL/L (ref 21–32)
CREAT BLD-MCNC: 0.91 MG/DL
DEPRECATED RDW RBC AUTO: 56 FL (ref 35.1–46.3)
EGFRCR SERPLBLD CKD-EPI 2021: 75 ML/MIN/1.73M2 (ref 60–?)
EOSINOPHIL # BLD AUTO: 0.04 X10(3) UL (ref 0–0.7)
EOSINOPHIL NFR BLD AUTO: 0.6 %
ERYTHROCYTE [DISTWIDTH] IN BLOOD BY AUTOMATED COUNT: 15.2 % (ref 11–15)
GLOBULIN PLAS-MCNC: 2.4 G/DL (ref 2–3.5)
GLUCOSE BLD-MCNC: 90 MG/DL (ref 70–99)
HCT VFR BLD AUTO: 34.4 %
HGB BLD-MCNC: 11.1 G/DL
IMM GRANULOCYTES # BLD AUTO: 0.03 X10(3) UL (ref 0–1)
IMM GRANULOCYTES NFR BLD: 0.5 %
LYMPHOCYTES # BLD AUTO: 1.98 X10(3) UL (ref 1–4)
LYMPHOCYTES NFR BLD AUTO: 31.1 %
MCH RBC QN AUTO: 32.4 PG (ref 26–34)
MCHC RBC AUTO-ENTMCNC: 32.3 G/DL (ref 31–37)
MCV RBC AUTO: 100.3 FL
MONOCYTES # BLD AUTO: 0.78 X10(3) UL (ref 0.1–1)
MONOCYTES NFR BLD AUTO: 12.2 %
NEUTROPHILS # BLD AUTO: 3.48 X10 (3) UL (ref 1.5–7.7)
NEUTROPHILS # BLD AUTO: 3.48 X10(3) UL (ref 1.5–7.7)
NEUTROPHILS NFR BLD AUTO: 54.7 %
OSMOLALITY SERPL CALC.SUM OF ELEC: 293 MOSM/KG (ref 275–295)
PLATELET # BLD AUTO: 236 10(3)UL (ref 150–450)
POTASSIUM SERPL-SCNC: 5 MMOL/L (ref 3.5–5.1)
PROT SERPL-MCNC: 6.9 G/DL (ref 5.7–8.2)
RBC # BLD AUTO: 3.43 X10(6)UL
SODIUM SERPL-SCNC: 142 MMOL/L (ref 136–145)
TSI SER-ACNC: 2.02 MIU/ML (ref 0.55–4.78)
WBC # BLD AUTO: 6.4 X10(3) UL (ref 4–11)

## 2024-05-24 PROCEDURE — 99215 OFFICE O/P EST HI 40 MIN: CPT | Performed by: INTERNAL MEDICINE

## 2024-05-24 PROCEDURE — 85025 COMPLETE CBC W/AUTO DIFF WBC: CPT

## 2024-05-24 PROCEDURE — 80053 COMPREHEN METABOLIC PANEL: CPT

## 2024-05-24 PROCEDURE — 36415 COLL VENOUS BLD VENIPUNCTURE: CPT

## 2024-05-24 PROCEDURE — 84443 ASSAY THYROID STIM HORMONE: CPT

## 2024-05-24 NOTE — PROGRESS NOTES
HPI   Emy Gibson is a 54 year old female here for follow up of Primary cancer of right upper lobe of lung (HCC)    Chemotherapy follow-up examination.    She s/p of cycle 4 of pemetrexed/cisplatin on 4/26/24.    Fatigue:  Yes, states worse with  more cycles.  Now improving.     Fevers:  No    Appetite/taste changes:  Yes, taste and appetite changes    Mucositis:  No    Weight changes:  Yes, gained.    Nausea/vomiting:  Yes, nausea only on/off, used antiemetics.     Diarrhea:  No    Constipation:  No    Peripheral neuropathy:  No    Numbness in the R axilla and in the upper chest.  Pain since surgery.  Stabbing pain with numbness towards breast and axilla as pokes with a hot fork, chest feels like a knife.  If rubs or moves feels better.      ECOG PS 1    Oncology History   Primary cancer of right upper lobe of lung (HCC)   2/14/2024 Cancer Staged    Staging form: Lung, AJCC 8th Edition  - Pathologic stage from 2/14/2024: Stage IIIA (pT2a, pN2, cM0)     2/15/2024 Initial Diagnosis    Adenocarcinoma of right lung (HCC)     2/23/2024 -  Chemotherapy    OP Pemetrexed / CISplatin  Plan Provider: Igor Mccurdy MD  Treatment goal: Curative  Line of treatment: [No plan line of treatment]         Review of Systems:     Review of Systems   HENT:   Positive for tinnitus (has gotten worse). Negative for hearing loss.    Respiratory:  Positive for shortness of breath (HANNON when running around the house, up and down the stairs or shopping). Negative for cough.    Cardiovascular:  Positive for chest pain (post thoracotomy) and leg swelling (at the end of the day on the R foot and pain with dorsiflexion.  Pain on the foot getting out of bed, better flexing it.).   Gastrointestinal:  Negative for abdominal pain.   Genitourinary:  Negative for dysuria and frequency.    Musculoskeletal:  Positive for arthralgias (R hip with walking chronic) and back pain (when laying down has pain on the LLE that is lateral and burning, states  prior to dx of ca.). Negative for neck pain.   Skin:  Negative for itching and rash.   Neurological:  Positive for dizziness (if gets up too fast.). Negative for headaches.   Hematological:  Negative for adenopathy. Does not bruise/bleed easily.   Psychiatric/Behavioral:  Positive for sleep disturbance.          Current Outpatient Medications   Medication Sig Dispense Refill    prochlorperazine (COMPAZINE) 10 mg tablet Take 1 tablet (10 mg total) by mouth every 6 (six) hours as needed for Nausea. 30 tablet 3    ondansetron (ZOFRAN) 8 MG tablet Take 1 tablet (8 mg total) by mouth every 8 (eight) hours as needed for Nausea. 30 tablet 3    folic acid 1 MG Oral Tab Take 1 tablet (1 mg total) by mouth daily. 30 tablet 5    dexamethasone (DECADRON) 4 MG tablet Take 1 tablet (4 mg total) by mouth 2 (two) times daily. Take twice a day for 2 days, starting day after chemotherapy (days 2 and 3) for each cycle. 24 tablet 0    oxyCODONE 5 MG Oral Tab Take 1 tablet (5 mg total) by mouth every 4 (four) hours as needed for Pain. 30 tablet 0    albuterol 108 (90 Base) MCG/ACT Inhalation Aero Soln Inhale 2 puffs into the lungs every 6 (six) hours as needed for Wheezing. 8.5 g 5    meclizine 25 MG Oral Tab Take 1 tablet (25 mg total) by mouth 3 (three) times daily as needed. 21 tablet 0     Allergies:   Allergies   Allergen Reactions    Singulair [Montelukast] SWELLING    Pollen OTHER (SEE COMMENTS)     Nasal congestion, runny nose       Past Medical History:    Adenocarcinoma of lung, right (HCC)    T2aN2    Cancer (HCC)    H/O: hysterectomy    Shortness of breath     Past Surgical History:   Procedure Laterality Date    Hysteroscopy      Lung lobectomy Right 02/08/2024    VATS upper lobectomy by Dr. Fong    Tonsillectomy       Social History     Socioeconomic History    Marital status:      Spouse name: Not on file    Number of children: Not on file    Years of education: Not on file    Highest education level: Not on  file   Occupational History    Not on file   Tobacco Use    Smoking status: Former     Current packs/day: 0.00     Types: Cigarettes     Quit date: 2024     Years since quittin.3    Smokeless tobacco: Never   Vaping Use    Vaping status: Never Used   Substance and Sexual Activity    Alcohol use: Not Currently    Drug use: Not Currently    Sexual activity: Not on file   Other Topics Concern    Not on file   Social History Narrative    Not on file     Social Determinants of Health     Financial Resource Strain: Not on file   Food Insecurity: No Food Insecurity (2024)    Food Insecurity     Food Insecurity: Never true   Transportation Needs: No Transportation Needs (2024)    Transportation Needs     Lack of Transportation: No   Physical Activity: Not on file   Stress: Not on file   Social Connections: Not on file   Housing Stability: Low Risk  (2024)    Housing Stability     Housing Instability: No     Housing Instability Emergency: Not on file     Crib or Bassinette: Not on file     Family History   Problem Relation Age of Onset    Hypertension Mother     Cancer Mother     Lung Disorder Mother     Cancer Paternal Grandfather          PHYSICAL EXAM:    /58 (BP Location: Left arm, Patient Position: Sitting, Cuff Size: adult)   Pulse 77   Temp 98.4 °F (36.9 °C) (Oral)   Resp 16   Ht 1.778 m (5' 10\")   Wt 92.5 kg (204 lb)   SpO2 99%   BMI 29.27 kg/m²   Wt Readings from Last 6 Encounters:   24 92.5 kg (204 lb)   24 92 kg (202 lb 14.4 oz)   24 91.2 kg (201 lb)   24 90.3 kg (199 lb)   03/15/24 90.6 kg (199 lb 11.2 oz)   24 90.3 kg (199 lb 1.6 oz)     Physical Exam  General: Patient is alert, not in acute distress.  HEENT: EOMs intact. PERRL. Oropharynx is clear.   Neck: No JVD. No palpable lymphadenopathy. Neck is supple.  Chest: Clear to auscultation.  Heart: Regular rate and rhythm.   Abdomen: Soft, non tender with good bowel sounds.  Extremities: No  edema.  Neurological: Grossly intact.   Lymphatics: There is no palpable lymphadenopathy throughout in the cervical, supraclavicular, axillary, or inguinal regions.  Psych/Depression: nl        ASSESSMENT/PLAN:     1. Primary cancer of right upper lobe of lung (HCC)    2. Chemotherapy follow-up examination       Cancer Staging   Primary cancer of right upper lobe of lung (HCC)  Staging form: Lung, AJCC 8th Edition  - Pathologic stage from 2/14/2024: Stage IIIA (pT2a, pN2, cM0) - Signed by Stanislaw Maier MD on 5/24/2024    Patient initially diagnosed with invasive adenocarcinoma of the right upper lobe in December 2023.  She then underwent a right upper lobectomy with mediastinal lymph node dissection on 2/14/2024.  Staging as above.  This was ALK and EGFR negative and PD-L1 40%.    Patient has completed 4 cycles of adjuvant chemotherapy with cisplatin and pembrolizumab, course of treatment from 2/23/2024 through 4/26/2024.    Since the patient is PD-L1 positive, we will proceed with adjuvant pembrolizumab 400 mg every 6 weeks for up to 1 year.  Briefly discussed potential side effects of immune checkpoint inhibitors, and APRN will review with patient prior to initiation of treatment.    Discussed with the patient NCCN guidelines for surveillance which recommend H&P and chest CT ± contrast every 3-6 mo  for 3 y, then H&P and chest CT ± contrast every 6 mo for 2 y, then H&P and a low-dose non-contrast-enhanced chest CT annually.    Given the patient has completed her course of chemotherapy, will be now on adjuvant immune checkpoint inhibitor for a year, will be initiating surveillance that will overlap with the first year of the immune checkpoint immunotherapy.  Patient's last chest imaging was in December 2023.  Discussed with patient that we will proceed with a CT scan of the chest with contrast in August 2024 as 6 months, and we will then repeat in 3 to 6 months, as recommended above.    Once patient completes her  course of adjuvant treatment, she will be referred to our survivorship clinic.    The patient is advised to begin or continue progressive daily aerobic exercise program, follow a low fat, low cholesterol diet, attempt to loose weight, decrease or avoid alcohol intake, have regular screening exams as age/gender appropriate, reduce salt in the diet and cooking, reduce exposure to stress, improve dietary compliance and continue current medications.  Information for the [facility] and programs to support the above recommendations was provided to the patient.     Patient does not have a primary care doctor.  I discussed with patient the importance of establishing care with a primary care doctor for routine medical care, screening and prevention.  Patient will be provided information about her primary care doctors that are located close to her home.    MDM high risk.  No orders of the defined types were placed in this encounter.      Results From Past 48 Hours:  Recent Results (from the past 48 hour(s))   CBC W/ DIFFERENTIAL    Collection Time: 05/24/24 10:58 AM   Result Value Ref Range    WBC 6.4 4.0 - 11.0 x10(3) uL    RBC 3.43 (L) 3.80 - 5.30 x10(6)uL    HGB 11.1 (L) 12.0 - 16.0 g/dL    HCT 34.4 (L) 35.0 - 48.0 %    .3 (H) 80.0 - 100.0 fL    MCH 32.4 26.0 - 34.0 pg    MCHC 32.3 31.0 - 37.0 g/dL    RDW-SD 56.0 (H) 35.1 - 46.3 fL    RDW 15.2 (H) 11.0 - 15.0 %    .0 150.0 - 450.0 10(3)uL    Neutrophil Absolute Prelim 3.48 1.50 - 7.70 x10 (3) uL    Neutrophil Absolute 3.48 1.50 - 7.70 x10(3) uL    Lymphocyte Absolute 1.98 1.00 - 4.00 x10(3) uL    Monocyte Absolute 0.78 0.10 - 1.00 x10(3) uL    Eosinophil Absolute 0.04 0.00 - 0.70 x10(3) uL    Basophil Absolute 0.06 0.00 - 0.20 x10(3) uL    Immature Granulocyte Absolute 0.03 0.00 - 1.00 x10(3) uL    Neutrophil % 54.7 %    Lymphocyte % 31.1 %    Monocyte % 12.2 %    Eosinophil % 0.6 %    Basophil % 0.9 %    Immature Granulocyte % 0.5 %       Imaging &  Referrals:  CT CHEST(CONTRAST ONLY) (CPT=71260)   No orders of the defined types were placed in this encounter.

## 2024-05-28 NOTE — PATIENT INSTRUCTIONS
Medication Education Record: IV Therapy    Learner:  Patient    Barriers / Limitations:  None    Psychosocial Assessment:  patient psychosocial response appropriate    Diagnosis:   Lung cancer     IV Cancer Treatment Name(s): pembrolizumab   IV Cancer Treatment Frequency -every 6 weeks     Number of cycles planned - 1 year   Plan for appointments and lab testing - prior to each dose  Verified Consent to Chemotherapy/Biotherapy Cancer Treatment form signed by patient and provider:  Yes    Confirm patient informs his/her Cancer Care team of any treatment received in a setting other than at the [facility] (such as inpatient or outpatient at another hospital or clinic, locally or out of state) so that this medical information can accurately be reflected in his/her medical record.  This vital information will provide an accurate picture for the physician prescribing the current cancer treatment.Yes  Cancer Treatment Side Effects (refer to Chemo Care Handouts for further information):  Allergic reactions  Constipation  Diarrhea  Eye disorders  Fatigue  Hair loss  Hormone gland changes involving the thyroid, pituitary, adrenal, and pancreas  Kidney / Bladder effects  Liver effects  Loss of appetite  Low red blood cell count / Anemia  Low White Blood Cell Count/Risk of infection  Low Platelet Count/Risk of Bleeding  Lung Effects  Mouth or Throat Sores  Muscle / Bone Effects  Nausea / Vomiting  Skin Effects  Taste Changes    IV administration risks:  Potential leaking of drug outside of vein during administration   Signs/symptoms include redness, swelling, pain, burning at the site of administration  Notify Infusion Nurse immediately if any of these symptoms occur during or after the infusion  Allergic reaction: there is a chance for allergic reaction with some medications.  If your prescribed therapy has a higher risk for this, steps will be taken to prevent and minimize this from  occurring.    Recommended Anti-nausea medications (as directed by your provider):  Prochloperazine (Compazine) 10mg every 8 hours  Take as needed and Ondansetron (Zofran) 8 mg every 8 hours  Take as needed    Helpful hints during cancer treatment:    Diet:  Avoid greasy or spicy foods on days surrounding chemotherapy  Eat small frequent meals per day (6-7 meals) rather than 3 large meals  Choose high calorie/high protein foods (chicken, hard cooked eggs, peanut butter, cheese)  If nauseated, try dry foods, such as toast, crackers or pretzels; light or bland foods, such as applesauce or oatmeal.    Fluid intake:  Drink 8-10 cups of liquid a day and take a water bottle wherever you go.  Any fluid is acceptable, but caffeinated products do not count towards your intake and should be limited to 1-2 drinks/day.    Physical Activity    If your doctor approves, be as physically active as you can, but start out slowly, and increase your activity over time as you feel stronger.  Listen to your body and rest when you need to.  Do what you can when you feel up to it.      Oral Care  Keep your mouth clean.  Rinse your mouth before and after meals with plain water or with a mild mouth rinse (made with 1 quart water, 1 teaspoon salt, and 1 teaspoon baking soda, shake before using)   Avoid commercial mouthwashes that contain alcohol, alcoholic or acidic drinks or tobacco  An acceptable mouthwash is Biotene®   If soreness or sores develop, contact the office.    Diarrhea or Constipation    Imodium A-D use for diarrhea:  Take 2 tabs (4mg) at the first sign of diarrhea; then take 1 tab (2mg) every 2 hours until you have had no diarrhea for at least 12 hours; during the night take 2 tabs (4mg) every 4 hours as needed.  Maximum of 8 tablets in 24 hours.   Stool softener for constipation   If you have persistent diarrhea or constipation, please contact the triage nurse for further instructions.  Skin Care  Avoid direct sunlight.  Wear a  broad-spectrum sunscreen with an SPF of 30 or higher on any skin exposed to the sun.  Re-apply every 2 hours if in the sun and after bathing or sweating.  For dry skin, use an alcohol-free lotion twice per day, especially after baths.  If scalp hair loss has occurred, protect the scalp from the sun by wearing a hat and use sunscreen.  Apply alcohol-free moisturizer as needed.    When to call the doctor:  Fever of 100.5 or greater or shaking chills  Nausea/vomiting not controlled with anti-nausea medications: Unable to drink for 24 hours or have signs of dehydration: tiredness, thirst, dry mouth, dark and decreased amount of urine  Diarrhea - not controlled with Imodium AD or more than 6 episodes in 24 hours  Constipation -no bowel movement x 3 days, no response to stool softeners or laxatives  Mouth sores, sore throat or blisters on the lips affecting oral intake  Difficulty breathing, chest pain, or new cough  Excessive tiredness or weakness, confusion or loss of balance  New rash  Tingling or burning, redness, swelling of the palms of hands or soles of feet  Sudden new unexpected symptom -such as change in vision, swelling in arm or leg  Increase in numbness and tingling of hands or feet  Unusual bleeding (nose bleeds, blood in urine, stool or phlegm)  Pain with urination  Persistent mood changes, depression, nervousness, difficulty sleeping   Pain, redness, swelling or blistering at the IV site  If you go to Emergency Room for any reason or seek medical attention elsewhere  If you should need to cancel or reschedule any visit, it is important that you contact the office    What Phone Number to Call:  Cancer Center [phone] / Triage Nurse    Teaching Materials Provided:   Chemo Care chemotherapy information sheets  and Safety and Handling Sheet     Please refer to the following link if you are interested in additional information about chemotherapy for yourself or family members:  [email or web address]        Safety and Handling of Chemotherapy  While you or your family member is receiving chemotherapy, whether in the clinic or at home, the following precautions must be taken to lessen any exposure to the medication.     Handling Body Waste:   Caregivers must wear gloves if exposed to the patient’s blood, urine, stool or vomit.  Dispose of the used gloves after each use and wash hands with soap and water.  Any sheets or clothes soiled with the bodily fluids should be machine washed twice in hot water with regular laundry detergent.  Do not wash soiled garments with hands.  If the soiled garments cannot be washed right away, place them in a sealed plastic bag until they can be washed.   Absorbable undergarments, or any other items contaminated with chemotherapy, should be placed in a sealed plastic bag for disposal, separate from other trash.  Toilets should be flushed twice with the lid closed while taking this medication and for 48 hours after the last dose.  Wash your hands after using the toilet.  Wash any skin area that has come in contact with urine or stool.      Safety for my family and friends:  Due to safety concerns and the nature of this treatment environment, children are not permitted in the infusion center.  Please make appropriate arrangements.   Hugging and kissing is safe for you and your partner or family members.  You can visit, sit with, hug and kiss the children in your life.    You can be around pregnant women, though (if possible) they should not clean up any of your body fluids after you have treatment.   Sexual activity is safe while throughout treatment.  It is possible that traces of the oral chemotherapy may be present in vaginal fluid or semen for 48 hours after taking.  A condom should be used during this time.  Effective birth control should be used throughout treatment to prevent pregnancy while on these medications and for several  months or years after therapy.  Chemotherapy can have harmful side effects to the fetus, especially in the first trimester.  In addition, menstrual cycles can become irregular during and after treatment, so you may not know if you are at a time in your cycle when you could become pregnant or if you are actually pregnant.

## 2024-05-29 ENCOUNTER — NURSE ONLY (OUTPATIENT)
Dept: HEMATOLOGY/ONCOLOGY | Facility: HOSPITAL | Age: 54
End: 2024-05-29
Attending: INTERNAL MEDICINE

## 2024-05-29 ENCOUNTER — OFFICE VISIT (OUTPATIENT)
Dept: HEMATOLOGY/ONCOLOGY | Facility: HOSPITAL | Age: 54
End: 2024-05-29
Attending: THORACIC SURGERY (CARDIOTHORACIC VASCULAR SURGERY)

## 2024-05-29 VITALS
TEMPERATURE: 98 F | DIASTOLIC BLOOD PRESSURE: 46 MMHG | RESPIRATION RATE: 16 BRPM | HEART RATE: 72 BPM | WEIGHT: 205.38 LBS | SYSTOLIC BLOOD PRESSURE: 114 MMHG | OXYGEN SATURATION: 100 % | HEIGHT: 70 IN | BODY MASS INDEX: 29.4 KG/M2

## 2024-05-29 DIAGNOSIS — C34.11 PRIMARY CANCER OF RIGHT UPPER LOBE OF LUNG (HCC): Primary | ICD-10-CM

## 2024-05-29 PROCEDURE — 99213 OFFICE O/P EST LOW 20 MIN: CPT | Performed by: NURSE PRACTITIONER

## 2024-05-29 PROCEDURE — 96409 CHEMO IV PUSH SNGL DRUG: CPT

## 2024-05-29 NOTE — PROGRESS NOTES
Medication Education Record: IV Therapy    Learner:  Patient    Barriers / Limitations:  None    Psychosocial Assessment:  patient psychosocial response appropriate    Diagnosis:   Lung cancer     IV Cancer Treatment Name(s): pembrolizumab   IV Cancer Treatment Frequency -every 6 weeks     Number of cycles planned - 1 year   Plan for appointments and lab testing - prior to each dose  Verified Consent to Chemotherapy/Biotherapy Cancer Treatment form signed by patient and provider:  Yes    Confirm patient informs his/her Cancer Care team of any treatment received in a setting other than at the [facility] (such as inpatient or outpatient at another hospital or clinic, locally or out of state) so that this medical information can accurately be reflected in his/her medical record.  This vital information will provide an accurate picture for the physician prescribing the current cancer treatment.Yes  Cancer Treatment Side Effects (refer to Chemo Care Handouts for further information):  Allergic reactions  Constipation  Diarrhea  Eye disorders  Fatigue  Hair loss  Hormone gland changes involving the thyroid, pituitary, adrenal, and pancreas  Kidney / Bladder effects  Liver effects  Loss of appetite  Low red blood cell count / Anemia  Low White Blood Cell Count/Risk of infection  Low Platelet Count/Risk of Bleeding  Lung Effects  Mouth or Throat Sores  Muscle / Bone Effects  Nausea / Vomiting  Skin Effects  Taste Changes    IV administration risks:  Potential leaking of drug outside of vein during administration   Signs/symptoms include redness, swelling, pain, burning at the site of administration  Notify Infusion Nurse immediately if any of these symptoms occur during or after the infusion  Allergic reaction: there is a chance for allergic reaction with some medications.  If your prescribed therapy has a higher risk for this, steps will be taken to prevent and minimize this from  occurring.    Recommended Anti-nausea medications (as directed by your provider):  Prochloperazine (Compazine) 10mg every 8 hours  Take as needed and Ondansetron (Zofran) 8 mg every 8 hours  Take as needed    Helpful hints during cancer treatment:    Diet:  Avoid greasy or spicy foods on days surrounding chemotherapy  Eat small frequent meals per day (6-7 meals) rather than 3 large meals  Choose high calorie/high protein foods (chicken, hard cooked eggs, peanut butter, cheese)  If nauseated, try dry foods, such as toast, crackers or pretzels; light or bland foods, such as applesauce or oatmeal.    Fluid intake:  Drink 8-10 cups of liquid a day and take a water bottle wherever you go.  Any fluid is acceptable, but caffeinated products do not count towards your intake and should be limited to 1-2 drinks/day.    Physical Activity    If your doctor approves, be as physically active as you can, but start out slowly, and increase your activity over time as you feel stronger.  Listen to your body and rest when you need to.  Do what you can when you feel up to it.      Oral Care  Keep your mouth clean.  Rinse your mouth before and after meals with plain water or with a mild mouth rinse (made with 1 quart water, 1 teaspoon salt, and 1 teaspoon baking soda, shake before using)   Avoid commercial mouthwashes that contain alcohol, alcoholic or acidic drinks or tobacco  An acceptable mouthwash is Biotene®   If soreness or sores develop, contact the office.    Diarrhea or Constipation    Imodium A-D use for diarrhea:  Take 2 tabs (4mg) at the first sign of diarrhea; then take 1 tab (2mg) every 2 hours until you have had no diarrhea for at least 12 hours; during the night take 2 tabs (4mg) every 4 hours as needed.  Maximum of 8 tablets in 24 hours.   Stool softener for constipation   If you have persistent diarrhea or constipation, please contact the triage nurse for further instructions.  Skin Care  Avoid direct sunlight.  Wear a  broad-spectrum sunscreen with an SPF of 30 or higher on any skin exposed to the sun.  Re-apply every 2 hours if in the sun and after bathing or sweating.  For dry skin, use an alcohol-free lotion twice per day, especially after baths.  If scalp hair loss has occurred, protect the scalp from the sun by wearing a hat and use sunscreen.  Apply alcohol-free moisturizer as needed.    When to call the doctor:  Fever of 100.5 or greater or shaking chills  Nausea/vomiting not controlled with anti-nausea medications: Unable to drink for 24 hours or have signs of dehydration: tiredness, thirst, dry mouth, dark and decreased amount of urine  Diarrhea - not controlled with Imodium AD or more than 6 episodes in 24 hours  Constipation -no bowel movement x 3 days, no response to stool softeners or laxatives  Mouth sores, sore throat or blisters on the lips affecting oral intake  Difficulty breathing, chest pain, or new cough  Excessive tiredness or weakness, confusion or loss of balance  New rash  Tingling or burning, redness, swelling of the palms of hands or soles of feet  Sudden new unexpected symptom -such as change in vision, swelling in arm or leg  Increase in numbness and tingling of hands or feet  Unusual bleeding (nose bleeds, blood in urine, stool or phlegm)  Pain with urination  Persistent mood changes, depression, nervousness, difficulty sleeping   Pain, redness, swelling or blistering at the IV site  If you go to Emergency Room for any reason or seek medical attention elsewhere  If you should need to cancel or reschedule any visit, it is important that you contact the office    What Phone Number to Call:  Cancer Center [phone] / Triage Nurse    Teaching Materials Provided:   Chemo Care chemotherapy information sheets  and Safety and Handling Sheet     Please refer to the following link if you are interested in additional information about chemotherapy for yourself or family members:  [email or web address]        Safety and Handling of Chemotherapy  While you or your family member is receiving chemotherapy, whether in the clinic or at home, the following precautions must be taken to lessen any exposure to the medication.     Handling Body Waste:   Caregivers must wear gloves if exposed to the patient’s blood, urine, stool or vomit.  Dispose of the used gloves after each use and wash hands with soap and water.  Any sheets or clothes soiled with the bodily fluids should be machine washed twice in hot water with regular laundry detergent.  Do not wash soiled garments with hands.  If the soiled garments cannot be washed right away, place them in a sealed plastic bag until they can be washed.   Absorbable undergarments, or any other items contaminated with chemotherapy, should be placed in a sealed plastic bag for disposal, separate from other trash.  Toilets should be flushed twice with the lid closed while taking this medication and for 48 hours after the last dose.  Wash your hands after using the toilet.  Wash any skin area that has come in contact with urine or stool.      Safety for my family and friends:  Due to safety concerns and the nature of this treatment environment, children are not permitted in the infusion center.  Please make appropriate arrangements.   Hugging and kissing is safe for you and your partner or family members.  You can visit, sit with, hug and kiss the children in your life.    You can be around pregnant women, though (if possible) they should not clean up any of your body fluids after you have treatment.   Sexual activity is safe while throughout treatment.  It is possible that traces of the oral chemotherapy may be present in vaginal fluid or semen for 48 hours after taking.  A condom should be used during this time.  Effective birth control should be used throughout treatment to prevent pregnancy while on these medications and for several  months or years after therapy.  Chemotherapy can have harmful side effects to the fetus, especially in the first trimester.  In addition, menstrual cycles can become irregular during and after treatment, so you may not know if you are at a time in your cycle when you could become pregnant or if you are actually pregnant.      Patient here for chemotherapy education. No c/o pain or discomfort. Previous chemotherapy Acid reflux was worst with complaint. Minimal issues with n/v/c/d. States feeling well today. Reviewed scheduling and potential side effects of pembrolizumab.  Schedule will be q 6 weeks. Pt working full time, exercises on treadmill. Concerned re weight gain during previous treatment - will monitor.  Cancer treatment education, including treatment plan, supportive medications, and post-treatment care was provided to the patient.  The patient/support person  was attentive during education, verbalized understanding, all questions were answered and patient was instructed to call with further questions.     Reinforcement of education is needed at next visit? Yes  Include language provided and  information (if applicable).    Psychosocial concerns or referrals made none currently     This visit lasted 25 minutes with greater than 50% of the visit for discussion of symptom management and treatment.

## 2024-05-29 NOTE — PROGRESS NOTES
Pt here for C1D1 Keytruda.  Arrives Ambulating independently, accompanied by Self     Patient was evaluated today by Treatment Nurse.    Oral medications included in this regimen:  no    Patient confirms comprehension of cancer treatment schedule:  yes    Pregnancy screening:  Not applicable    Modifications in dose or schedule:  No    Medications appearance and physical integrity checked by RN: yes.    Chemotherapy IV pump settings verified by 2 RNs:  No due to targeted therapy IV administration.  Frequency of blood return and site check throughout administration: Prior to administration and At completion of therapy     Infusion/treatment outcome:  patient tolerated treatment without incident    Education Record    Learner:  Patient  Barriers / Limitations:  None  Method:  Discussion  Education / instructions given:  plan of care  Outcome:  Shows understanding    Discharged Home, Ambulating independently, accompanied by:Self    Patient/family verbalized understanding of future appointments: by [employer] messaging

## 2024-05-30 ENCOUNTER — TELEPHONE (OUTPATIENT)
Dept: HEMATOLOGY/ONCOLOGY | Facility: HOSPITAL | Age: 54
End: 2024-05-30

## 2024-05-30 NOTE — TELEPHONE ENCOUNTER
Toxicities: C1 D1 Pembrolizumab on 5/29/2024    Emy reports she feels \"good.\" She feels a little tired today. No other side effects at this time. I encouraged her to please call the office if she is not feeling well or she has any questions or concerns.

## 2024-07-12 ENCOUNTER — NURSE ONLY (OUTPATIENT)
Dept: HEMATOLOGY/ONCOLOGY | Facility: HOSPITAL | Age: 54
End: 2024-07-12
Attending: INTERNAL MEDICINE
Payer: COMMERCIAL

## 2024-07-12 VITALS
HEIGHT: 70 IN | HEART RATE: 87 BPM | OXYGEN SATURATION: 98 % | BODY MASS INDEX: 29.03 KG/M2 | SYSTOLIC BLOOD PRESSURE: 116 MMHG | DIASTOLIC BLOOD PRESSURE: 70 MMHG | TEMPERATURE: 98 F | RESPIRATION RATE: 16 BRPM | WEIGHT: 202.81 LBS

## 2024-07-12 DIAGNOSIS — C34.11 PRIMARY CANCER OF RIGHT UPPER LOBE OF LUNG (HCC): Primary | ICD-10-CM

## 2024-07-12 DIAGNOSIS — Z51.12 ENCOUNTER FOR ANTINEOPLASTIC IMMUNOTHERAPY: ICD-10-CM

## 2024-07-12 LAB
ALBUMIN SERPL-MCNC: 4.5 G/DL (ref 3.2–4.8)
ALBUMIN/GLOB SERPL: 1.7 {RATIO} (ref 1–2)
ALP LIVER SERPL-CCNC: 86 U/L
ALT SERPL-CCNC: 65 U/L
ANION GAP SERPL CALC-SCNC: 8 MMOL/L (ref 0–18)
AST SERPL-CCNC: 47 U/L (ref ?–34)
BASOPHILS # BLD AUTO: 0.04 X10(3) UL (ref 0–0.2)
BASOPHILS NFR BLD AUTO: 0.8 %
BILIRUB SERPL-MCNC: 0.5 MG/DL (ref 0.3–1.2)
BUN BLD-MCNC: 8 MG/DL (ref 9–23)
BUN/CREAT SERPL: 9.3 (ref 10–20)
CALCIUM BLD-MCNC: 9.8 MG/DL (ref 8.7–10.4)
CHLORIDE SERPL-SCNC: 110 MMOL/L (ref 98–112)
CO2 SERPL-SCNC: 24 MMOL/L (ref 21–32)
CREAT BLD-MCNC: 0.86 MG/DL
DEPRECATED RDW RBC AUTO: 39.8 FL (ref 35.1–46.3)
EGFRCR SERPLBLD CKD-EPI 2021: 80 ML/MIN/1.73M2 (ref 60–?)
EOSINOPHIL # BLD AUTO: 0.07 X10(3) UL (ref 0–0.7)
EOSINOPHIL NFR BLD AUTO: 1.4 %
ERYTHROCYTE [DISTWIDTH] IN BLOOD BY AUTOMATED COUNT: 11.6 % (ref 11–15)
GLOBULIN PLAS-MCNC: 2.7 G/DL (ref 2–3.5)
GLUCOSE BLD-MCNC: 95 MG/DL (ref 70–99)
HCT VFR BLD AUTO: 35 %
HGB BLD-MCNC: 12.1 G/DL
IMM GRANULOCYTES # BLD AUTO: 0.01 X10(3) UL (ref 0–1)
IMM GRANULOCYTES NFR BLD: 0.2 %
LYMPHOCYTES # BLD AUTO: 1.37 X10(3) UL (ref 1–4)
LYMPHOCYTES NFR BLD AUTO: 28.1 %
MCH RBC QN AUTO: 32.6 PG (ref 26–34)
MCHC RBC AUTO-ENTMCNC: 34.6 G/DL (ref 31–37)
MCV RBC AUTO: 94.3 FL
MONOCYTES # BLD AUTO: 0.51 X10(3) UL (ref 0.1–1)
MONOCYTES NFR BLD AUTO: 10.5 %
NEUTROPHILS # BLD AUTO: 2.87 X10 (3) UL (ref 1.5–7.7)
NEUTROPHILS # BLD AUTO: 2.87 X10(3) UL (ref 1.5–7.7)
NEUTROPHILS NFR BLD AUTO: 59 %
OSMOLALITY SERPL CALC.SUM OF ELEC: 292 MOSM/KG (ref 275–295)
PLATELET # BLD AUTO: 223 10(3)UL (ref 150–450)
POTASSIUM SERPL-SCNC: 4.2 MMOL/L (ref 3.5–5.1)
PROT SERPL-MCNC: 7.2 G/DL (ref 5.7–8.2)
RBC # BLD AUTO: 3.71 X10(6)UL
SODIUM SERPL-SCNC: 142 MMOL/L (ref 136–145)
WBC # BLD AUTO: 4.9 X10(3) UL (ref 4–11)

## 2024-07-12 PROCEDURE — 99215 OFFICE O/P EST HI 40 MIN: CPT | Performed by: NURSE PRACTITIONER

## 2024-07-12 PROCEDURE — 96413 CHEMO IV INFUSION 1 HR: CPT

## 2024-07-12 PROCEDURE — 85025 COMPLETE CBC W/AUTO DIFF WBC: CPT

## 2024-07-12 PROCEDURE — 80053 COMPREHEN METABOLIC PANEL: CPT

## 2024-07-12 NOTE — PROGRESS NOTES
Patient here for C2D1 Keytruda.  Arrives Ambulating independently, accompanied by Self. Patient denies new issues or concerns, labs reviewed. SW visited patient in infusion area re [place] paperwork.    Patient was evaluated today by RENALDO and Treatment Nurse.    Oral medications included in this regimen:  no    Patient confirms comprehension of cancer treatment schedule:  yes    Pregnancy screening:  Denies possibility of pregnancy    Modifications in dose or schedule:  No    Medications appearance and physical integrity checked by RN: yes.    Chemotherapy IV pump settings verified by 2 RNs:  No due to targeted therapy IV administration.  Frequency of blood return and site check throughout administration: Prior to administration and At completion of therapy     Infusion/treatment outcome:  patient tolerated treatment without incident    Education Record    Learner:  Patient  Barriers / Limitations:  None  Method:  Reinforcement  Education / instructions given: Reinforced plan of care and treatment regimen.  Outcome:  Shows understanding    Discharged Home, Ambulating independently, accompanied by:Self    Patient/family verbalized understanding of future appointments: by MyChart messaging    No

## 2024-07-12 NOTE — PROGRESS NOTES
HPI   Emy Gibson is a 54 year old female here for follow up of Primary cancer of right upper lobe of lung (HCC)    Encounter for antineoplastic immunotherapy.    She s/p of cycle 4 of pemetrexed/cisplatin on 4/26/24.    Currently s/p cycle 1 pembrolizumab     Fatigue:  Yes, states worse with  more cycles.  Now improving.     Fevers:  No    Appetite/taste changes:  Yes, taste and appetite changes    Mucositis:  No    Weight changes:  Yes, gained.    Nausea/vomiting:  Yes, nausea only on/off, used antiemetics.     Diarrhea:  No    Constipation:  No    Peripheral neuropathy:  No    Numbness in the R axilla and in the upper chest.  Pain since surgery.  Stabbing pain with numbness towards breast and axilla as pokes with a hot fork, chest feels like a knife.  If rubs or moves feels better.      ECOG PS 1    Oncology History   Primary cancer of right upper lobe of lung (HCC)   2/14/2024 Cancer Staged    Staging form: Lung, AJCC 8th Edition  - Pathologic stage from 2/14/2024: Stage IIIA (pT2a, pN2, cM0)     2/15/2024 Initial Diagnosis    Adenocarcinoma of right lung (HCC)     2/23/2024 - 4/26/2024 Chemotherapy    OP Pemetrexed / CISplatin  Plan Provider: Igor Mccurdy MD  Treatment goal: Curative  Line of treatment: [No plan line of treatment]     5/29/2024 -  Chemotherapy    OP lung cancer Pembrolizumab  Plan Provider: Stanislaw Maier MD  Treatment goal: Curative- Adjuvant  Line of treatment: [No plan line of treatment]         Review of Systems:     Review of Systems   HENT:   Positive for tinnitus (has gotten worse). Negative for hearing loss.    Respiratory:  Negative for cough and shortness of breath.    Cardiovascular:  Positive for chest pain (post thoracotomy). Leg swelling: at the end of the day on the R foot and pain with dorsiflexion.  Pain on the foot getting out of bed, better flexing it..  Gastrointestinal:  Negative for abdominal pain.   Genitourinary:  Negative for dysuria and frequency.     Musculoskeletal:  Positive for arthralgias (R hip with walking chronic) and back pain (when laying down has pain on the LLE that is lateral and burning, states prior to dx of ca.). Negative for neck pain.   Skin:  Negative for itching and rash.   Neurological:  Positive for dizziness (if gets up too fast.) and numbness (nerve pain arm). Negative for headaches.   Hematological:  Negative for adenopathy. Does not bruise/bleed easily.   Psychiatric/Behavioral:  Positive for sleep disturbance.          Current Outpatient Medications   Medication Sig Dispense Refill    oxyCODONE 5 MG Oral Tab Take 1 tablet (5 mg total) by mouth every 4 (four) hours as needed for Pain. 30 tablet 0    albuterol 108 (90 Base) MCG/ACT Inhalation Aero Soln Inhale 2 puffs into the lungs every 6 (six) hours as needed for Wheezing. 8.5 g 5    meclizine 25 MG Oral Tab Take 1 tablet (25 mg total) by mouth 3 (three) times daily as needed. 21 tablet 0     Allergies:   Allergies   Allergen Reactions    Singulair [Montelukast] SWELLING    Pollen OTHER (SEE COMMENTS)     Nasal congestion, runny nose       Past Medical History:    Adenocarcinoma of lung, right (HCC)    T2aN2    Cancer (HCC)    H/O: hysterectomy    Shortness of breath     Past Surgical History:   Procedure Laterality Date    Hysteroscopy      Lung lobectomy Right 2024    VATS upper lobectomy by Dr. Fong    Tonsillectomy       Social History     Socioeconomic History    Marital status:      Spouse name: Not on file    Number of children: Not on file    Years of education: Not on file    Highest education level: Not on file   Occupational History    Not on file   Tobacco Use    Smoking status: Former     Current packs/day: 0.00     Types: Cigarettes     Quit date: 2024     Years since quittin.4    Smokeless tobacco: Never   Vaping Use    Vaping status: Never Used   Substance and Sexual Activity    Alcohol use: Not Currently    Drug use: Not Currently    Sexual  activity: Not on file   Other Topics Concern    Not on file   Social History Narrative    Not on file     Social Determinants of Health     Financial Resource Strain: Not on file   Food Insecurity: No Food Insecurity (2/8/2024)    Food Insecurity     Food Insecurity: Never true   Transportation Needs: No Transportation Needs (2/8/2024)    Transportation Needs     Lack of Transportation: No   Physical Activity: Not on file   Stress: Not on file   Social Connections: Not on file   Housing Stability: Low Risk  (2/8/2024)    Housing Stability     Housing Instability: No     Housing Instability Emergency: Not on file     Crib or Bassinette: Not on file     Family History   Problem Relation Age of Onset    Hypertension Mother     Cancer Mother     Lung Disorder Mother     Cancer Paternal Grandfather          PHYSICAL EXAM:    /70 (BP Location: Right arm, Patient Position: Sitting, Cuff Size: adult)   Pulse 87   Temp 98.2 °F (36.8 °C) (Oral)   Resp 16   Ht 1.778 m (5' 10\")   Wt 92 kg (202 lb 12.8 oz)   SpO2 98%   BMI 29.10 kg/m²   Wt Readings from Last 6 Encounters:   05/29/24 93.2 kg (205 lb 6.4 oz)   05/24/24 92.5 kg (204 lb)   04/26/24 92 kg (202 lb 14.4 oz)   04/25/24 91.2 kg (201 lb)   04/04/24 90.3 kg (199 lb)   03/15/24 90.6 kg (199 lb 11.2 oz)     Physical Exam  General: Patient is alert, not in acute distress.  HEENT: EOMs intact. PERRL. Oropharynx is clear.   Neck: No JVD. No palpable lymphadenopathy. Neck is supple.  Chest: Clear to auscultation.  Heart: Regular rate and rhythm.   Abdomen: Soft, non tender with good bowel sounds.  Extremities: No edema.  Neurological: Grossly intact.   Lymphatics: There is no palpable lymphadenopathy throughout in the cervical, supraclavicular, axillary, or inguinal regions.  Psych/Depression: nl        ASSESSMENT/PLAN:     1. Primary cancer of right upper lobe of lung (HCC)    2. Encounter for antineoplastic immunotherapy       Cancer Staging   Primary cancer of  right upper lobe of lung (HCC)  Staging form: Lung, AJCC 8th Edition  - Pathologic stage from 2/14/2024: Stage IIIA (pT2a, pN2, cM0) - Signed by Stanislaw Maier MD on 5/24/2024    Patient initially diagnosed with invasive adenocarcinoma of the right upper lobe in December 2023.  She then underwent a right upper lobectomy with mediastinal lymph node dissection on 2/14/2024.  Staging as above.  This was ALK and EGFR negative and PD-L1 40%.    Patient has completed 4 cycles of adjuvant chemotherapy with cisplatin and pembrolizumab, course of treatment from 2/23/2024 through 4/26/2024.    Since the patient is PD-L1 positive, we will proceed with adjuvant pembrolizumab 400 mg every 6 weeks for up to 1 year.  Briefly discussed potential side effects of immune checkpoint inhibitors, and APRN will review with patient prior to initiation of treatment.    S/p cycle 1 pembrolizumab every 6 weeks     Proceed with cycle 2       FMLA needs to be extended   Inrtermittent to cover thru May 2025 (end of therapy)        Discussed with the patient NCCN guidelines for surveillance which recommend H&P and chest CT ± contrast every 3-6 mo  for 3 y, then H&P and chest CT ± contrast every 6 mo for 2 y, then H&P and a low-dose non-contrast-enhanced chest CT annually.    Given the patient has completed her course of chemotherapy, will be now on adjuvant immune checkpoint inhibitor for a year, will be initiating surveillance that will overlap with the first year of the immune checkpoint immunotherapy.  Patient's last chest imaging was in December 2023.  Discussed with patient that we will proceed with a CT scan of the chest with contrast in August 2024 as 6 months, and we will then repeat in 3 to 6 months, as recommended above.    Once patient completes her course of adjuvant treatment, she will be referred to our survivorship clinic.    The patient is advised to begin or continue progressive daily aerobic exercise program, follow a low fat,  low cholesterol diet, attempt to loose weight, decrease or avoid alcohol intake, have regular screening exams as age/gender appropriate, reduce salt in the diet and cooking, reduce exposure to stress, improve dietary compliance and continue current medications.  Information for [facility] and programs to support the above recommendations was provided to the patient.     Patient does not have a primary care doctor.  I discussed with patient the importance of establishing care with a primary care doctor for routine medical care, screening and prevention.  Patient will be provided information about her primary care doctors that are located close to her home.    MDM high risk.  No orders of the defined types were placed in this encounter.      Results From Past 48 Hours:  Recent Results (from the past 48 hour(s))   Comp Metabolic Panel (14)    Collection Time: 07/12/24  9:19 AM   Result Value Ref Range    Glucose 95 70 - 99 mg/dL    Sodium 142 136 - 145 mmol/L    Potassium 4.2 3.5 - 5.1 mmol/L    Chloride 110 98 - 112 mmol/L    CO2 24.0 21.0 - 32.0 mmol/L    Anion Gap 8 0 - 18 mmol/L    BUN 8 (L) 9 - 23 mg/dL    Creatinine 0.86 0.55 - 1.02 mg/dL    BUN/CREA Ratio 9.3 (L) 10.0 - 20.0    Calcium, Total 9.8 8.7 - 10.4 mg/dL    Calculated Osmolality 292 275 - 295 mOsm/kg    eGFR-Cr 80 >=60 mL/min/1.73m2    ALT 65 (H) 10 - 49 U/L    AST 47 (H) <=34 U/L    Alkaline Phosphatase 86 41 - 108 U/L    Bilirubin, Total 0.5 0.3 - 1.2 mg/dL    Total Protein 7.2 5.7 - 8.2 g/dL    Albumin 4.5 3.2 - 4.8 g/dL    Globulin  2.7 2.0 - 3.5 g/dL    A/G Ratio 1.7 1.0 - 2.0    Patient Fasting for CMP? Patient not present    CBC W/ DIFFERENTIAL    Collection Time: 07/12/24  9:19 AM   Result Value Ref Range    WBC 4.9 4.0 - 11.0 x10(3) uL    RBC 3.71 (L) 3.80 - 5.30 x10(6)uL    HGB 12.1 12.0 - 16.0 g/dL    HCT 35.0 35.0 - 48.0 %    MCV 94.3 80.0 - 100.0 fL    MCH 32.6 26.0 - 34.0 pg    MCHC 34.6 31.0 - 37.0 g/dL    RDW-SD 39.8 35.1 - 46.3  fL    RDW 11.6 11.0 - 15.0 %    .0 150.0 - 450.0 10(3)uL    Neutrophil Absolute Prelim 2.87 1.50 - 7.70 x10 (3) uL    Neutrophil Absolute 2.87 1.50 - 7.70 x10(3) uL    Lymphocyte Absolute 1.37 1.00 - 4.00 x10(3) uL    Monocyte Absolute 0.51 0.10 - 1.00 x10(3) uL    Eosinophil Absolute 0.07 0.00 - 0.70 x10(3) uL    Basophil Absolute 0.04 0.00 - 0.20 x10(3) uL    Immature Granulocyte Absolute 0.01 0.00 - 1.00 x10(3) uL    Neutrophil % 59.0 %    Lymphocyte % 28.1 %    Monocyte % 10.5 %    Eosinophil % 1.4 %    Basophil % 0.8 %    Immature Granulocyte % 0.2 %         Imaging & Referrals:  None   No orders of the defined types were placed in this encounter.

## 2024-07-18 ENCOUNTER — TELEPHONE (OUTPATIENT)
Dept: ADMINISTRATIVE | Age: 54
End: 2024-07-18

## 2024-07-18 NOTE — TELEPHONE ENCOUNTER
Dr. Maier,     *The ACKNOWLEDGE button has been moved to the top right ribbon*    Please sign off on form if you agree to:  Intermittent Family Medical Leave Act (for immunotherapy tx), re-certification  (place your signature on the first page only)    -From your Inbasket, Highlight the patient and click Chart   -Double click the 7/18/24 Forms Completion telephone encounter  -Scroll down to the Media section   -Click the blue Hyperlink:  Family Medical Leave Act Dr. Maier 7/18/24  -Click Acknowledge located in the top right ribbon/menu   -Drag the mouse into the blank space of the document and a + sign will appear. Left click to   electronically sign the document.     Thank you,    Sera

## 2024-07-19 NOTE — TELEPHONE ENCOUNTER
Family Medical Leave Act forms completed & uploaded to [facility], per patient's request.

## 2024-08-09 ENCOUNTER — HOSPITAL ENCOUNTER (OUTPATIENT)
Dept: CT IMAGING | Facility: HOSPITAL | Age: 54
Discharge: HOME | End: 2024-08-09
Attending: INTERNAL MEDICINE
Payer: COMMERCIAL

## 2024-08-09 DIAGNOSIS — C34.11 PRIMARY CANCER OF RIGHT UPPER LOBE OF LUNG (HCC): ICD-10-CM

## 2024-08-09 PROCEDURE — 71260 CT THORAX DX C+: CPT | Performed by: INTERNAL MEDICINE

## 2024-08-23 ENCOUNTER — OFFICE VISIT (OUTPATIENT)
Dept: HEMATOLOGY/ONCOLOGY | Facility: HOSPITAL | Age: 54
End: 2024-08-23
Attending: INTERNAL MEDICINE
Payer: COMMERCIAL

## 2024-08-23 VITALS
DIASTOLIC BLOOD PRESSURE: 52 MMHG | TEMPERATURE: 98 F | HEART RATE: 70 BPM | RESPIRATION RATE: 16 BRPM | BODY MASS INDEX: 30.06 KG/M2 | HEIGHT: 70 IN | SYSTOLIC BLOOD PRESSURE: 104 MMHG | OXYGEN SATURATION: 99 % | WEIGHT: 210 LBS

## 2024-08-23 DIAGNOSIS — C34.11 PRIMARY CANCER OF RIGHT UPPER LOBE OF LUNG (HCC): Primary | ICD-10-CM

## 2024-08-23 DIAGNOSIS — Z51.12 ENCOUNTER FOR ANTINEOPLASTIC IMMUNOTHERAPY: ICD-10-CM

## 2024-08-23 DIAGNOSIS — E03.2 HYPOTHYROIDISM DUE TO MEDICATION: ICD-10-CM

## 2024-08-23 DIAGNOSIS — G47.01 INSOMNIA DUE TO MEDICAL CONDITION: ICD-10-CM

## 2024-08-23 LAB
ALBUMIN SERPL-MCNC: 4.8 G/DL (ref 3.2–4.8)
ALBUMIN/GLOB SERPL: 1.7 {RATIO} (ref 1–2)
ALP LIVER SERPL-CCNC: 76 U/L
ALT SERPL-CCNC: 22 U/L
ANION GAP SERPL CALC-SCNC: 8 MMOL/L (ref 0–18)
AST SERPL-CCNC: 27 U/L (ref ?–34)
BASOPHILS # BLD AUTO: 0.08 X10(3) UL (ref 0–0.2)
BASOPHILS NFR BLD AUTO: 1.4 %
BILIRUB SERPL-MCNC: 0.4 MG/DL (ref 0.3–1.2)
BUN BLD-MCNC: 8 MG/DL (ref 9–23)
BUN/CREAT SERPL: 7.8 (ref 10–20)
CALCIUM BLD-MCNC: 9.9 MG/DL (ref 8.7–10.4)
CHLORIDE SERPL-SCNC: 108 MMOL/L (ref 98–112)
CO2 SERPL-SCNC: 24 MMOL/L (ref 21–32)
CREAT BLD-MCNC: 1.03 MG/DL
DEPRECATED RDW RBC AUTO: 42.8 FL (ref 35.1–46.3)
EGFRCR SERPLBLD CKD-EPI 2021: 65 ML/MIN/1.73M2 (ref 60–?)
EOSINOPHIL # BLD AUTO: 0.08 X10(3) UL (ref 0–0.7)
EOSINOPHIL NFR BLD AUTO: 1.4 %
ERYTHROCYTE [DISTWIDTH] IN BLOOD BY AUTOMATED COUNT: 12.3 % (ref 11–15)
GLOBULIN PLAS-MCNC: 2.9 G/DL (ref 2–3.5)
GLUCOSE BLD-MCNC: 99 MG/DL (ref 70–99)
HCT VFR BLD AUTO: 40.5 %
HGB BLD-MCNC: 13.7 G/DL
IMM GRANULOCYTES # BLD AUTO: 0.01 X10(3) UL (ref 0–1)
IMM GRANULOCYTES NFR BLD: 0.2 %
LYMPHOCYTES # BLD AUTO: 2.02 X10(3) UL (ref 1–4)
LYMPHOCYTES NFR BLD AUTO: 35.3 %
MCH RBC QN AUTO: 32 PG (ref 26–34)
MCHC RBC AUTO-ENTMCNC: 33.8 G/DL (ref 31–37)
MCV RBC AUTO: 94.6 FL
MONOCYTES # BLD AUTO: 0.36 X10(3) UL (ref 0.1–1)
MONOCYTES NFR BLD AUTO: 6.3 %
NEUTROPHILS # BLD AUTO: 3.17 X10 (3) UL (ref 1.5–7.7)
NEUTROPHILS # BLD AUTO: 3.17 X10(3) UL (ref 1.5–7.7)
NEUTROPHILS NFR BLD AUTO: 55.4 %
OSMOLALITY SERPL CALC.SUM OF ELEC: 288 MOSM/KG (ref 275–295)
PLATELET # BLD AUTO: 245 10(3)UL (ref 150–450)
POTASSIUM SERPL-SCNC: 4.5 MMOL/L (ref 3.5–5.1)
PROT SERPL-MCNC: 7.7 G/DL (ref 5.7–8.2)
RBC # BLD AUTO: 4.28 X10(6)UL
SODIUM SERPL-SCNC: 140 MMOL/L (ref 136–145)
T4 FREE SERPL-MCNC: 0.7 NG/DL (ref 0.8–1.7)
TSI SER-ACNC: 24.59 MIU/ML (ref 0.55–4.78)
WBC # BLD AUTO: 5.7 X10(3) UL (ref 4–11)

## 2024-08-23 PROCEDURE — 99215 OFFICE O/P EST HI 40 MIN: CPT | Performed by: INTERNAL MEDICINE

## 2024-08-23 PROCEDURE — 84443 ASSAY THYROID STIM HORMONE: CPT

## 2024-08-23 PROCEDURE — 80053 COMPREHEN METABOLIC PANEL: CPT

## 2024-08-23 PROCEDURE — 85025 COMPLETE CBC W/AUTO DIFF WBC: CPT

## 2024-08-23 PROCEDURE — 96413 CHEMO IV INFUSION 1 HR: CPT

## 2024-08-23 PROCEDURE — 84439 ASSAY OF FREE THYROXINE: CPT

## 2024-08-23 RX ORDER — LORAZEPAM 0.5 MG/1
0.5 TABLET ORAL NIGHTLY PRN
Qty: 30 TABLET | Refills: 0 | Status: SHIPPED | OUTPATIENT
Start: 2024-08-23

## 2024-08-23 NOTE — PROGRESS NOTES
HPI   Emy Gibson is a 54 year old female here for follow up of Primary cancer of right upper lobe of lung (HCC)    Encounter for antineoplastic immunotherapy    Hypothyroidism due to medication.    She s/p of cycle 4 of pemetrexed/cisplatin on 4/26/24.    Currently s/p cycle 2 pembrolizumab     Fatigue:  Yes, states worse with  more cycles.  States lots of stressors in the family may contribute to fatigue, not sleeping as well.      Fevers:  No    Appetite/taste changes:  Yes, taste and appetite changes , states thinks from having quit smoking.    Mucositis:  No    Weight changes:  Yes, gained.    Nausea/vomiting:  Yes, nausea only on/off     Diarrhea:  No    Constipation:  No    Peripheral neuropathy:  Yes, since last visit intermittent tingling of the toes.      Numbness in the R axilla and in the upper chest.  Pain since surgery.  Stabbing pain with numbness towards breast and axilla as pokes with a hot fork, chest feels like a knife.  If rubs or moves feels better.      ECOG PS 1    Oncology History   Primary cancer of right upper lobe of lung (HCC)   2/14/2024 Cancer Staged    Staging form: Lung, AJCC 8th Edition  - Pathologic stage from 2/14/2024: Stage IIIA (pT2a, pN2, cM0)     2/15/2024 Initial Diagnosis    Adenocarcinoma of right lung (HCC)     2/23/2024 - 4/26/2024 Chemotherapy    OP Pemetrexed / CISplatin  Plan Provider: Igor Mccurdy MD  Treatment goal: Curative  Line of treatment: [No plan line of treatment]     5/29/2024 -  Chemotherapy    OP lung cancer Pembrolizumab  Plan Provider: Stanislaw Maier MD  Treatment goal: Curative- Adjuvant  Line of treatment: [No plan line of treatment]         Review of Systems:     Review of Systems   HENT:   Positive for tinnitus (has gotten better with massage or tapping behind the ears.). Negative for hearing loss.    Respiratory:  Negative for cough and shortness of breath.    Cardiovascular:  Positive for chest pain (post thoracotomy) and leg swelling (at the  end of the day on the R foot and pain with dorsiflexion.  Pain on the foot getting out of bed, better flexing it.).   Gastrointestinal:  Negative for abdominal pain.   Genitourinary:  Negative for dysuria and frequency.    Musculoskeletal:  Positive for arthralgias (R hip with walking chronic) and back pain (when laying down has pain on the LLE that is lateral and burning, states prior to dx of ca.). Negative for neck pain.   Skin:  Negative for itching and rash.   Neurological:  Positive for dizziness (if gets up too fast.) and numbness (nerve pain arm). Negative for headaches.   Hematological:  Negative for adenopathy. Does not bruise/bleed easily.   Psychiatric/Behavioral:  Positive for sleep disturbance.          Current Outpatient Medications   Medication Sig Dispense Refill    albuterol 108 (90 Base) MCG/ACT Inhalation Aero Soln Inhale 2 puffs into the lungs every 6 (six) hours as needed for Wheezing. 8.5 g 5    meclizine 25 MG Oral Tab Take 1 tablet (25 mg total) by mouth 3 (three) times daily as needed. 21 tablet 0     Allergies:   Allergies   Allergen Reactions    Singulair [Montelukast] SWELLING    Pollen OTHER (SEE COMMENTS)     Nasal congestion, runny nose       Past Medical History:    Adenocarcinoma of lung, right (HCC)    T2aN2    Cancer (HCC)    H/O: hysterectomy    Shortness of breath     Past Surgical History:   Procedure Laterality Date    Hysteroscopy      Lung lobectomy Right 2024    VATS upper lobectomy by Dr. Fong    Tonsillectomy       Social History     Socioeconomic History    Marital status:      Spouse name: Not on file    Number of children: Not on file    Years of education: Not on file    Highest education level: Not on file   Occupational History    Not on file   Tobacco Use    Smoking status: Former     Current packs/day: 0.00     Types: Cigarettes     Quit date: 2024     Years since quittin.5    Smokeless tobacco: Never   Vaping Use    Vaping status: Never  Used   Substance and Sexual Activity    Alcohol use: Not Currently    Drug use: Not Currently    Sexual activity: Not on file   Other Topics Concern    Not on file   Social History Narrative    Not on file     Social Determinants of Health     Financial Resource Strain: Not on file   Food Insecurity: No Food Insecurity (2/8/2024)    Food Insecurity     Food Insecurity: Never true   Transportation Needs: No Transportation Needs (2/8/2024)    Transportation Needs     Lack of Transportation: No   Physical Activity: Not on file   Stress: Not on file   Social Connections: Not on file   Housing Stability: Low Risk  (2/8/2024)    Housing Stability     Housing Instability: No     Housing Instability Emergency: Not on file     Crib or Bassinette: Not on file     Family History   Problem Relation Age of Onset    Hypertension Mother     Cancer Mother     Lung Disorder Mother     Cancer Paternal Grandfather          PHYSICAL EXAM:    /52 (BP Location: Left arm, Patient Position: Sitting, Cuff Size: adult)   Pulse 70   Temp 97.8 °F (36.6 °C) (Oral)   Resp 16   Ht 1.778 m (5' 10\")   Wt 95.3 kg (210 lb)   SpO2 99%   BMI 30.13 kg/m²   Wt Readings from Last 6 Encounters:   08/23/24 95.3 kg (210 lb)   07/12/24 92 kg (202 lb 12.8 oz)   05/29/24 93.2 kg (205 lb 6.4 oz)   05/24/24 92.5 kg (204 lb)   04/26/24 92 kg (202 lb 14.4 oz)   04/25/24 91.2 kg (201 lb)     Physical Exam  General: Patient is alert, not in acute distress.  HEENT: EOMs intact. PERRL. Oropharynx is clear.   Neck: No JVD. No palpable lymphadenopathy. Neck is supple.  Chest: Clear to auscultation.  Heart: Regular rate and rhythm.   Abdomen: Soft, non tender with good bowel sounds.  Extremities: No edema.  Neurological: Grossly intact.   Lymphatics: There is no palpable lymphadenopathy throughout in the cervical, supraclavicular, axillary, or inguinal regions.  Psych/Depression: nl        ASSESSMENT/PLAN:     1. Primary cancer of right upper lobe of lung  (HCC)    2. Encounter for antineoplastic immunotherapy    3. Hypothyroidism due to medication       Cancer Staging   Primary cancer of right upper lobe of lung (HCC)  Staging form: Lung, AJCC 8th Edition  - Pathologic stage from 2/14/2024: Stage IIIA (pT2a, pN2, cM0) - Signed by Stanislaw Maier MD on 5/24/2024    Patient initially diagnosed with invasive adenocarcinoma of the right upper lobe in December 2023.  She then underwent a right upper lobectomy with mediastinal lymph node dissection on 2/14/2024.  Staging as above.  This was ALK and EGFR negative and PD-L1 40%.    Patient has completed 4 cycles of adjuvant chemotherapy with cisplatin and pembrolizumab, course of treatment from 2/23/2024 through 4/26/2024.    Since the patient is PD-L1 positive, we will proceed with adjuvant pembrolizumab 400 mg every 6 weeks for up to 1 year.  Briefly discussed potential side effects of immune checkpoint inhibitors, and APRN will review with patient prior to initiation of treatment.    S/p cycle 2  pembrolizumab every 6 weeks     Surveillance CT in August of 2024 JENNIFER.  Next due in February of 2025.    Proceed with cycle 3     Hypothyroidism from immunotherapy:  Endocrine consultation for management.    Insomnia:  lorazepam 0.5 mg po at bedtime prn       FMLA needs to be extended   Inrtermittent to cover thru May 2025 (end of therapy)        Discussed with the patient NCCN guidelines for surveillance which recommend H&P and chest CT ± contrast every 3-6 mo  for 3 y, then H&P and chest CT ± contrast every 6 mo for 2 y, then H&P and a low-dose non-contrast-enhanced chest CT annually.    Given the patient has completed her course of chemotherapy, will be now on adjuvant immune checkpoint inhibitor for a year, will be initiating surveillance that will overlap with the first year of the immune checkpoint immunotherapy.  Patient's last chest imaging was in December 2023.  Discussed with patient that we will proceed with a CT scan  of the chest with contrast in August 2024 as 6 months, and we will then repeat in 3 to 6 months, as recommended above.    Once patient completes her course of adjuvant treatment, she will be referred to our survivorship clinic.    The patient is advised to begin or continue progressive daily aerobic exercise program, follow a low fat, low cholesterol diet, attempt to loose weight, decrease or avoid alcohol intake, have regular screening exams as age/gender appropriate, reduce salt in the diet and cooking, reduce exposure to stress, improve dietary compliance and continue current medications.  Information for the [facility] and programs to support the above recommendations was provided to the patient.     Patient does not have a primary care doctor.  I discussed with patient the importance of establishing care with a primary care doctor for routine medical care, screening and prevention.  Patient will be provided information about her primary care doctors that are located close to her home.    MDM high risk.  No orders of the defined types were placed in this encounter.      Results From Past 48 Hours:  Recent Results (from the past 48 hour(s))   CBC W Differential W Platelet    Collection Time: 08/23/24 11:10 AM   Result Value Ref Range    WBC 5.7 4.0 - 11.0 x10(3) uL    RBC 4.28 3.80 - 5.30 x10(6)uL    HGB 13.7 12.0 - 16.0 g/dL    HCT 40.5 35.0 - 48.0 %    MCV 94.6 80.0 - 100.0 fL    MCH 32.0 26.0 - 34.0 pg    MCHC 33.8 31.0 - 37.0 g/dL    RDW-SD 42.8 35.1 - 46.3 fL    RDW 12.3 11.0 - 15.0 %    .0 150.0 - 450.0 10(3)uL    Neutrophil Absolute Prelim 3.17 1.50 - 7.70 x10 (3) uL    Neutrophil Absolute 3.17 1.50 - 7.70 x10(3) uL    Lymphocyte Absolute 2.02 1.00 - 4.00 x10(3) uL    Monocyte Absolute 0.36 0.10 - 1.00 x10(3) uL    Eosinophil Absolute 0.08 0.00 - 0.70 x10(3) uL    Basophil Absolute 0.08 0.00 - 0.20 x10(3) uL    Immature Granulocyte Absolute 0.01 0.00 - 1.00 x10(3) uL    Neutrophil % 55.4 %     Lymphocyte % 35.3 %    Monocyte % 6.3 %    Eosinophil % 1.4 %    Basophil % 1.4 %    Immature Granulocyte % 0.2 %   Comp Metabolic Panel (14)    Collection Time: 08/23/24 11:10 AM   Result Value Ref Range    Glucose 99 70 - 99 mg/dL    Sodium 140 136 - 145 mmol/L    Potassium 4.5 3.5 - 5.1 mmol/L    Chloride 108 98 - 112 mmol/L    CO2 24.0 21.0 - 32.0 mmol/L    Anion Gap 8 0 - 18 mmol/L    BUN 8 (L) 9 - 23 mg/dL    Creatinine 1.03 (H) 0.55 - 1.02 mg/dL    BUN/CREA Ratio 7.8 (L) 10.0 - 20.0    Calcium, Total 9.9 8.7 - 10.4 mg/dL    Calculated Osmolality 288 275 - 295 mOsm/kg    eGFR-Cr 65 >=60 mL/min/1.73m2    ALT 22 10 - 49 U/L    AST 27 <34 U/L    Alkaline Phosphatase 76 41 - 108 U/L    Bilirubin, Total 0.4 0.3 - 1.2 mg/dL    Total Protein 7.7 5.7 - 8.2 g/dL    Albumin 4.8 3.2 - 4.8 g/dL    Globulin  2.9 2.0 - 3.5 g/dL    A/G Ratio 1.7 1.0 - 2.0    Patient Fasting for CMP? Patient not present    TSH [E]    Collection Time: 08/23/24 11:10 AM   Result Value Ref Range    TSH 24.587 (H) 0.550 - 4.780 mIU/mL   T4 FREE [E]    Collection Time: 08/23/24 11:10 AM   Result Value Ref Range    Free T4 0.7 (L) 0.8 - 1.7 ng/dL     PROCEDURE: CT CHEST(CONTRAST ONLY) (CPT=71260)      COMPARISON: [facility], CT CHEST PE AORTA (IV ONLY) (CPT=71260), 11/06/2023, 5:01 PM.      INDICATIONS: Primary cancer of right upper lobe of lung (HCC)      TECHNIQUE: CT images of the chest were obtained with non-ionic intravenous contrast material.  Automated exposure control for dose reduction was used. Adjustment of the mA and/or kV was done based on the patient's size. Use of iterative reconstruction   technique for dose reduction was used. Dose information is transmitted to the ACR (American College of Radiology) NRDR (National Radiology Data Registry) which includes the Dose Index Registry.      FINDINGS:   Normal heart size.  Normal pericardium.  No significant coronary calcification      No adenopathy.  No pleural fluid or  nodularity.  Tiny sliding hiatal hernia      Interval right upper lobectomy      Mild emphysema.  No solid nodules.  No pneumonitis.  Stable 4 millimeter ground-glass opacity nodule in the left upper lobe image 49      No bone lesion      Normal adrenals               Impression  CONCLUSION: No evidence of disease            Dictated by (CST): Judson Alexander MD on 8/09/2024 at 9:47 AM       Finalized by (CST): Judson Alexander MD on 8/09/2024 at 9:50 AM           Imaging & Referrals:  None   No orders of the defined types were placed in this encounter.

## 2024-08-23 NOTE — PROGRESS NOTES
Pt here for C3D1 Drug(s)KEYTRUDA.  Arrives Ambulating independently, accompanied by Self     Patient was evaluated today by MD.    Oral medications included in this regimen:  no    Patient confirms comprehension of cancer treatment schedule:  yes    Pregnancy screening:  Denies possibility of pregnancy    Modifications in dose or schedule:  No    Medications appearance and physical integrity checked by RN: yes.    Chemotherapy IV pump settings verified by 2 RNs:  No due to targeted therapy IV administration.  Frequency of blood return and site check throughout administration: Prior to administration, Prior to each drug, and At completion of therapy     Infusion/treatment outcome:  patient tolerated treatment without incident    Education Record    Learner:  Patient  Barriers / Limitations:  None  Method:  Reinforcement  Education / instructions given:  Plan  of care  Outcome:  Shows understanding    Discharged Home, Ambulating independently, accompanied by:Self    Patient/family verbalized understanding of future appointments: by MyChart messaging

## 2024-09-11 ENCOUNTER — TELEPHONE (OUTPATIENT)
Dept: HEMATOLOGY/ONCOLOGY | Facility: HOSPITAL | Age: 54
End: 2024-09-11

## 2024-09-11 NOTE — TELEPHONE ENCOUNTER
ALLIED NURSE - Angélica P) [phone]   /  F) [phone]  We need the latest office visit notes and any scans. Thanks Alejandra

## 2024-09-11 NOTE — TELEPHONE ENCOUNTER
Patient called and per patient received a phone call but, not returning call to . Patient does not wish for progress notes and scans to be sent. Patient informed will not send any information per request.

## 2024-09-25 ENCOUNTER — TELEPHONE (OUTPATIENT)
Dept: HEMATOLOGY/ONCOLOGY | Facility: HOSPITAL | Age: 54
End: 2024-09-25

## 2024-09-25 NOTE — TELEPHONE ENCOUNTER
Angélica,  at [facility] called stated she has requested progress notes 4 times and hasn't received anything.    I spoke with Dr. Maier's nurse and per the note the nurse advise she can't speak to Angélica bc the pt doesn't want us to share info with her at this time    I advised Angélica of this and she stated if that is the case she can not approve anymore treatment for the pt to continue to see Dr. Maier.     Angélica stated she will let the claims payor at [facility] know and they will close out her case management case    Moving fwd if doctor wants to continue to give pt her Pembro then the doctor will have to contact the claims payor how to move fwd    Angélica's direct fax is  [phone] or her phone is [phone]

## 2024-09-25 NOTE — TELEPHONE ENCOUNTER
Informed that patient does not wish to share information with  RN case manager. Per  needs information for prior authorization. Call transferred to prior authorization Brisa Angel.

## 2024-10-03 DIAGNOSIS — C34.11 PRIMARY CANCER OF RIGHT UPPER LOBE OF LUNG (HCC): Primary | ICD-10-CM

## 2024-10-04 ENCOUNTER — NURSE ONLY (OUTPATIENT)
Dept: HEMATOLOGY/ONCOLOGY | Facility: HOSPITAL | Age: 54
End: 2024-10-04
Attending: INTERNAL MEDICINE
Payer: COMMERCIAL

## 2024-10-04 VITALS
HEIGHT: 70 IN | TEMPERATURE: 98 F | DIASTOLIC BLOOD PRESSURE: 55 MMHG | HEART RATE: 72 BPM | WEIGHT: 217 LBS | BODY MASS INDEX: 31.07 KG/M2 | OXYGEN SATURATION: 98 % | RESPIRATION RATE: 16 BRPM | SYSTOLIC BLOOD PRESSURE: 110 MMHG

## 2024-10-04 DIAGNOSIS — Z51.12 ENCOUNTER FOR ANTINEOPLASTIC IMMUNOTHERAPY: ICD-10-CM

## 2024-10-04 DIAGNOSIS — C34.11 PRIMARY CANCER OF RIGHT UPPER LOBE OF LUNG (HCC): Primary | ICD-10-CM

## 2024-10-04 DIAGNOSIS — C34.11 PRIMARY CANCER OF RIGHT UPPER LOBE OF LUNG (HCC): ICD-10-CM

## 2024-10-04 LAB
ALP LIVER SERPL-CCNC: 82 U/L
ANION GAP SERPL CALC-SCNC: 7 MMOL/L (ref 0–18)
BASOPHILS # BLD AUTO: 0.08 X10(3) UL (ref 0–0.2)
BASOPHILS NFR BLD AUTO: 1.2 %
BILIRUB SERPL-MCNC: 0.5 MG/DL (ref 0.3–1.2)
CALCIUM BLD-MCNC: 9.4 MG/DL (ref 8.7–10.4)
CHLORIDE SERPL-SCNC: 106 MMOL/L (ref 98–112)
CO2 SERPL-SCNC: 24 MMOL/L (ref 21–32)
CREAT BLD-MCNC: 1.12 MG/DL
DEPRECATED RDW RBC AUTO: 46.3 FL (ref 35.1–46.3)
EGFRCR SERPLBLD CKD-EPI 2021: 58 ML/MIN/1.73M2 (ref 60–?)
EOSINOPHIL # BLD AUTO: 0.08 X10(3) UL (ref 0–0.7)
EOSINOPHIL NFR BLD AUTO: 1.2 %
ERYTHROCYTE [DISTWIDTH] IN BLOOD BY AUTOMATED COUNT: 13.6 % (ref 11–15)
GLUCOSE BLD-MCNC: 97 MG/DL (ref 70–99)
HCT VFR BLD AUTO: 42.1 %
HGB BLD-MCNC: 14.2 G/DL
IMM GRANULOCYTES # BLD AUTO: 0.02 X10(3) UL (ref 0–1)
IMM GRANULOCYTES NFR BLD: 0.3 %
LYMPHOCYTES # BLD AUTO: 2.33 X10(3) UL (ref 1–4)
LYMPHOCYTES NFR BLD AUTO: 36 %
MCH RBC QN AUTO: 31.3 PG (ref 26–34)
MCHC RBC AUTO-ENTMCNC: 33.7 G/DL (ref 31–37)
MCV RBC AUTO: 92.9 FL
MONOCYTES # BLD AUTO: 0.32 X10(3) UL (ref 0.1–1)
MONOCYTES NFR BLD AUTO: 4.9 %
NEUTROPHILS # BLD AUTO: 3.65 X10 (3) UL (ref 1.5–7.7)
NEUTROPHILS # BLD AUTO: 3.65 X10(3) UL (ref 1.5–7.7)
NEUTROPHILS NFR BLD AUTO: 56.4 %
PLATELET # BLD AUTO: 264 10(3)UL (ref 150–450)
PROT SERPL-MCNC: 8.1 G/DL (ref 5.7–8.2)
RBC # BLD AUTO: 4.53 X10(6)UL
SODIUM SERPL-SCNC: 137 MMOL/L (ref 136–145)
WBC # BLD AUTO: 6.5 X10(3) UL (ref 4–11)

## 2024-10-04 PROCEDURE — 96413 CHEMO IV INFUSION 1 HR: CPT

## 2024-10-04 PROCEDURE — 99215 OFFICE O/P EST HI 40 MIN: CPT | Performed by: INTERNAL MEDICINE

## 2024-10-04 PROCEDURE — 85025 COMPLETE CBC W/AUTO DIFF WBC: CPT

## 2024-10-04 PROCEDURE — 80053 COMPREHEN METABOLIC PANEL: CPT

## 2024-10-04 NOTE — PROGRESS NOTES
HPI   Emy Gibson is a 54 year old female here for follow up of Primary cancer of right upper lobe of lung (HCC)    Encounter for antineoplastic immunotherapy.    She s/p of cycle 4 of pemetrexed/cisplatin on 4/26/24.    Currently s/p cycle 3 pembrolizumab     Fatigue:  Yes, states worse with  more cycles.  States lots of stressors in the family may contribute to fatigue, not sleeping as well.      Fevers:  No    Appetite/taste changes:  Yes, taste and appetite changes , states thinks from having quit smoking.    Mucositis:  No    Weight changes:  Yes, gained.    Nausea/vomiting:  No    Diarrhea:  No    Constipation:  No    Peripheral neuropathy:  Yes, since last visit intermittent tingling of the toes.      Numbness in the R axilla and in the upper chest.  Pain since surgery.  Stabbing pain with numbness towards breast and axilla as pokes with a hot fork, chest feels like a knife.  If rubs or moves feels better.      ECOG PS 1    Oncology History   Primary cancer of right upper lobe of lung (HCC)   2/14/2024 Cancer Staged    Staging form: Lung, AJCC 8th Edition  - Pathologic stage from 2/14/2024: Stage IIIA (pT2a, pN2, cM0)     2/15/2024 Initial Diagnosis    Adenocarcinoma of right lung (HCC)     2/23/2024 - 4/26/2024 Chemotherapy    OP Pemetrexed / CISplatin  Plan Provider: Igor Mccurdy MD  Treatment goal: Curative  Line of treatment: [No plan line of treatment]     5/29/2024 -  Chemotherapy    OP lung cancer Pembrolizumab  Plan Provider: Stanislaw Maier MD  Treatment goal: Curative- Adjuvant  Line of treatment: [No plan line of treatment]         Review of Systems:     Review of Systems   HENT:   Positive for tinnitus (has gotten better with massage or tapping behind the ears.).         Sinus pressure last week   Respiratory:  Negative for cough and shortness of breath.    Cardiovascular:  Positive for chest pain (post thoracotomy). Negative for leg swelling.   Gastrointestinal:  Negative for abdominal pain.    Genitourinary:  Negative for dysuria and frequency.    Musculoskeletal:  Positive for arthralgias (R hip with walking chronic) and back pain (when laying down has pain on the LLE that is lateral and burning, states prior to dx of ca.). Negative for neck pain.   Skin:  Negative for itching and rash.   Neurological:  Positive for dizziness (if gets up too fast.) and numbness (nerve pain arm post thoracotomy, easing up.). Negative for headaches.   Hematological:  Negative for adenopathy. Does not bruise/bleed easily.   Psychiatric/Behavioral:  Positive for sleep disturbance.          Current Outpatient Medications   Medication Sig Dispense Refill    LORazepam 0.5 MG Oral Tab Take 1 tablet (0.5 mg total) by mouth nightly as needed for Anxiety. 30 tablet 0    albuterol 108 (90 Base) MCG/ACT Inhalation Aero Soln Inhale 2 puffs into the lungs every 6 (six) hours as needed for Wheezing. 8.5 g 5    meclizine 25 MG Oral Tab Take 1 tablet (25 mg total) by mouth 3 (three) times daily as needed. 21 tablet 0     Allergies:   Allergies   Allergen Reactions    Singulair [Montelukast] SWELLING    Pollen OTHER (SEE COMMENTS)     Nasal congestion, runny nose       Past Medical History:    Adenocarcinoma of lung, right (HCC)    T2aN2    Cancer (HCC)    H/O: hysterectomy    Shortness of breath     Past Surgical History:   Procedure Laterality Date    Hysteroscopy      Lung lobectomy Right 2024    VATS upper lobectomy by Dr. Fong    Tonsillectomy       Social History     Socioeconomic History    Marital status:      Spouse name: Not on file    Number of children: Not on file    Years of education: Not on file    Highest education level: Not on file   Occupational History    Not on file   Tobacco Use    Smoking status: Former     Current packs/day: 0.00     Types: Cigarettes     Quit date: 2024     Years since quittin.7    Smokeless tobacco: Never   Vaping Use    Vaping status: Never Used   Substance and Sexual  Activity    Alcohol use: Not Currently    Drug use: Not Currently    Sexual activity: Not on file   Other Topics Concern    Not on file   Social History Narrative    Not on file     Social Determinants of Health     Financial Resource Strain: Not on file   Food Insecurity: No Food Insecurity (2/8/2024)    Food Insecurity     Food Insecurity: Never true   Transportation Needs: No Transportation Needs (2/8/2024)    Transportation Needs     Lack of Transportation: No   Physical Activity: Not on file   Stress: Not on file   Social Connections: Not on file   Housing Stability: Low Risk  (2/8/2024)    Housing Stability     Housing Instability: No     Housing Instability Emergency: Not on file     Crib or Bassinette: Not on file     Family History   Problem Relation Age of Onset    Hypertension Mother     Cancer Mother     Lung Disorder Mother     Cancer Paternal Grandfather          PHYSICAL EXAM:    /55 (BP Location: Right arm, Patient Position: Sitting, Cuff Size: adult)   Pulse 72   Temp 98.1 °F (36.7 °C) (Oral)   Resp 16   Ht 1.778 m (5' 10\")   Wt 98.4 kg (217 lb)   SpO2 98%   BMI 31.14 kg/m²   Wt Readings from Last 6 Encounters:   10/04/24 98.4 kg (217 lb)   08/23/24 95.3 kg (210 lb)   07/12/24 92 kg (202 lb 12.8 oz)   05/29/24 93.2 kg (205 lb 6.4 oz)   05/24/24 92.5 kg (204 lb)   04/26/24 92 kg (202 lb 14.4 oz)     Physical Exam  General: Patient is alert, not in acute distress.  HEENT: EOMs intact. PERRL. Oropharynx is clear.   Neck: No JVD. No palpable lymphadenopathy. Neck is supple.  Chest: Clear to auscultation.  Heart: Regular rate and rhythm.   Abdomen: Soft, non tender with good bowel sounds.  Extremities: No edema.  Neurological: Grossly intact.   Lymphatics: There is no palpable lymphadenopathy throughout in the cervical, supraclavicular, axillary, or inguinal regions.  Psych/Depression: nl        ASSESSMENT/PLAN:     1. Primary cancer of right upper lobe of lung (HCC)    2. Encounter for  antineoplastic immunotherapy       Cancer Staging   Primary cancer of right upper lobe of lung (HCC)  Staging form: Lung, AJCC 8th Edition  - Pathologic stage from 2/14/2024: Stage IIIA (pT2a, pN2, cM0) - Signed by Stanislaw Maier MD on 5/24/2024    Patient initially diagnosed with invasive adenocarcinoma of the right upper lobe in December 2023.  She then underwent a right upper lobectomy with mediastinal lymph node dissection on 2/14/2024.  Staging as above.  This was ALK and EGFR negative and PD-L1 40%.    Patient has completed 4 cycles of adjuvant chemotherapy with cisplatin and pembrolizumab, course of treatment from 2/23/2024 through 4/26/2024.    Since the patient is PD-L1 positive, we will proceed with adjuvant pembrolizumab 400 mg every 6 weeks for up to 1 year.      S/p cycle 3  pembrolizumab every 6 weeks     Surveillance CT in August of 2024 JENNIFER.  Next due in February of 2025.    Proceed with cycle 4     Hypothyroidism from immunotherapy:  Endocrine consultation for management.    Insomnia:  lorazepam 0.5 mg po at bedtime prn       FMLA needs to be extended   Inrtermittent to cover thru May 2025 (end of therapy)        Discussed with the patient NCCN guidelines for surveillance which recommend H&P and chest CT ± contrast every 3-6 mo  for 3 y, then H&P and chest CT ± contrast every 6 mo for 2 y, then H&P and a low-dose non-contrast-enhanced chest CT annually.    Given the patient has completed her course of chemotherapy, will be now on adjuvant immune checkpoint inhibitor for a year, will be initiating surveillance that will overlap with the first year of the immune checkpoint immunotherapy.  Patient's last chest imaging was in December 2023.  Discussed with patient that we will proceed with a CT scan of the chest with contrast in August 2024 as 6 months, and we will then repeat in 3 to 6 months, as recommended above.    Once patient completes her course of adjuvant treatment, she will be referred to our  survivorship clinic.    The patient is advised to begin or continue progressive daily aerobic exercise program, follow a low fat, low cholesterol diet, attempt to loose weight, decrease or avoid alcohol intake, have regular screening exams as age/gender appropriate, reduce salt in the diet and cooking, reduce exposure to stress, improve dietary compliance and continue current medications.  Information for the [facility] and programs to support the above recommendations was provided to the patient.     Patient does not have a primary care doctor.  I discussed with patient the importance of establishing care with a primary care doctor for routine medical care, screening and prevention.  Patient will be provided information about her primary care doctors that are located close to her home.    MDM high risk.  No orders of the defined types were placed in this encounter.      Results From Past 48 Hours:  Recent Results (from the past 48 hour(s))   CBC W Differential W Platelet    Collection Time: 10/04/24 11:50 AM   Result Value Ref Range    WBC 6.5 4.0 - 11.0 x10(3) uL    RBC 4.53 3.80 - 5.30 x10(6)uL    HGB 14.2 12.0 - 16.0 g/dL    HCT 42.1 35.0 - 48.0 %    MCV 92.9 80.0 - 100.0 fL    MCH 31.3 26.0 - 34.0 pg    MCHC 33.7 31.0 - 37.0 g/dL    RDW-SD 46.3 35.1 - 46.3 fL    RDW 13.6 11.0 - 15.0 %    .0 150.0 - 450.0 10(3)uL    Neutrophil Absolute Prelim 3.65 1.50 - 7.70 x10 (3) uL    Neutrophil Absolute 3.65 1.50 - 7.70 x10(3) uL    Lymphocyte Absolute 2.33 1.00 - 4.00 x10(3) uL    Monocyte Absolute 0.32 0.10 - 1.00 x10(3) uL    Eosinophil Absolute 0.08 0.00 - 0.70 x10(3) uL    Basophil Absolute 0.08 0.00 - 0.20 x10(3) uL    Immature Granulocyte Absolute 0.02 0.00 - 1.00 x10(3) uL    Neutrophil % 56.4 %    Lymphocyte % 36.0 %    Monocyte % 4.9 %    Eosinophil % 1.2 %    Basophil % 1.2 %    Immature Granulocyte % 0.3 %   Comp Metabolic Panel (14)    Collection Time: 10/04/24 11:50 AM   Result Value Ref Range     Glucose 97 70 - 99 mg/dL    Sodium 137 136 - 145 mmol/L    Potassium      Chloride 106 98 - 112 mmol/L    CO2 24.0 21.0 - 32.0 mmol/L    Anion Gap 7 0 - 18 mmol/L    BUN      Creatinine 1.12 (H) 0.55 - 1.02 mg/dL    BUN/CREA Ratio      Calcium, Total 9.4 8.7 - 10.4 mg/dL    eGFR-Cr 58 (L) >=60 mL/min/1.73m2    ALT      AST      Alkaline Phosphatase 82 41 - 108 U/L    Bilirubin, Total 0.5 0.3 - 1.2 mg/dL    Total Protein 8.1 5.7 - 8.2 g/dL    Albumin      Globulin       A/G Ratio      Patient Fasting for CMP? Patient not present        Imaging & Referrals:  None   No orders of the defined types were placed in this encounter.

## 2024-10-07 ENCOUNTER — TELEPHONE (OUTPATIENT)
Dept: ENDOCRINOLOGY CLINIC | Facility: CLINIC | Age: 54
End: 2024-10-07

## 2024-10-07 NOTE — TELEPHONE ENCOUNTER
Pt has a referral from cancer center to see Endo. Per workflow patient is due to be seen within 1-2 weeks. Please advice as to where I could offer patient an appt.     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

## 2024-10-16 ENCOUNTER — OFFICE VISIT (OUTPATIENT)
Dept: ENDOCRINOLOGY CLINIC | Facility: CLINIC | Age: 54
End: 2024-10-16
Payer: COMMERCIAL

## 2024-10-16 VITALS
BODY MASS INDEX: 31.64 KG/M2 | HEIGHT: 70 IN | DIASTOLIC BLOOD PRESSURE: 60 MMHG | SYSTOLIC BLOOD PRESSURE: 115 MMHG | HEART RATE: 72 BPM | WEIGHT: 221 LBS

## 2024-10-16 DIAGNOSIS — E04.9 GOITER: ICD-10-CM

## 2024-10-16 DIAGNOSIS — E03.2 HYPOTHYROIDISM DUE TO MEDICATION: Primary | ICD-10-CM

## 2024-10-16 PROCEDURE — 99204 OFFICE O/P NEW MOD 45 MIN: CPT | Performed by: INTERNAL MEDICINE

## 2024-10-16 NOTE — H&P
Name: Emy Gibson  Date: 10/16/2024    Referring Physician: No ref. provider found    Chief Complaint   Patient presents with    Consult     R/o thyroid condition last labs done 2024, weight gain        HISTORY OF PRESENT ILLNESS   Emy Gibson is a 54 year old female who presents for   Chief Complaint   Patient presents with    Consult     R/o thyroid condition last labs done 2024, weight gain      This is a 54 year-old woman here for evaluation and management of abnormal thyroid function tests that were discovered in 2024. She is concerned about weight gain.     Medical History:   Past Medical History:    Adenocarcinoma of lung, right (HCC)    T2aN2    Cancer (HCC)    H/O: hysterectomy    Shortness of breath       Surgical History:   Past Surgical History:   Procedure Laterality Date    Hysteroscopy      Lung lobectomy Right 2024    VATS upper lobectomy by Dr. Fong    Tonsillectomy         Family History:  Family History   Problem Relation Age of Onset    Hypertension Mother     Cancer Mother     Lung Disorder Mother     Cancer Paternal Grandfather        Social History:   Social History     Socioeconomic History    Marital status:    Tobacco Use    Smoking status: Former     Current packs/day: 0.00     Types: Cigarettes     Quit date: 2024     Years since quittin.7    Smokeless tobacco: Never   Vaping Use    Vaping status: Never Used   Substance and Sexual Activity    Alcohol use: Not Currently    Drug use: Not Currently     Social Drivers of Health     Food Insecurity: No Food Insecurity (2024)    Food Insecurity     Food Insecurity: Never true   Transportation Needs: No Transportation Needs (2024)    Transportation Needs     Lack of Transportation: No   Housing Stability: Low Risk  (2024)    Housing Stability     Housing Instability: No       Medications:     Current Outpatient Medications:     LORazepam 0.5 MG Oral Tab, Take 1 tablet (0.5 mg total)  by mouth nightly as needed for Anxiety., Disp: 30 tablet, Rfl: 0    albuterol 108 (90 Base) MCG/ACT Inhalation Aero Soln, Inhale 2 puffs into the lungs every 6 (six) hours as needed for Wheezing., Disp: 8.5 g, Rfl: 5    meclizine 25 MG Oral Tab, Take 1 tablet (25 mg total) by mouth 3 (three) times daily as needed., Disp: 21 tablet, Rfl: 0     Allergies:   Allergies[1]    REVIEW OF SYSTEMS  Eyes: no change in vision  Neurologic: no headache, generalized or focal weakness or numbness.  Head: normal  ENT: normal  Lungs: no shortness of breath, wheezing or HANNON  Cardiovascular:  no chest pain or palpitations  Gastrointestinal:  no abdominal pain, bowel movement problems  Musculoskeletal: no muscle pain or arthralgia  /Gyne: no frequency or discomfort while urinating  Psychiatric:  no acute distress, anxiety  or depression  Skin: normal moisturized skin    PHYSICAL EXAMINATION:  /60   Pulse 72   Ht 5' 10\" (1.778 m)   Wt 221 lb (100.2 kg)   BMI 31.71 kg/m²     General Appearance:  Alert, in no acute distress, well developed  Eyes: normal conjunctivae, sclera.  Ears/Nose/Mouth/Throat/Neck:  normal hearing, normal speech and enlarged thyroid gland  Psychiatric:  oriented to time, self, and place  Nutritional:  no abnormal weight gain or loss    Labs:  Date  TSH  FT4 FT3  LT4  05/24/24  2.025  08/23/24  24.587  0.7    Imaging:  Nothing pertinent    ASSESSMENT/PLAN: -This is a 54 year-old female patient with a less than 1 year history of hypothyroidism most likely from immunotherapy.     - We discussed the diagnosis of hypothyroidism.  - We discussed the nonspecific nature of the symptoms of thyroid disease.  - We discussed that occasionally we require optimization of thyroid levels to TSH 1.0  - We also discussed that if symptoms do not improve on optimized levels then we can always consider combination treatment with T4 and T3  - I reminded the patient about the proper way in which to take the medication (an hour  before any medications or food and that they may take two the next day if they forget to take a dose today) and they acknowledged understanding.    - Check Thyroid US  - Check TSH and FT4 as well as thyroid Abs  - Check TSH and FT4 every 6 weeks  - We will decide upon management based upon these     Prior to this encounter, I spent over 20 minutes with preparing for the visit, reviewing documents from other specialties as well as from PCP, and going over test results and imaging studies. During the face to face encounter, I spent an additional 30 minutes which were determined for history-taking, physical examination, and orientation regarding our services. Greater than 50% of the time was spent in counseling, anticipatory guidance, and coordination of care. Patient concerns were answered to the best of my knowledge.       10/16/2024  Jenny Maloney MD         [1]   Allergies  Allergen Reactions    Singulair [Montelukast] SWELLING    Pollen OTHER (SEE COMMENTS)     Nasal congestion, runny nose

## 2024-10-25 ENCOUNTER — APPOINTMENT (OUTPATIENT)
Dept: HEMATOLOGY/ONCOLOGY | Facility: HOSPITAL | Age: 54
End: 2024-10-25
Attending: INTERNAL MEDICINE
Payer: COMMERCIAL

## 2024-11-14 PROBLEM — E04.9 GOITER: Status: ACTIVE | Noted: 2024-11-14

## 2024-11-14 PROBLEM — E03.2 HYPOTHYROIDISM DUE TO MEDICATION: Status: ACTIVE | Noted: 2024-11-14

## 2024-11-15 ENCOUNTER — NURSE ONLY (OUTPATIENT)
Dept: HEMATOLOGY/ONCOLOGY | Facility: HOSPITAL | Age: 54
End: 2024-11-15
Attending: INTERNAL MEDICINE
Payer: COMMERCIAL

## 2024-11-15 VITALS
BODY MASS INDEX: 31.07 KG/M2 | SYSTOLIC BLOOD PRESSURE: 111 MMHG | DIASTOLIC BLOOD PRESSURE: 68 MMHG | WEIGHT: 217 LBS | HEIGHT: 70 IN | RESPIRATION RATE: 16 BRPM | HEART RATE: 81 BPM | OXYGEN SATURATION: 97 % | TEMPERATURE: 98 F

## 2024-11-15 DIAGNOSIS — E03.2 HYPOTHYROIDISM DUE TO MEDICATION: ICD-10-CM

## 2024-11-15 DIAGNOSIS — C34.11 PRIMARY CANCER OF RIGHT UPPER LOBE OF LUNG (HCC): Primary | ICD-10-CM

## 2024-11-15 DIAGNOSIS — Z51.12 ENCOUNTER FOR ANTINEOPLASTIC IMMUNOTHERAPY: ICD-10-CM

## 2024-11-15 LAB
ALBUMIN SERPL-MCNC: 4.9 G/DL (ref 3.2–4.8)
ALBUMIN/GLOB SERPL: 1.7 {RATIO} (ref 1–2)
ALP LIVER SERPL-CCNC: 81 U/L
ALT SERPL-CCNC: 20 U/L
ANION GAP SERPL CALC-SCNC: 5 MMOL/L (ref 0–18)
AST SERPL-CCNC: 23 U/L (ref ?–34)
BASOPHILS # BLD AUTO: 0.05 X10(3) UL (ref 0–0.2)
BASOPHILS NFR BLD AUTO: 1 %
BILIRUB SERPL-MCNC: 0.5 MG/DL (ref 0.3–1.2)
BUN BLD-MCNC: 10 MG/DL (ref 9–23)
BUN/CREAT SERPL: 8.9 (ref 10–20)
CALCIUM BLD-MCNC: 10.3 MG/DL (ref 8.7–10.4)
CHLORIDE SERPL-SCNC: 109 MMOL/L (ref 98–112)
CO2 SERPL-SCNC: 28 MMOL/L (ref 21–32)
CREAT BLD-MCNC: 1.12 MG/DL
DEPRECATED RDW RBC AUTO: 49.7 FL (ref 35.1–46.3)
EGFRCR SERPLBLD CKD-EPI 2021: 58 ML/MIN/1.73M2 (ref 60–?)
EOSINOPHIL # BLD AUTO: 0.07 X10(3) UL (ref 0–0.7)
EOSINOPHIL NFR BLD AUTO: 1.3 %
ERYTHROCYTE [DISTWIDTH] IN BLOOD BY AUTOMATED COUNT: 14.1 % (ref 11–15)
GLOBULIN PLAS-MCNC: 2.9 G/DL (ref 2–3.5)
GLUCOSE BLD-MCNC: 103 MG/DL (ref 70–99)
HCT VFR BLD AUTO: 37 %
HGB BLD-MCNC: 12.7 G/DL
IMM GRANULOCYTES # BLD AUTO: 0.01 X10(3) UL (ref 0–1)
IMM GRANULOCYTES NFR BLD: 0.2 %
LYMPHOCYTES # BLD AUTO: 1.71 X10(3) UL (ref 1–4)
LYMPHOCYTES NFR BLD AUTO: 32.7 %
MCH RBC QN AUTO: 32.7 PG (ref 26–34)
MCHC RBC AUTO-ENTMCNC: 34.3 G/DL (ref 31–37)
MCV RBC AUTO: 95.4 FL
MONOCYTES # BLD AUTO: 0.32 X10(3) UL (ref 0.1–1)
MONOCYTES NFR BLD AUTO: 6.1 %
NEUTROPHILS # BLD AUTO: 3.07 X10 (3) UL (ref 1.5–7.7)
NEUTROPHILS # BLD AUTO: 3.07 X10(3) UL (ref 1.5–7.7)
NEUTROPHILS NFR BLD AUTO: 58.7 %
OSMOLALITY SERPL CALC.SUM OF ELEC: 293 MOSM/KG (ref 275–295)
PLATELET # BLD AUTO: 227 10(3)UL (ref 150–450)
POTASSIUM SERPL-SCNC: 4.8 MMOL/L (ref 3.5–5.1)
PROT SERPL-MCNC: 7.8 G/DL (ref 5.7–8.2)
RBC # BLD AUTO: 3.88 X10(6)UL
SODIUM SERPL-SCNC: 142 MMOL/L (ref 136–145)
T4 FREE SERPL-MCNC: 0.6 NG/DL (ref 0.8–1.7)
THYROGLOB SERPL-MCNC: 15 U/ML (ref ?–60)
THYROPEROXIDASE AB SERPL-ACNC: 29 U/ML (ref ?–60)
TSI SER-ACNC: 49.58 UIU/ML (ref 0.55–4.78)
WBC # BLD AUTO: 5.2 X10(3) UL (ref 4–11)

## 2024-11-15 PROCEDURE — 85025 COMPLETE CBC W/AUTO DIFF WBC: CPT

## 2024-11-15 PROCEDURE — 86376 MICROSOMAL ANTIBODY EACH: CPT

## 2024-11-15 PROCEDURE — 86800 THYROGLOBULIN ANTIBODY: CPT

## 2024-11-15 PROCEDURE — 84439 ASSAY OF FREE THYROXINE: CPT

## 2024-11-15 PROCEDURE — 36415 COLL VENOUS BLD VENIPUNCTURE: CPT

## 2024-11-15 PROCEDURE — 84443 ASSAY THYROID STIM HORMONE: CPT

## 2024-11-15 PROCEDURE — 99215 OFFICE O/P EST HI 40 MIN: CPT | Performed by: NURSE PRACTITIONER

## 2024-11-15 PROCEDURE — 80053 COMPREHEN METABOLIC PANEL: CPT

## 2024-11-15 NOTE — PROGRESS NOTES
HPI   Emy Gibson is a 54 year old female here for follow up of Primary cancer of right upper lobe of lung (HCC)    Encounter for antineoplastic immunotherapy.    She s/p of cycle 4 of pemetrexed/cisplatin on 4/26/24.    Currently s/p cycle 4 pembrolizumab single agent.     Fatigue:  Yes, states worse with  more cycles.  States lots of stressors in the family may contribute to fatigue, not sleeping as well.      Fevers:  No    Appetite/taste changes:  Yes, taste and appetite changes , states thinks from having quit smoking.    Mucositis:  No    Weight changes:  Yes, gained.    Nausea/vomiting:  No    Diarrhea:  No    Constipation:  No    Peripheral neuropathy:  Yes, since last visit intermittent tingling of the toes.      Numbness in the R axilla and in the upper chest.  Pain since surgery.  No complaints today .         ECOG PS 1, fatigue     Oncology History   Primary cancer of right upper lobe of lung (HCC)   2/14/2024 Cancer Staged    Staging form: Lung, AJCC 8th Edition  - Pathologic stage from 2/14/2024: Stage IIIA (pT2a, pN2, cM0)     2/15/2024 Initial Diagnosis    Adenocarcinoma of right lung (HCC)     2/23/2024 - 4/26/2024 Chemotherapy    OP Pemetrexed / CISplatin  Plan Provider: Igor Mccurdy MD  Treatment goal: Curative  Line of treatment: [No plan line of treatment]     5/29/2024 -  Chemotherapy    OP lung cancer Pembrolizumab  Plan Provider: Stanislaw Maier MD  Treatment goal: Curative- Adjuvant  Line of treatment: [No plan line of treatment]         Review of Systems:     Review of Systems   Constitutional:  Positive for fatigue.   HENT:   Positive for tinnitus (has gotten better with massage or tapping behind the ears.).         Sinus pressure last week   Respiratory:  Negative for cough and shortness of breath.    Cardiovascular:  Positive for chest pain (post thoracotomy). Negative for leg swelling.   Gastrointestinal:  Negative for abdominal pain.   Genitourinary:  Negative for dysuria and  frequency.    Musculoskeletal:  Positive for arthralgias (R hip with walking chronic) and back pain (when laying down has pain on the LLE that is lateral and burning, states prior to dx of ca.). Negative for neck pain.   Skin:  Negative for itching and rash.   Neurological:  Positive for dizziness (if gets up too fast.) and numbness (nerve pain arm post thoracotomy, easing up.). Negative for headaches.   Hematological:  Negative for adenopathy. Does not bruise/bleed easily.   Psychiatric/Behavioral:  Positive for sleep disturbance.          Current Outpatient Medications   Medication Sig Dispense Refill    LORazepam 0.5 MG Oral Tab Take 1 tablet (0.5 mg total) by mouth nightly as needed for Anxiety. 30 tablet 0    albuterol 108 (90 Base) MCG/ACT Inhalation Aero Soln Inhale 2 puffs into the lungs every 6 (six) hours as needed for Wheezing. 8.5 g 5    meclizine 25 MG Oral Tab Take 1 tablet (25 mg total) by mouth 3 (three) times daily as needed. 21 tablet 0     Allergies:   Allergies   Allergen Reactions    Singulair [Montelukast] SWELLING    Pollen OTHER (SEE COMMENTS)     Nasal congestion, runny nose       Past Medical History:    Adenocarcinoma of lung, right (HCC)    T2aN2    Cancer (HCC)    H/O: hysterectomy    Shortness of breath     Past Surgical History:   Procedure Laterality Date    Hysteroscopy      Lung lobectomy Right 2024    VATS upper lobectomy by Dr. Fong    Tonsillectomy       Social History     Socioeconomic History    Marital status:      Spouse name: Not on file    Number of children: Not on file    Years of education: Not on file    Highest education level: Not on file   Occupational History    Not on file   Tobacco Use    Smoking status: Former     Current packs/day: 0.00     Types: Cigarettes     Quit date: 2024     Years since quittin.8    Smokeless tobacco: Never   Vaping Use    Vaping status: Never Used   Substance and Sexual Activity    Alcohol use: Not Currently     Drug use: Not Currently    Sexual activity: Not on file   Other Topics Concern    Not on file   Social History Narrative    Not on file     Social Drivers of Health     Financial Resource Strain: Not on file   Food Insecurity: No Food Insecurity (2/8/2024)    Food Insecurity     Food Insecurity: Never true   Transportation Needs: No Transportation Needs (2/8/2024)    Transportation Needs     Lack of Transportation: No   Physical Activity: Not on file   Stress: Not on file   Social Connections: Not on file   Housing Stability: Low Risk  (2/8/2024)    Housing Stability     Housing Instability: No     Housing Instability Emergency: Not on file     Crib or Bassinette: Not on file     Family History   Problem Relation Age of Onset    Hypertension Mother     Cancer Mother     Lung Disorder Mother     Cancer Paternal Grandfather          PHYSICAL EXAM:    /68 (BP Location: Right arm, Patient Position: Sitting, Cuff Size: large)   Pulse 81   Temp 98.3 °F (36.8 °C) (Oral)   Resp 16   Ht 1.778 m (5' 10\")   Wt 98.4 kg (217 lb)   SpO2 97%   BMI 31.14 kg/m²   Wt Readings from Last 6 Encounters:   10/16/24 100.2 kg (221 lb)   10/04/24 98.4 kg (217 lb)   08/23/24 95.3 kg (210 lb)   07/12/24 92 kg (202 lb 12.8 oz)   05/29/24 93.2 kg (205 lb 6.4 oz)   05/24/24 92.5 kg (204 lb)     Physical Exam  General: Patient is alert, not in acute distress.  HEENT: EOMs intact. PERRL.    Neck: No JVD. No palpable lymphadenopathy. Neck is supple.  Chest: Clear to auscultation.  Heart: Regular rate and rhythm.   Abdomen: Soft, non tender with good bowel sounds.  Extremities: No edema.  Neurological: Grossly intact.   Lymphatics: There is no palpable lymphadenopathy throughout in the cervical, supraclavicular, axillary, or inguinal regions.  Psych/Depression: nl        ASSESSMENT/PLAN:     1. Primary cancer of right upper lobe of lung (HCC)    2. Encounter for antineoplastic immunotherapy       Cancer Staging   Primary cancer of right  upper lobe of lung (HCC)  Staging form: Lung, AJCC 8th Edition  - Pathologic stage from 2/14/2024: Stage IIIA (pT2a, pN2, cM0) - Signed by Stanislaw Maier MD on 5/24/2024    Patient initially diagnosed with invasive adenocarcinoma of the right upper lobe in December 2023.  She then underwent a right upper lobectomy with mediastinal lymph node dissection on 2/14/2024.  Staging as above.  This was ALK and EGFR negative and PD-L1 40%.    Patient has completed 4 cycles of adjuvant chemotherapy with cisplatin and pembrolizumab, course of treatment from 2/23/2024 through 4/26/2024.    Since the patient is PD-L1 positive, we will proceed with adjuvant pembrolizumab 400 mg every 6 weeks for up to 1 year.      S/p cycle 4  pembrolizumab every 6 weeks     Surveillance CT in August of 2024 JENNIFER.  Next due in February of 2025.        Noted elevated TSH - 49.580 pt asymptomatic     HOLD cycle 5    Scheduled for 6 weeks from now Tomas 3     Pt has not started levothyroxine- no prescription     Hypothyroidism from immunotherapy:  Endocrine consultation for management.Forward labs to Dr Maloney-- pt is NOT on levothyroxine - no prescription. Pt is asymptomatic     Insomnia:  lorazepam 0.5 mg po at bedtime prn       FMLA needs to be extended   Inrtermittent to cover thru May 2025 (end of therapy)    Pt concerned re time off and ability to complete therapy due to limited FMLA     Discussed with the patient NCCN guidelines for surveillance which recommend H&P and chest CT ± contrast every 3-6 mo  for 3 y, then H&P and chest CT ± contrast every 6 mo for 2 y, then H&P and a low-dose non-contrast-enhanced chest CT annually.    Given the patient has completed her course of chemotherapy, will be now on adjuvant immune checkpoint inhibitor for a year, will be initiating surveillance that will overlap with the first year of the immune checkpoint immunotherapy.  Patient's last chest imaging was in December 2023.  Discussed with patient that  we will proceed with a CT scan of the chest with contrast in August 2024 as 6 months, and we will then repeat in 3 to 6 months, as recommended above.    Once patient completes her course of adjuvant treatment, she will be referred to our survivorship clinic.    The patient is advised to begin or continue progressive daily aerobic exercise program, follow a low fat, low cholesterol diet, attempt to loose weight, decrease or avoid alcohol intake, have regular screening exams as age/gender appropriate, reduce salt in the diet and cooking, reduce exposure to stress, improve dietary compliance and continue current medications.  Information for [facility] and programs to support the above recommendations was provided to the patient.     Patient does not have a primary care doctor.  I discussed with patient the importance of establishing care with a primary care doctor for routine medical care, screening and prevention.  Patient will be provided information about her primary care doctors that are located close to her home.    MDM high risk.  No orders of the defined types were placed in this encounter.      Results From Past 48 Hours:  Recent Results (from the past 48 hours)   CBC W Differential W Platelet    Collection Time: 11/15/24 10:16 AM   Result Value Ref Range    WBC 5.2 4.0 - 11.0 x10(3) uL    RBC 3.88 3.80 - 5.30 x10(6)uL    HGB 12.7 12.0 - 16.0 g/dL    HCT 37.0 35.0 - 48.0 %    MCV 95.4 80.0 - 100.0 fL    MCH 32.7 26.0 - 34.0 pg    MCHC 34.3 31.0 - 37.0 g/dL    RDW-SD 49.7 (H) 35.1 - 46.3 fL    RDW 14.1 11.0 - 15.0 %    .0 150.0 - 450.0 10(3)uL    Neutrophil Absolute Prelim 3.07 1.50 - 7.70 x10 (3) uL    Neutrophil Absolute 3.07 1.50 - 7.70 x10(3) uL    Lymphocyte Absolute 1.71 1.00 - 4.00 x10(3) uL    Monocyte Absolute 0.32 0.10 - 1.00 x10(3) uL    Eosinophil Absolute 0.07 0.00 - 0.70 x10(3) uL    Basophil Absolute 0.05 0.00 - 0.20 x10(3) uL    Immature Granulocyte Absolute 0.01 0.00 - 1.00  x10(3) uL    Neutrophil % 58.7 %    Lymphocyte % 32.7 %    Monocyte % 6.1 %    Eosinophil % 1.3 %    Basophil % 1.0 %    Immature Granulocyte % 0.2 %   Comp Metabolic Panel (14)    Collection Time: 11/15/24 10:16 AM   Result Value Ref Range    Glucose 103 (H) 70 - 99 mg/dL    Sodium 142 136 - 145 mmol/L    Potassium 4.8 3.5 - 5.1 mmol/L    Chloride 109 98 - 112 mmol/L    CO2 28.0 21.0 - 32.0 mmol/L    Anion Gap 5 0 - 18 mmol/L    BUN 10 9 - 23 mg/dL    Creatinine 1.12 (H) 0.55 - 1.02 mg/dL    BUN/CREA Ratio 8.9 (L) 10.0 - 20.0    Calcium, Total 10.3 8.7 - 10.4 mg/dL    Calculated Osmolality 293 275 - 295 mOsm/kg    eGFR-Cr 58 (L) >=60 mL/min/1.73m2    ALT 20 10 - 49 U/L    AST 23 <34 U/L    Alkaline Phosphatase 81 41 - 108 U/L    Bilirubin, Total 0.5 0.3 - 1.2 mg/dL    Total Protein 7.8 5.7 - 8.2 g/dL    Albumin 4.9 (H) 3.2 - 4.8 g/dL    Globulin  2.9 2.0 - 3.5 g/dL    A/G Ratio 1.7 1.0 - 2.0    Patient Fasting for CMP? Patient not present    TSH [E]    Collection Time: 11/15/24 10:16 AM   Result Value Ref Range    TSH 49.580 (H) 0.550 - 4.780 uIU/mL   T4 FREE [E]    Collection Time: 11/15/24 10:16 AM   Result Value Ref Range    Free T4 0.6 (L) 0.8 - 1.7 ng/dL         Imaging & Referrals:  None   No orders of the defined types were placed in this encounter.

## 2024-11-18 ENCOUNTER — TELEPHONE (OUTPATIENT)
Dept: ENDOCRINOLOGY CLINIC | Facility: CLINIC | Age: 54
End: 2024-11-18

## 2024-11-18 DIAGNOSIS — E03.2 HYPOTHYROIDISM DUE TO MEDICATION: Primary | ICD-10-CM

## 2024-11-18 RX ORDER — LEVOTHYROXINE SODIUM 50 UG/1
50 TABLET ORAL
Qty: 90 TABLET | Refills: 0 | Status: SHIPPED | OUTPATIENT
Start: 2024-11-18

## 2024-11-18 NOTE — TELEPHONE ENCOUNTER
Hi!    So, I have placed TSH and FT4 in the system for her to have completed every 6 weeks. She had told me that she was going to have this done very close to when she saw me last. I was waiting for that to start her on medication. The assay for the TSH with FT4 is different than for TSH with reflex FT4.     I will start her levothyroxine now, but in the future, please make sure that the TSH and FT4 that I have ordered is drawn. I will be starting her on Unithroid 50mcg PO qday. She should have repeat blood tests in 6 weeks.     She may obtain the thyroid US at any time. Thank you!

## 2024-11-18 NOTE — TELEPHONE ENCOUNTER
----- Message from Maricruz Rosado sent at 11/15/2024 11:50 AM CST -----  Hi Dr Maloney,   I saw Emy today. She is receiving immunotherapy here with us under Dr Maier. You saw her for hypothyroid. Today her TSH is 49  She states she never started levothryoxine because she did not get a prescription.   Also she wants to know how soon you want her to have US thyroid    Thanks  Maricruz MCMILLAN

## 2024-11-22 NOTE — TELEPHONE ENCOUNTER
Prescription sent to [facility] on 11/18.     Called pharmacy. States that they did not have patient pharmacy benefits on file. Provided pharmacy benefits and prescription is going through for $8 copay, 90 day supply.     Prescription will not be available until Tuesday at the latest. Patient notified and advised to call other pharmacies to see if she can get her prescription sooner.

## 2025-01-03 ENCOUNTER — OFFICE VISIT (OUTPATIENT)
Age: 55
End: 2025-01-03
Attending: INTERNAL MEDICINE
Payer: COMMERCIAL

## 2025-01-03 ENCOUNTER — NURSE ONLY (OUTPATIENT)
Age: 55
End: 2025-01-03
Attending: INTERNAL MEDICINE
Payer: COMMERCIAL

## 2025-01-03 VITALS
WEIGHT: 212 LBS | DIASTOLIC BLOOD PRESSURE: 67 MMHG | RESPIRATION RATE: 16 BRPM | TEMPERATURE: 98 F | HEART RATE: 70 BPM | OXYGEN SATURATION: 98 % | HEIGHT: 70 IN | BODY MASS INDEX: 30.35 KG/M2 | SYSTOLIC BLOOD PRESSURE: 110 MMHG

## 2025-01-03 DIAGNOSIS — C34.11 PRIMARY CANCER OF RIGHT UPPER LOBE OF LUNG (HCC): Primary | ICD-10-CM

## 2025-01-03 DIAGNOSIS — G47.01 INSOMNIA DUE TO MEDICAL CONDITION: ICD-10-CM

## 2025-01-03 DIAGNOSIS — Z51.12 ENCOUNTER FOR ANTINEOPLASTIC IMMUNOTHERAPY: ICD-10-CM

## 2025-01-03 LAB
ALBUMIN SERPL-MCNC: 4.8 G/DL (ref 3.2–4.8)
ALBUMIN/GLOB SERPL: 1.8 {RATIO} (ref 1–2)
ALP LIVER SERPL-CCNC: 85 U/L
ALT SERPL-CCNC: 28 U/L
ANION GAP SERPL CALC-SCNC: 6 MMOL/L (ref 0–18)
AST SERPL-CCNC: 27 U/L (ref ?–34)
BASOPHILS # BLD AUTO: 0.06 X10(3) UL (ref 0–0.2)
BASOPHILS NFR BLD AUTO: 1 %
BILIRUB SERPL-MCNC: 0.6 MG/DL (ref 0.3–1.2)
BUN BLD-MCNC: 11 MG/DL (ref 9–23)
BUN/CREAT SERPL: 9.6 (ref 10–20)
CALCIUM BLD-MCNC: 9.8 MG/DL (ref 8.7–10.4)
CHLORIDE SERPL-SCNC: 107 MMOL/L (ref 98–112)
CO2 SERPL-SCNC: 26 MMOL/L (ref 21–32)
CREAT BLD-MCNC: 1.14 MG/DL
DEPRECATED RDW RBC AUTO: 46.5 FL (ref 35.1–46.3)
EGFRCR SERPLBLD CKD-EPI 2021: 57 ML/MIN/1.73M2 (ref 60–?)
EOSINOPHIL # BLD AUTO: 0.08 X10(3) UL (ref 0–0.7)
EOSINOPHIL NFR BLD AUTO: 1.3 %
ERYTHROCYTE [DISTWIDTH] IN BLOOD BY AUTOMATED COUNT: 13 % (ref 11–15)
GLOBULIN PLAS-MCNC: 2.6 G/DL (ref 2–3.5)
GLUCOSE BLD-MCNC: 105 MG/DL (ref 70–99)
HCT VFR BLD AUTO: 37.7 %
HGB BLD-MCNC: 13 G/DL
IMM GRANULOCYTES # BLD AUTO: 0.02 X10(3) UL (ref 0–1)
IMM GRANULOCYTES NFR BLD: 0.3 %
LYMPHOCYTES # BLD AUTO: 1.42 X10(3) UL (ref 1–4)
LYMPHOCYTES NFR BLD AUTO: 23.9 %
MCH RBC QN AUTO: 33.6 PG (ref 26–34)
MCHC RBC AUTO-ENTMCNC: 34.5 G/DL (ref 31–37)
MCV RBC AUTO: 97.4 FL
MONOCYTES # BLD AUTO: 0.43 X10(3) UL (ref 0.1–1)
MONOCYTES NFR BLD AUTO: 7.2 %
NEUTROPHILS # BLD AUTO: 3.94 X10 (3) UL (ref 1.5–7.7)
NEUTROPHILS # BLD AUTO: 3.94 X10(3) UL (ref 1.5–7.7)
NEUTROPHILS NFR BLD AUTO: 66.3 %
OSMOLALITY SERPL CALC.SUM OF ELEC: 288 MOSM/KG (ref 275–295)
PLATELET # BLD AUTO: 244 10(3)UL (ref 150–450)
POTASSIUM SERPL-SCNC: 4.9 MMOL/L (ref 3.5–5.1)
PROT SERPL-MCNC: 7.4 G/DL (ref 5.7–8.2)
RBC # BLD AUTO: 3.87 X10(6)UL
SODIUM SERPL-SCNC: 139 MMOL/L (ref 136–145)
T4 FREE SERPL-MCNC: 1 NG/DL (ref 0.8–1.7)
TSI SER-ACNC: 7.56 UIU/ML (ref 0.55–4.78)
WBC # BLD AUTO: 6 X10(3) UL (ref 4–11)

## 2025-01-03 RX ORDER — LORAZEPAM 0.5 MG/1
0.5 TABLET ORAL NIGHTLY PRN
Qty: 30 TABLET | Refills: 0 | Status: SHIPPED | OUTPATIENT
Start: 2025-01-03

## 2025-01-03 NOTE — PROGRESS NOTES
HPI   Emy Gibson is a 54 year old female here for follow up of Primary cancer of right upper lobe of lung (HCC)    Encounter for antineoplastic immunotherapy.    She s/p of cycle 4 of pemetrexed/cisplatin on 4/26/24.    Currently s/p cycle 4 pembrolizumab single agent.     Fatigue:  Yes, states worse with  more cycles.  States lots of stressors in the family may contribute to fatigue, not sleeping as well.      Fevers:  No    Appetite/taste changes:  Yes, taste and appetite changes , states thinks from having quit smoking.    Mucositis:  No    Weight changes:  Yes, gained.    Nausea/vomiting:  No    Diarrhea:  No    Constipation:  No    Peripheral neuropathy:  Yes, since last visit intermittent tingling of the toes.      Numbness in the R axilla and in the upper chest.  Pain since surgery.  No complaints today .         ECOG PS 1, fatigue     Oncology History   Primary cancer of right upper lobe of lung (HCC)   2/14/2024 Cancer Staged    Staging form: Lung, AJCC 8th Edition  - Pathologic stage from 2/14/2024: Stage IIIA (pT2a, pN2, cM0)     2/15/2024 Initial Diagnosis    Adenocarcinoma of right lung (HCC)     2/23/2024 - 4/26/2024 Chemotherapy    OP Pemetrexed / CISplatin  Plan Provider: Igor Mccurdy MD  Treatment goal: Curative  Line of treatment: [No plan line of treatment]     5/29/2024 -  Chemotherapy    OP lung cancer Pembrolizumab  Plan Provider: Stanislaw Maier MD  Treatment goal: Curative- Adjuvant  Line of treatment: [No plan line of treatment]         Review of Systems:     Review of Systems   Constitutional:  Positive for fatigue.   HENT:   Positive for tinnitus (has gotten better with massage or tapping behind the ears.).         Sinus pressure last week   Respiratory:  Negative for cough and shortness of breath.    Cardiovascular:  Negative for chest pain and leg swelling.   Gastrointestinal:  Negative for abdominal pain.   Genitourinary:  Negative for dysuria and frequency.    Musculoskeletal:   Positive for arthralgias (R hip with walking chronic) and back pain (when laying down has pain on the LLE that is lateral and burning, states prior to dx of ca.). Negative for neck pain.   Skin:  Negative for itching and rash.   Neurological:  Positive for numbness (nerve pain arm post thoracotomy, improved). Negative for dizziness and headaches.   Hematological:  Negative for adenopathy. Does not bruise/bleed easily.   Psychiatric/Behavioral:  Positive for sleep disturbance.          Current Outpatient Medications   Medication Sig Dispense Refill    UNITHROID 50 MCG Oral Tab Take 1 tablet (50 mcg total) by mouth before breakfast. 90 tablet 0    LORazepam 0.5 MG Oral Tab Take 1 tablet (0.5 mg total) by mouth nightly as needed for Anxiety. 30 tablet 0    albuterol 108 (90 Base) MCG/ACT Inhalation Aero Soln Inhale 2 puffs into the lungs every 6 (six) hours as needed for Wheezing. 8.5 g 5    meclizine 25 MG Oral Tab Take 1 tablet (25 mg total) by mouth 3 (three) times daily as needed. 21 tablet 0     Allergies:   Allergies   Allergen Reactions    Singulair [Montelukast] SWELLING    Pollen OTHER (SEE COMMENTS)     Nasal congestion, runny nose       Past Medical History:    Adenocarcinoma of lung, right (HCC)    T2aN2    Cancer (HCC)    H/O: hysterectomy    Shortness of breath     Past Surgical History:   Procedure Laterality Date    Hysteroscopy      Lung lobectomy Right 2024    VATS upper lobectomy by Dr. Fong    Tonsillectomy       Social History     Socioeconomic History    Marital status:      Spouse name: Not on file    Number of children: Not on file    Years of education: Not on file    Highest education level: Not on file   Occupational History    Not on file   Tobacco Use    Smoking status: Former     Current packs/day: 0.00     Types: Cigarettes     Quit date: 2024     Years since quittin.9    Smokeless tobacco: Never   Vaping Use    Vaping status: Never Used   Substance and Sexual  Activity    Alcohol use: Not Currently    Drug use: Not Currently    Sexual activity: Not on file   Other Topics Concern    Not on file   Social History Narrative    Not on file     Social Drivers of Health     Financial Resource Strain: Not on file   Food Insecurity: No Food Insecurity (2/8/2024)    Food Insecurity     Food Insecurity: Never true   Transportation Needs: No Transportation Needs (2/8/2024)    Transportation Needs     Lack of Transportation: No   Physical Activity: Not on file   Stress: Not on file   Social Connections: Not on file   Housing Stability: Low Risk  (2/8/2024)    Housing Stability     Housing Instability: No     Housing Instability Emergency: Not on file     Crib or Bassinette: Not on file     Family History   Problem Relation Age of Onset    Hypertension Mother     Cancer Mother     Lung Disorder Mother     Cancer Paternal Grandfather          PHYSICAL EXAM:    /67 (BP Location: Right arm, Patient Position: Sitting, Cuff Size: large)   Pulse 70   Temp 98.1 °F (36.7 °C) (Oral)   Resp 16   Ht 1.778 m (5' 10\")   Wt 96.2 kg (212 lb)   SpO2 98%   BMI 30.42 kg/m²   Wt Readings from Last 6 Encounters:   11/15/24 98.4 kg (217 lb)   10/16/24 100.2 kg (221 lb)   10/04/24 98.4 kg (217 lb)   08/23/24 95.3 kg (210 lb)   07/12/24 92 kg (202 lb 12.8 oz)   05/29/24 93.2 kg (205 lb 6.4 oz)     Physical Exam  General: Patient is alert, not in acute distress.  HEENT: EOMs intact. PERRL.    Neck: No JVD. No palpable lymphadenopathy. Neck is supple.  Chest: Clear to auscultation.  Heart: Regular rate and rhythm.   Abdomen: Soft, non tender with good bowel sounds.  Extremities: No edema.  Neurological: Grossly intact.   Lymphatics: There is no palpable lymphadenopathy throughout in the cervical, supraclavicular, axillary, or inguinal regions.  Psych/Depression: nl        ASSESSMENT/PLAN:     1. Primary cancer of right upper lobe of lung (HCC)    2. Encounter for antineoplastic immunotherapy        Cancer Staging   Primary cancer of right upper lobe of lung (HCC)  Staging form: Lung, AJCC 8th Edition  - Pathologic stage from 2/14/2024: Stage IIIA (pT2a, pN2, cM0) - Signed by Stanislaw Maier MD on 5/24/2024    Patient initially diagnosed with invasive adenocarcinoma of the right upper lobe in December 2023.  She then underwent a right upper lobectomy with mediastinal lymph node dissection on 2/14/2024.  Staging as above.  This was ALK and EGFR negative and PD-L1 40%.    Patient has completed 4 cycles of adjuvant chemotherapy with cisplatin and pembrolizumab, course of treatment from 2/23/2024 through 4/26/2024.    Since the patient is PD-L1 positive, we will proceed with adjuvant pembrolizumab 400 mg every 6 weeks for up to 1 year.      S/p cycle 4  pembrolizumab every 6 weeks     Surveillance CT in August of 2024 JENNIFER.  Next due in February of 2025. Orders placed         Noted elevated TSH - 49.580 pt asymptomatic Cycle 5 was held.  TSH improved with medication    Proceed cycle 5      Hypothyroidism from immunotherapy:  Endocrine following      Insomnia:  lorazepam 0.5 mg po at bedtime prn - refill sent       FMLA needs to be extended   Inrtermittent to cover thru May 2025 (end of therapy)    Pt concerned re time off and ability to complete therapy due to limited FMLA     Discussed with the patient NCCN guidelines for surveillance which recommend H&P and chest CT ± contrast every 3-6 mo  for 3 y, then H&P and chest CT ± contrast every 6 mo for 2 y, then H&P and a low-dose non-contrast-enhanced chest CT annually.    Given the patient has completed her course of chemotherapy, will be now on adjuvant immune checkpoint inhibitor for a year, will be initiating surveillance that will overlap with the first year of the immune checkpoint immunotherapy.  Patient's last chest imaging was in December 2023.  Discussed with patient that we will proceed with a CT scan of the chest with contrast in August 2024 as 6 months,  and we will then repeat in 3 to 6 months, as recommended above.    Once patient completes her course of adjuvant treatment, she will be referred to our survivorship clinic.    The patient is advised to begin or continue progressive daily aerobic exercise program, follow a low fat, low cholesterol diet, attempt to loose weight, decrease or avoid alcohol intake, have regular screening exams as age/gender appropriate, reduce salt in the diet and cooking, reduce exposure to stress, improve dietary compliance and continue current medications.  Information for the [facility] and programs to support the above recommendations was provided to the patient.     Patient does not have a primary care doctor.  I discussed with patient the importance of establishing care with a primary care doctor for routine medical care, screening and prevention.  Patient will be provided information about her primary care doctors that are located close to her home.    MDM high risk.  No orders of the defined types were placed in this encounter.      Results From Past 48 Hours:  Recent Results (from the past 48 hours)   CBC W Differential W Platelet    Collection Time: 01/03/25  9:12 AM   Result Value Ref Range    WBC 6.0 4.0 - 11.0 x10(3) uL    RBC 3.87 3.80 - 5.30 x10(6)uL    HGB 13.0 12.0 - 16.0 g/dL    HCT 37.7 35.0 - 48.0 %    MCV 97.4 80.0 - 100.0 fL    MCH 33.6 26.0 - 34.0 pg    MCHC 34.5 31.0 - 37.0 g/dL    RDW-SD 46.5 (H) 35.1 - 46.3 fL    RDW 13.0 11.0 - 15.0 %    .0 150.0 - 450.0 10(3)uL    Neutrophil Absolute Prelim 3.94 1.50 - 7.70 x10 (3) uL    Neutrophil Absolute 3.94 1.50 - 7.70 x10(3) uL    Lymphocyte Absolute 1.42 1.00 - 4.00 x10(3) uL    Monocyte Absolute 0.43 0.10 - 1.00 x10(3) uL    Eosinophil Absolute 0.08 0.00 - 0.70 x10(3) uL    Basophil Absolute 0.06 0.00 - 0.20 x10(3) uL    Immature Granulocyte Absolute 0.02 0.00 - 1.00 x10(3) uL    Neutrophil % 66.3 %    Lymphocyte % 23.9 %    Monocyte % 7.2 %    Eosinophil %  1.3 %    Basophil % 1.0 %    Immature Granulocyte % 0.3 %   Comp Metabolic Panel (14)    Collection Time: 01/03/25  9:12 AM   Result Value Ref Range    Glucose 105 (H) 70 - 99 mg/dL    Sodium 139 136 - 145 mmol/L    Potassium 4.9 3.5 - 5.1 mmol/L    Chloride 107 98 - 112 mmol/L    CO2 26.0 21.0 - 32.0 mmol/L    Anion Gap 6 0 - 18 mmol/L    BUN 11 9 - 23 mg/dL    Creatinine 1.14 (H) 0.55 - 1.02 mg/dL    BUN/CREA Ratio 9.6 (L) 10.0 - 20.0    Calcium, Total 9.8 8.7 - 10.4 mg/dL    Calculated Osmolality 288 275 - 295 mOsm/kg    eGFR-Cr 57 (L) >=60 mL/min/1.73m2    ALT 28 10 - 49 U/L    AST 27 <34 U/L    Alkaline Phosphatase 85 41 - 108 U/L    Bilirubin, Total 0.6 0.3 - 1.2 mg/dL    Total Protein 7.4 5.7 - 8.2 g/dL    Albumin 4.8 3.2 - 4.8 g/dL    Globulin  2.6 2.0 - 3.5 g/dL    A/G Ratio 1.8 1.0 - 2.0    Patient Fasting for CMP? Patient not present    TSH [E]    Collection Time: 01/03/25  9:12 AM   Result Value Ref Range    TSH 7.562 (H) 0.550 - 4.780 uIU/mL   T4 FREE [E]    Collection Time: 01/03/25  9:12 AM   Result Value Ref Range    Free T4 1.0 0.8 - 1.7 ng/dL         Imaging & Referrals:  None   No orders of the defined types were placed in this encounter.

## 2025-01-03 NOTE — PROGRESS NOTES
Pt here for C5 D1 Keytruda.  Arrives Ambulating independently, accompanied by Self     Patient was evaluated today by RENALDO.    Oral medications included in this regimen:  no    Patient confirms comprehension of cancer treatment schedule:  yes    Pregnancy screening:  Denies possibility of pregnancy    Modifications in dose or schedule:  No    Medications appearance and physical integrity checked by RN: yes.    Chemotherapy IV pump settings verified by 2 RNs:  No due to targeted therapy IV administration.  Frequency of blood return and site check throughout administration: Prior to administration and At completion of therapy     Infusion/treatment outcome:  patient tolerated treatment without incident    Education Record    Learner:  Patient  Barriers / Limitations:  None  Method:  Discussion  Education / instructions given:  Plan of care reviewed  Outcome:  Shows understanding    Discharged Home, Ambulating independently, accompanied by:Self    Patient/family verbalized understanding of future appointments: by [employer] messaging

## 2025-02-03 ENCOUNTER — HOSPITAL ENCOUNTER (OUTPATIENT)
Dept: CT IMAGING | Facility: HOSPITAL | Age: 55
Discharge: HOME | End: 2025-02-03
Attending: NURSE PRACTITIONER
Payer: COMMERCIAL

## 2025-02-03 ENCOUNTER — HOSPITAL ENCOUNTER (OUTPATIENT)
Dept: ULTRASOUND IMAGING | Age: 55
Discharge: HOME | End: 2025-02-03
Attending: INTERNAL MEDICINE
Payer: COMMERCIAL

## 2025-02-03 DIAGNOSIS — E04.9 GOITER: ICD-10-CM

## 2025-02-03 DIAGNOSIS — C34.11 PRIMARY CANCER OF RIGHT UPPER LOBE OF LUNG (HCC): ICD-10-CM

## 2025-02-03 PROCEDURE — 76536 US EXAM OF HEAD AND NECK: CPT | Performed by: INTERNAL MEDICINE

## 2025-02-03 PROCEDURE — 71260 CT THORAX DX C+: CPT | Performed by: NURSE PRACTITIONER

## 2025-02-14 ENCOUNTER — OFFICE VISIT (OUTPATIENT)
Age: 55
End: 2025-02-14
Attending: INTERNAL MEDICINE
Payer: COMMERCIAL

## 2025-02-14 ENCOUNTER — NURSE ONLY (OUTPATIENT)
Age: 55
End: 2025-02-14
Attending: INTERNAL MEDICINE
Payer: COMMERCIAL

## 2025-02-14 VITALS
OXYGEN SATURATION: 97 % | SYSTOLIC BLOOD PRESSURE: 108 MMHG | RESPIRATION RATE: 16 BRPM | TEMPERATURE: 97 F | HEIGHT: 70 IN | BODY MASS INDEX: 31.07 KG/M2 | HEART RATE: 74 BPM | WEIGHT: 217 LBS | DIASTOLIC BLOOD PRESSURE: 68 MMHG

## 2025-02-14 DIAGNOSIS — E03.2 HYPOTHYROIDISM DUE TO MEDICATION: ICD-10-CM

## 2025-02-14 DIAGNOSIS — C34.11 PRIMARY CANCER OF RIGHT UPPER LOBE OF LUNG (HCC): Primary | ICD-10-CM

## 2025-02-14 DIAGNOSIS — Z51.12 ENCOUNTER FOR ANTINEOPLASTIC IMMUNOTHERAPY: ICD-10-CM

## 2025-02-14 LAB
ALBUMIN SERPL-MCNC: 4.5 G/DL (ref 3.2–4.8)
ALBUMIN/GLOB SERPL: 1.8 {RATIO} (ref 1–2)
ALP LIVER SERPL-CCNC: 81 U/L
ALT SERPL-CCNC: 23 U/L
ANION GAP SERPL CALC-SCNC: 9 MMOL/L (ref 0–18)
AST SERPL-CCNC: 18 U/L (ref ?–34)
BASOPHILS # BLD AUTO: 0.07 X10(3) UL (ref 0–0.2)
BASOPHILS NFR BLD AUTO: 1.1 %
BILIRUB SERPL-MCNC: 0.4 MG/DL (ref 0.3–1.2)
BUN BLD-MCNC: 14 MG/DL (ref 9–23)
BUN/CREAT SERPL: 13 (ref 10–20)
CALCIUM BLD-MCNC: 9.2 MG/DL (ref 8.7–10.4)
CHLORIDE SERPL-SCNC: 104 MMOL/L (ref 98–112)
CO2 SERPL-SCNC: 26 MMOL/L (ref 21–32)
CREAT BLD-MCNC: 1.08 MG/DL
DEPRECATED RDW RBC AUTO: 45 FL (ref 35.1–46.3)
EGFRCR SERPLBLD CKD-EPI 2021: 61 ML/MIN/1.73M2 (ref 60–?)
EOSINOPHIL # BLD AUTO: 0.07 X10(3) UL (ref 0–0.7)
EOSINOPHIL NFR BLD AUTO: 1.1 %
ERYTHROCYTE [DISTWIDTH] IN BLOOD BY AUTOMATED COUNT: 12.5 % (ref 11–15)
GLOBULIN PLAS-MCNC: 2.5 G/DL (ref 2–3.5)
GLUCOSE BLD-MCNC: 94 MG/DL (ref 70–99)
HCT VFR BLD AUTO: 37.6 %
HGB BLD-MCNC: 12.6 G/DL
IMM GRANULOCYTES # BLD AUTO: 0.02 X10(3) UL (ref 0–1)
IMM GRANULOCYTES NFR BLD: 0.3 %
LYMPHOCYTES # BLD AUTO: 1.88 X10(3) UL (ref 1–4)
LYMPHOCYTES NFR BLD AUTO: 28.4 %
MCH RBC QN AUTO: 32.9 PG (ref 26–34)
MCHC RBC AUTO-ENTMCNC: 33.5 G/DL (ref 31–37)
MCV RBC AUTO: 98.2 FL
MONOCYTES # BLD AUTO: 0.48 X10(3) UL (ref 0.1–1)
MONOCYTES NFR BLD AUTO: 7.3 %
NEUTROPHILS # BLD AUTO: 4.09 X10 (3) UL (ref 1.5–7.7)
NEUTROPHILS # BLD AUTO: 4.09 X10(3) UL (ref 1.5–7.7)
NEUTROPHILS NFR BLD AUTO: 61.8 %
OSMOLALITY SERPL CALC.SUM OF ELEC: 288 MOSM/KG (ref 275–295)
PLATELET # BLD AUTO: 280 10(3)UL (ref 150–450)
POTASSIUM SERPL-SCNC: 5.2 MMOL/L (ref 3.5–5.1)
PROT SERPL-MCNC: 7 G/DL (ref 5.7–8.2)
RBC # BLD AUTO: 3.83 X10(6)UL
SODIUM SERPL-SCNC: 139 MMOL/L (ref 136–145)
T4 FREE SERPL-MCNC: 1.1 NG/DL (ref 0.8–1.7)
TSI SER-ACNC: 4.67 UIU/ML (ref 0.55–4.78)
WBC # BLD AUTO: 6.6 X10(3) UL (ref 4–11)

## 2025-02-14 NOTE — PROGRESS NOTES
Emy to infusion today for C6 D1 KEYTRUDA. Arrives Ambulating independently, accompanied by Self     Patient was evaluated today by MD.    Oral medications included in this regimen:  no    Patient confirms comprehension of cancer treatment schedule:  yes    Pregnancy screening:  Denies possibility of pregnancy    Modifications in dose or schedule:  No    Medications appearance and physical integrity checked by RN: yes.    Chemotherapy IV pump settings verified by 2 RNs:  Yes.  Frequency of blood return and site check throughout administration: Prior to administration, Prior to each drug, and At completion of therapy     Infusion/treatment outcome:  patient tolerated treatment without incident    Education Record    Learner:  Patient  Barriers / Limitations:  None  Method:  Reinforcement  Education / instructions given:  Plan of care  Outcome:  Shows understanding    Discharged Home, Ambulating independently, accompanied by:Self    Patient/family verbalized understanding of future appointments: by [employer] messaging

## 2025-02-14 NOTE — PROGRESS NOTES
HPI   Emy Gibson is a 54 year old female here for follow up of Primary cancer of right upper lobe of lung (HCC)    Encounter for antineoplastic immunotherapy    Hypothyroidism due to medication.    She s/p of cycle 4 of pemetrexed/cisplatin on 4/26/24.    Currently s/p cycle 5 pembrolizumab single agent.     Fatigue:  Yes, states worse with  more cycles.  States lots of stressors in the family may contribute to fatigue, not sleeping as well.      Fevers:  No    Appetite/taste changes:  Yes, taste and appetite changes , states thinks from having quit smoking.  Improving.    Mucositis:  No    Weight changes:  No    Nausea/vomiting:  No    Diarrhea:  No    Constipation:  No    Peripheral neuropathy:  Yes, since last visit intermittent tingling of the toes. Less often.      Numbness in the R axilla and in the upper chest.  Pain since surgery.  No complaints today, states less.         ECOG PS 1, fatigue     Oncology History   Primary cancer of right upper lobe of lung (HCC)   2/14/2024 Cancer Staged    Staging form: Lung, AJCC 8th Edition  - Pathologic stage from 2/14/2024: Stage IIIA (pT2a, pN2, cM0)     2/15/2024 Initial Diagnosis    Adenocarcinoma of right lung (HCC)     2/23/2024 - 4/26/2024 Chemotherapy    OP Pemetrexed / CISplatin  Plan Provider: Igor Mccurdy MD  Treatment goal: Curative  Line of treatment: [No plan line of treatment]     5/29/2024 -  Chemotherapy    OP lung cancer Pembrolizumab  Plan Provider: Stanislaw Maier MD  Treatment goal: Curative- Adjuvant  Line of treatment: [No plan line of treatment]         Review of Systems:     Review of Systems   Constitutional:  Positive for fatigue.   HENT:   Positive for tinnitus (has gotten better with massage or tapping behind the ears.).         Sinus pressure last week   Respiratory:  Positive for cough (states from URI that she had and sinuses draining in the back of throat.). Negative for shortness of breath.    Cardiovascular:  Negative for chest  pain.   Gastrointestinal:  Negative for abdominal pain.   Genitourinary:  Positive for nocturia. Negative for dysuria and frequency.    Musculoskeletal:  Positive for arthralgias (R hip with walking chronic) and back pain (when laying down has pain on the LLE that is lateral and burning, states prior to dx of ca.). Negative for neck pain.   Skin:  Negative for itching and rash.   Neurological:  Positive for numbness (nerve pain arm post thoracotomy, improved). Negative for dizziness and headaches.   Hematological:  Negative for adenopathy. Does not bruise/bleed easily.   Psychiatric/Behavioral:  Positive for sleep disturbance.          Current Outpatient Medications   Medication Sig Dispense Refill    LORazepam 0.5 MG Oral Tab Take 1 tablet (0.5 mg total) by mouth nightly as needed for Anxiety. 30 tablet 0    UNITHROID 50 MCG Oral Tab Take 1 tablet (50 mcg total) by mouth before breakfast. 90 tablet 0    albuterol 108 (90 Base) MCG/ACT Inhalation Aero Soln Inhale 2 puffs into the lungs every 6 (six) hours as needed for Wheezing. 8.5 g 5    meclizine 25 MG Oral Tab Take 1 tablet (25 mg total) by mouth 3 (three) times daily as needed. 21 tablet 0     Allergies:   Allergies   Allergen Reactions    Singulair [Montelukast] SWELLING    Pollen OTHER (SEE COMMENTS)     Nasal congestion, runny nose       Past Medical History:    Adenocarcinoma of lung, right (HCC)    T2aN2    Cancer (HCC)    H/O: hysterectomy    Shortness of breath     Past Surgical History:   Procedure Laterality Date    Hysteroscopy      Lung lobectomy Right 02/08/2024    VATS upper lobectomy by Dr. Fong    Tonsillectomy       Social History     Socioeconomic History    Marital status:      Spouse name: Not on file    Number of children: Not on file    Years of education: Not on file    Highest education level: Not on file   Occupational History    Not on file   Tobacco Use    Smoking status: Former     Current packs/day: 0.00     Types:  Cigarettes     Quit date: 2024     Years since quittin.0    Smokeless tobacco: Never   Vaping Use    Vaping status: Never Used   Substance and Sexual Activity    Alcohol use: Not Currently    Drug use: Not Currently    Sexual activity: Not on file   Other Topics Concern    Not on file   Social History Narrative    Not on file     Social Drivers of Health     Food Insecurity: No Food Insecurity (2024)    Food Insecurity     Food Insecurity: Never true   Transportation Needs: No Transportation Needs (2024)    Transportation Needs     Lack of Transportation: No   Housing Stability: Low Risk  (2024)    Housing Stability     Housing Instability: No     Housing Instability Emergency: Not on file     Crib or Bassinette: Not on file     Family History   Problem Relation Age of Onset    Hypertension Mother     Cancer Mother     Lung Disorder Mother     Cancer Paternal Grandfather          PHYSICAL EXAM:    /68 (BP Location: Right arm, Patient Position: Sitting, Cuff Size: adult)   Pulse 74   Temp 97 °F (36.1 °C) (Tympanic)   Resp 16   Ht 1.778 m (5' 10\")   Wt 98.4 kg (217 lb)   SpO2 97%   BMI 31.14 kg/m²   Wt Readings from Last 6 Encounters:   25 98.4 kg (217 lb)   25 96.2 kg (212 lb)   11/15/24 98.4 kg (217 lb)   10/16/24 100.2 kg (221 lb)   10/04/24 98.4 kg (217 lb)   24 95.3 kg (210 lb)     Physical Exam  General: Patient is alert, not in acute distress.  HEENT: EOMs intact. PERRL.    Neck: No JVD. No palpable lymphadenopathy. Neck is supple.  Chest: Clear to auscultation.  Heart: Regular rate and rhythm.   Abdomen: Soft, non tender with good bowel sounds.  Extremities: No edema.  Neurological: Grossly intact.   Lymphatics: There is no palpable lymphadenopathy throughout in the cervical, supraclavicular, axillary, or inguinal regions.  Psych/Depression: nl        ASSESSMENT/PLAN:     1. Primary cancer of right upper lobe of lung (HCC)    2. Encounter for antineoplastic  immunotherapy    3. Hypothyroidism due to medication       Cancer Staging   Primary cancer of right upper lobe of lung (HCC)  Staging form: Lung, AJCC 8th Edition  - Pathologic stage from 2/14/2024: Stage IIIA (pT2a, pN2, cM0) - Signed by Stanislaw Maier MD on 5/24/2024    Patient initially diagnosed with invasive adenocarcinoma of the right upper lobe in December 2023.  She then underwent a right upper lobectomy with mediastinal lymph node dissection on 2/14/2024.  Staging as above.  This was ALK and EGFR negative and PD-L1 40%.    Patient has completed 4 cycles of adjuvant chemotherapy with cisplatin and pembrolizumab, course of treatment from 2/23/2024 through 4/26/2024.    Since the patient is PD-L1 positive, we will proceed with adjuvant pembrolizumab 400 mg every 6 weeks for up to 1 year.      S/p cycle 5  pembrolizumab every 6 weeks     Noted elevated TSH - 49.580 pt asymptomatic Cycle 5 was held.  TSH improved with medication    Proceed cycle 6      Hypothyroidism from immunotherapy:  Endocrine following      Insomnia:  lorazepam 0.5 mg po at bedtime prn - refill sent       FMLA needs to be extended   Inrtermittent to cover thru May 2025 (end of therapy)    Pt concerned re time off and ability to complete therapy due to limited FMLA     Discussed with the patient NCCN guidelines for surveillance which recommend H&P and chest CT ± contrast every 3-6 mo  for 3 y, then H&P and chest CT ± contrast every 6 mo for 2 y, then H&P and a low-dose non-contrast-enhanced chest CT annually.    Given the patient has completed her course of chemotherapy, will be now on adjuvant immune checkpoint inhibitor for a year, will be initiating surveillance that will overlap with the first year of the immune checkpoint immunotherapy.  CT on 2/3/2025 was JENNIFER.  Recommend repeat imaging in 6 months, she will be 2 years after that imaging in August 2025.  She will then have imaging once a year part of her surveillance.    Once patient  completes her course of adjuvant treatment, she will be referred to our survivorship clinic.    The patient is advised to begin or continue progressive daily aerobic exercise program, follow a low fat, low cholesterol diet, attempt to loose weight, decrease or avoid alcohol intake, have regular screening exams as age/gender appropriate, reduce salt in the diet and cooking, reduce exposure to stress, improve dietary compliance and continue current medications.  Information for the [facility] and programs to support the above recommendations was provided to the patient.     Patient does not have a primary care doctor.  I discussed with patient the importance of establishing care with a primary care doctor for routine medical care, screening and prevention.  Patient will be provided information about her primary care doctors that are located close to her home.    MDM high risk.  No orders of the defined types were placed in this encounter.      Results From Past 48 Hours:  Recent Results (from the past 48 hours)   CBC W Differential W Platelet    Collection Time: 02/14/25  9:26 AM   Result Value Ref Range    WBC 6.6 4.0 - 11.0 x10(3) uL    RBC 3.83 3.80 - 5.30 x10(6)uL    HGB 12.6 12.0 - 16.0 g/dL    HCT 37.6 35.0 - 48.0 %    MCV 98.2 80.0 - 100.0 fL    MCH 32.9 26.0 - 34.0 pg    MCHC 33.5 31.0 - 37.0 g/dL    RDW-SD 45.0 35.1 - 46.3 fL    RDW 12.5 11.0 - 15.0 %    .0 150.0 - 450.0 10(3)uL    Neutrophil Absolute Prelim 4.09 1.50 - 7.70 x10 (3) uL    Neutrophil Absolute 4.09 1.50 - 7.70 x10(3) uL    Lymphocyte Absolute 1.88 1.00 - 4.00 x10(3) uL    Monocyte Absolute 0.48 0.10 - 1.00 x10(3) uL    Eosinophil Absolute 0.07 0.00 - 0.70 x10(3) uL    Basophil Absolute 0.07 0.00 - 0.20 x10(3) uL    Immature Granulocyte Absolute 0.02 0.00 - 1.00 x10(3) uL    Neutrophil % 61.8 %    Lymphocyte % 28.4 %    Monocyte % 7.3 %    Eosinophil % 1.1 %    Basophil % 1.1 %    Immature Granulocyte % 0.3 %   Comp Metabolic Panel  (14)    Collection Time: 02/14/25  9:26 AM   Result Value Ref Range    Glucose 94 70 - 99 mg/dL    Sodium 139 136 - 145 mmol/L    Potassium 5.2 (H) 3.5 - 5.1 mmol/L    Chloride 104 98 - 112 mmol/L    CO2 26.0 21.0 - 32.0 mmol/L    Anion Gap 9 0 - 18 mmol/L    BUN 14 9 - 23 mg/dL    Creatinine 1.08 (H) 0.55 - 1.02 mg/dL    BUN/CREA Ratio 13.0 10.0 - 20.0    Calcium, Total 9.2 8.7 - 10.4 mg/dL    Calculated Osmolality 288 275 - 295 mOsm/kg    eGFR-Cr 61 >=60 mL/min/1.73m2    ALT 23 10 - 49 U/L    AST 18 <34 U/L    Alkaline Phosphatase 81 41 - 108 U/L    Bilirubin, Total 0.4 0.3 - 1.2 mg/dL    Total Protein 7.0 5.7 - 8.2 g/dL    Albumin 4.5 3.2 - 4.8 g/dL    Globulin  2.5 2.0 - 3.5 g/dL    A/G Ratio 1.8 1.0 - 2.0    Patient Fasting for CMP? Patient not present    TSH and Free T4    Collection Time: 02/14/25  9:29 AM   Result Value Ref Range    Free T4 1.1 0.8 - 1.7 ng/dL    TSH 4.669 0.550 - 4.780 uIU/mL         Imaging & Referrals:  None   No orders of the defined types were placed in this encounter.    PROCEDURE: CT CHEST(CONTRAST ONLY) (CPT=71260)     COMPARISON: PET STANDARD BODY SCAN (CPT=78815), 12/08/2023, 8:16 AM.  [facility], CT CHEST PE AORTA (IV ONLY) (CPT=71260), 11/06/2023, 5:01 PM.  CT CHEST BRONCH NAVIGATION PRE OP LESS THAN 6 WEEKS (CPT=76497), 12/27/2023, 7:48 AM.    [facility], CT CHEST(CONTRAST ONLY) (CPT=71260), 8/09/2024, 7:08 AM.     INDICATIONS: Primary cancer of right upper lobe of lung.     TECHNIQUE: Multidetector CT images of the chest were obtained with non-ionic intravenous contrast material. Automated exposure control for dose reduction was used. Adjustment of the mA and/or kV was done based on the patient's size. Iterative  reconstruction technique for dose reduction was employed.       FINDINGS:  CARDIAC: The heart is not enlarged.  VASCULATURE: The thoracic aorta has unremarkable configuration without aneurysmal dilatation.    LUNGS/PLEURA: Postoperative  changes of right upper lobectomy are again identified.  There is right lung volume loss with mild rightward mediastinal shift.  Stable right perihilar scarring that extends to the right lung apex.  Mild-to-moderate emphysema.   Stable subpleural 4-5 mm nodule in the left upper lobe (series 4, image 37).  No pleural effusion or pneumothorax.  Stable additional 5 ground-glass density nodule in the left upper lobe (series 4, image 49).  Stable tiny 2 mm subpleural micronodule in  the left lower lobe (series 4, image 55).  Small scattered calcified granulomas.  AIRWAYS: The tracheobronchial tree is without central mass or obstructing lesion.  MEDIASTINUM/AARON: Scattered prominent but nonenlarged/subcentimeter short axis right paratracheal and left precarinal mediastinal lymph nodes, which were present on prior exam.  CHEST WALL: No axillary mass or lymphadenopathy.    LIMITED ABDOMEN: Small retrocardiac hiatal hernia.  BONES: No bony lesion or fracture is seen.  OTHER: Negative.               Impression   CONCLUSION:  1. History of right upper lobe adenocarcinoma.  Postoperative changes of right upper lobectomy are again identified.  No definite residual/recurrent disease in the thorax.  Continued surveillance imaging is recommended.  2. Nonenlarged/subcentimeter short axis right paratracheal and left precarinal mediastinal lymph nodes, which are unchanged since prior chest CT from August, 2024 and therefore likely reactive.  3. Small 4-5 mm noncalcified solid and ground-glass density nodules in the left upper lobe, which are unchanged since index chest CT from November, 2023. Small size and stability suggest benign etiology.  4. Emphysema.  5. Small retrocardiac hiatal hernia.          elm-remote     Dictated by (CST): Davonte Edwards MD on 2/06/2025 at 8:39 AM      Finalized by (CST): Davonte Edwards MD on 2/06/2025 at 8:52 AM

## 2025-02-20 DIAGNOSIS — E03.2 HYPOTHYROIDISM DUE TO MEDICATION: ICD-10-CM

## 2025-02-21 NOTE — TELEPHONE ENCOUNTER
Endocrine refill protocol for medications for hypothyroidism and hyperthyroidism    Protocol Criteria:  PASSED Reason: N/A    If all below requirements are met, send a 90-day supply with 1 refill per provider protocol.    Verify appointment with Endocrinology completed in the last 12 months or scheduled in the next 6 months.    Normal TSH result in the past 12 months   Review recent telephone encounters and mychart communications with patient to ensure a dose change has not occurred since last office visit that was not updated in the medication history list     Last completed office visit:10/16/2024 Jenny Maloney MD   Next scheduled Follow up:   Future Appointments   Date Time Provider Department Center                        4/16/2025 11:00 AM Jenny Maloney MD [facility]      Last TSH result:   TSH   Date Value Ref Range Status   02/14/2025 4.669 0.550 - 4.780 uIU/mL Final

## 2025-02-26 RX ORDER — LEVOTHYROXINE SODIUM 50 UG/1
50 TABLET ORAL
Qty: 90 TABLET | Refills: 1 | Status: SHIPPED | OUTPATIENT
Start: 2025-02-26

## 2025-03-28 ENCOUNTER — OFFICE VISIT (OUTPATIENT)
Age: 55
End: 2025-03-28
Attending: INTERNAL MEDICINE
Payer: COMMERCIAL

## 2025-03-28 ENCOUNTER — NURSE ONLY (OUTPATIENT)
Age: 55
End: 2025-03-28
Attending: INTERNAL MEDICINE
Payer: COMMERCIAL

## 2025-03-28 VITALS
DIASTOLIC BLOOD PRESSURE: 72 MMHG | OXYGEN SATURATION: 97 % | BODY MASS INDEX: 31.5 KG/M2 | WEIGHT: 220 LBS | SYSTOLIC BLOOD PRESSURE: 112 MMHG | TEMPERATURE: 98 F | HEIGHT: 70 IN | RESPIRATION RATE: 16 BRPM | HEART RATE: 76 BPM

## 2025-03-28 DIAGNOSIS — E03.2 HYPOTHYROIDISM DUE TO MEDICATION: ICD-10-CM

## 2025-03-28 DIAGNOSIS — Z51.12 ENCOUNTER FOR ANTINEOPLASTIC IMMUNOTHERAPY: ICD-10-CM

## 2025-03-28 DIAGNOSIS — C34.11 PRIMARY CANCER OF RIGHT UPPER LOBE OF LUNG (HCC): Primary | ICD-10-CM

## 2025-03-28 LAB
ALBUMIN SERPL-MCNC: 4.8 G/DL (ref 3.2–4.8)
ALBUMIN/GLOB SERPL: 1.8 {RATIO} (ref 1–2)
ALP LIVER SERPL-CCNC: 81 U/L
ALT SERPL-CCNC: 38 U/L
ANION GAP SERPL CALC-SCNC: 9 MMOL/L (ref 0–18)
AST SERPL-CCNC: 25 U/L (ref ?–34)
BASOPHILS # BLD AUTO: 0.05 X10(3) UL (ref 0–0.2)
BASOPHILS NFR BLD AUTO: 0.8 %
BILIRUB SERPL-MCNC: 0.5 MG/DL (ref 0.3–1.2)
BUN BLD-MCNC: 15 MG/DL (ref 9–23)
BUN/CREAT SERPL: 13.2 (ref 10–20)
CALCIUM BLD-MCNC: 9.7 MG/DL (ref 8.7–10.4)
CHLORIDE SERPL-SCNC: 104 MMOL/L (ref 98–112)
CO2 SERPL-SCNC: 26 MMOL/L (ref 21–32)
CREAT BLD-MCNC: 1.14 MG/DL
DEPRECATED RDW RBC AUTO: 43.8 FL (ref 35.1–46.3)
EGFRCR SERPLBLD CKD-EPI 2021: 57 ML/MIN/1.73M2 (ref 60–?)
EOSINOPHIL # BLD AUTO: 0.12 X10(3) UL (ref 0–0.7)
EOSINOPHIL NFR BLD AUTO: 1.9 %
ERYTHROCYTE [DISTWIDTH] IN BLOOD BY AUTOMATED COUNT: 12.4 % (ref 11–15)
GLOBULIN PLAS-MCNC: 2.6 G/DL (ref 2–3.5)
GLUCOSE BLD-MCNC: 108 MG/DL (ref 70–99)
HCT VFR BLD AUTO: 40.1 %
HGB BLD-MCNC: 13.3 G/DL
IMM GRANULOCYTES # BLD AUTO: 0.02 X10(3) UL (ref 0–1)
IMM GRANULOCYTES NFR BLD: 0.3 %
LYMPHOCYTES # BLD AUTO: 1.79 X10(3) UL (ref 1–4)
LYMPHOCYTES NFR BLD AUTO: 28.6 %
MCH RBC QN AUTO: 31.7 PG (ref 26–34)
MCHC RBC AUTO-ENTMCNC: 33.2 G/DL (ref 31–37)
MCV RBC AUTO: 95.5 FL
MONOCYTES # BLD AUTO: 0.42 X10(3) UL (ref 0.1–1)
MONOCYTES NFR BLD AUTO: 6.7 %
NEUTROPHILS # BLD AUTO: 3.85 X10 (3) UL (ref 1.5–7.7)
NEUTROPHILS # BLD AUTO: 3.85 X10(3) UL (ref 1.5–7.7)
NEUTROPHILS NFR BLD AUTO: 61.7 %
OSMOLALITY SERPL CALC.SUM OF ELEC: 289 MOSM/KG (ref 275–295)
PLATELET # BLD AUTO: 249 10(3)UL (ref 150–450)
POTASSIUM SERPL-SCNC: 4.8 MMOL/L (ref 3.5–5.1)
PROT SERPL-MCNC: 7.4 G/DL (ref 5.7–8.2)
RBC # BLD AUTO: 4.2 X10(6)UL
SODIUM SERPL-SCNC: 139 MMOL/L (ref 136–145)
T4 FREE SERPL-MCNC: 1 NG/DL (ref 0.8–1.7)
T4 FREE SERPL-MCNC: 1 NG/DL (ref 0.8–1.7)
TSI SER-ACNC: 4.2 UIU/ML (ref 0.55–4.78)
TSI SER-ACNC: 4.32 UIU/ML (ref 0.55–4.78)
WBC # BLD AUTO: 6.3 X10(3) UL (ref 4–11)

## 2025-03-28 NOTE — PROGRESS NOTES
HPI   Emy Gibson is a 55 year old female here for follow up of Primary cancer of right upper lobe of lung (HCC)    Encounter for antineoplastic immunotherapy    Hypothyroidism due to medication.    She s/p of cycle 4 of pemetrexed/cisplatin on 4/26/24.    Currently s/p cycle 6  pembrolizumab single agent.     Fatigue:  Yes, stable  States lots of stressors in the family may contribute to fatigue, not sleeping as well.      Fevers:  No    Appetite/taste changes:  Yes, taste and appetite changes , states thinks from having quit smoking.  Improving.    Mucositis:  No    Weight changes:  No    Nausea/vomiting:  No    Diarrhea:  No    Constipation:  No    Peripheral neuropathy:  Yes, since last visit intermittent tingling of the toes.  Much less often.      Numbness in the R axilla and in the upper chest.  Pain since surgery.  No complaints today, states less.         ECOG PS 1, fatigue     Oncology History   Primary cancer of right upper lobe of lung (HCC)   2/14/2024 Cancer Staged    Staging form: Lung, AJCC 8th Edition  - Pathologic stage from 2/14/2024: Stage IIIA (pT2a, pN2, cM0)     2/15/2024 Initial Diagnosis    Adenocarcinoma of right lung (HCC)     2/23/2024 - 4/26/2024 Chemotherapy    OP Pemetrexed / CISplatin  Plan Provider: Igor Mccurdy MD  Treatment goal: Curative  Line of treatment: [No plan line of treatment]     5/29/2024 -  Chemotherapy    OP lung cancer Pembrolizumab  Plan Provider: Stanislaw Maier MD  Treatment goal: Curative- Adjuvant  Line of treatment: [No plan line of treatment]         Review of Systems:     Review of Systems   HENT:   Positive for tinnitus (has gotten better with massage or tapping behind the ears.).         Sinus pressure last week   Respiratory:  Positive for cough (from post nasal gtt). Negative for shortness of breath.    Cardiovascular:  Negative for chest pain.   Gastrointestinal:  Negative for abdominal pain.   Genitourinary:  Positive for nocturia. Negative for  dysuria and frequency.    Musculoskeletal:  Positive for arthralgias (R hip with walking chronic) and back pain (when laying down has pain on the LLE that is lateral and burning, states prior to dx of ca.). Negative for neck pain.   Skin:  Negative for itching and rash.   Neurological:  Positive for numbness (nerve pain arm post thoracotomy, improved). Negative for dizziness and headaches.   Hematological:  Negative for adenopathy. Does not bruise/bleed easily.   Psychiatric/Behavioral:  Positive for sleep disturbance.          Current Outpatient Medications   Medication Sig Dispense Refill    UNITHROID 50 MCG Oral Tab TAKE 1 TABLET(50 MCG) BY MOUTH BEFORE BREAKFAST 90 tablet 1    LORazepam 0.5 MG Oral Tab Take 1 tablet (0.5 mg total) by mouth nightly as needed for Anxiety. 30 tablet 0    albuterol 108 (90 Base) MCG/ACT Inhalation Aero Soln Inhale 2 puffs into the lungs every 6 (six) hours as needed for Wheezing. 8.5 g 5    meclizine 25 MG Oral Tab Take 1 tablet (25 mg total) by mouth 3 (three) times daily as needed. 21 tablet 0     Allergies:   Allergies   Allergen Reactions    Singulair [Montelukast] SWELLING    Pollen OTHER (SEE COMMENTS)     Nasal congestion, runny nose       Past Medical History:    Adenocarcinoma of lung, right (HCC)    T2aN2    Cancer (HCC)    H/O: hysterectomy    Shortness of breath     Past Surgical History:   Procedure Laterality Date    Hysteroscopy      Lung lobectomy Right 2024    VATS upper lobectomy by Dr. Fong    Tonsillectomy       Social History     Socioeconomic History    Marital status:      Spouse name: Not on file    Number of children: Not on file    Years of education: Not on file    Highest education level: Not on file   Occupational History    Not on file   Tobacco Use    Smoking status: Former     Current packs/day: 0.00     Types: Cigarettes     Quit date: 2024     Years since quittin.1    Smokeless tobacco: Never   Vaping Use    Vaping status:  Never Used   Substance and Sexual Activity    Alcohol use: Not Currently    Drug use: Not Currently    Sexual activity: Not on file   Other Topics Concern    Not on file   Social History Narrative    Not on file     Social Drivers of Health     Food Insecurity: No Food Insecurity (2/8/2024)    Food Insecurity     Food Insecurity: Never true   Transportation Needs: No Transportation Needs (2/8/2024)    Transportation Needs     Lack of Transportation: No   Housing Stability: Low Risk  (2/8/2024)    Housing Stability     Housing Instability: No     Housing Instability Emergency: Not on file     Crib or Bassinette: Not on file     Family History   Problem Relation Age of Onset    Hypertension Mother     Cancer Mother     Lung Disorder Mother     Cancer Paternal Grandfather          PHYSICAL EXAM:    /72 (BP Location: Right arm, Patient Position: Sitting, Cuff Size: large)   Pulse 76   Temp 97.6 °F (36.4 °C) (Tympanic)   Resp 16   Ht 1.778 m (5' 10\")   Wt 99.8 kg (220 lb)   SpO2 97%   BMI 31.57 kg/m²   Wt Readings from Last 6 Encounters:   03/28/25 99.8 kg (220 lb)   02/14/25 98.4 kg (217 lb)   01/03/25 96.2 kg (212 lb)   11/15/24 98.4 kg (217 lb)   10/16/24 100.2 kg (221 lb)   10/04/24 98.4 kg (217 lb)     Physical Exam  General: Patient is alert, not in acute distress.  HEENT: EOMs intact. PERRL.    Neck: No JVD. No palpable lymphadenopathy. Neck is supple.  Chest: Clear to auscultation.  Heart: Regular rate and rhythm.   Abdomen: Soft, non tender with good bowel sounds.  Extremities: No edema.  Neurological: Grossly intact.   Lymphatics: There is no palpable lymphadenopathy throughout in the cervical, supraclavicular, axillary, or inguinal regions.  Psych/Depression: nl        ASSESSMENT/PLAN:     1. Primary cancer of right upper lobe of lung (HCC)    2. Encounter for antineoplastic immunotherapy    3. Hypothyroidism due to medication       Cancer Staging   Primary cancer of right upper lobe of lung  (HCC)  Staging form: Lung, AJCC 8th Edition  - Pathologic stage from 2/14/2024: Stage IIIA (pT2a, pN2, cM0) - Signed by Stanislaw Maier MD on 5/24/2024    Patient initially diagnosed with invasive adenocarcinoma of the right upper lobe in December 2023.  She then underwent a right upper lobectomy with mediastinal lymph node dissection on 2/14/2024.  Staging as above.  This was ALK and EGFR negative and PD-L1 40%.    Patient has completed 4 cycles of adjuvant chemotherapy with cisplatin and pembrolizumab, course of treatment from 2/23/2024 through 4/26/2024.    Since the patient is PD-L1 positive, we will proceed with adjuvant pembrolizumab 400 mg every 6 weeks for up to 1 year.      S/p cycle 6  pembrolizumab every 6 weeks     Noted elevated TSH - 49.580 pt asymptomatic Cycle 5 was held.  TSH improved with medication    Proceed cycle 7      Hypothyroidism from immunotherapy:  Endocrine following      Insomnia:  lorazepam 0.5 mg po at bedtime prn - refill sent       FMLA needs to be extended   Inrtermittent to cover thru May 2025 (end of therapy)    Pt concerned re time off and ability to complete therapy due to limited FMLA     Discussed with the patient NCCN guidelines for surveillance which recommend H&P and chest CT ± contrast every 3-6 mo  for 3 y, then H&P and chest CT ± contrast every 6 mo for 2 y, then H&P and a low-dose non-contrast-enhanced chest CT annually.    Given the patient has completed her course of chemotherapy, will be now on adjuvant immune checkpoint inhibitor for a year, will be initiating surveillance that will overlap with the first year of the immune checkpoint immunotherapy.  CT on 2/3/2025 was JENN[facility].  Recommend repeat imaging in 6 months, she will be 2 years after that imaging in August 2025.  She will then have imaging once a year part of her surveillance.    Once patient completes her course of adjuvant treatment, she will be referred to our survivorship clinic.    The patient is advised  to begin or continue progressive daily aerobic exercise program, follow a low fat, low cholesterol diet, attempt to loose weight, decrease or avoid alcohol intake, have regular screening exams as age/gender appropriate, reduce salt in the diet and cooking, reduce exposure to stress, improve dietary compliance and continue current medications.  Information for the [facility] and programs to support the above recommendations was provided to the patient.     Patient does not have a primary care doctor.  I discussed with patient the importance of establishing care with a primary care doctor for routine medical care, screening and prevention.  Patient will be provided information about her primary care doctors that are located close to her home.    MDM high risk.  No orders of the defined types were placed in this encounter.      Results From Past 48 Hours:  Recent Results (from the past 48 hours)   CBC W Differential W Platelet    Collection Time: 03/28/25  9:19 AM   Result Value Ref Range    WBC 6.3 4.0 - 11.0 x10(3) uL    RBC 4.20 3.80 - 5.30 x10(6)uL    HGB 13.3 12.0 - 16.0 g/dL    HCT 40.1 35.0 - 48.0 %    MCV 95.5 80.0 - 100.0 fL    MCH 31.7 26.0 - 34.0 pg    MCHC 33.2 31.0 - 37.0 g/dL    RDW-SD 43.8 35.1 - 46.3 fL    RDW 12.4 11.0 - 15.0 %    .0 150.0 - 450.0 10(3)uL    Neutrophil Absolute Prelim 3.85 1.50 - 7.70 x10 (3) uL    Neutrophil Absolute 3.85 1.50 - 7.70 x10(3) uL    Lymphocyte Absolute 1.79 1.00 - 4.00 x10(3) uL    Monocyte Absolute 0.42 0.10 - 1.00 x10(3) uL    Eosinophil Absolute 0.12 0.00 - 0.70 x10(3) uL    Basophil Absolute 0.05 0.00 - 0.20 x10(3) uL    Immature Granulocyte Absolute 0.02 0.00 - 1.00 x10(3) uL    Neutrophil % 61.7 %    Lymphocyte % 28.6 %    Monocyte % 6.7 %    Eosinophil % 1.9 %    Basophil % 0.8 %    Immature Granulocyte % 0.3 %   Comp Metabolic Panel (14)    Collection Time: 03/28/25  9:19 AM   Result Value Ref Range    Glucose 108 (H) 70 - 99 mg/dL    Sodium 139 136 -  145 mmol/L    Potassium 4.8 3.5 - 5.1 mmol/L    Chloride 104 98 - 112 mmol/L    CO2 26.0 21.0 - 32.0 mmol/L    Anion Gap 9 0 - 18 mmol/L    BUN 15 9 - 23 mg/dL    Creatinine 1.14 (H) 0.55 - 1.02 mg/dL    BUN/CREA Ratio 13.2 10.0 - 20.0    Calcium, Total 9.7 8.7 - 10.4 mg/dL    Calculated Osmolality 289 275 - 295 mOsm/kg    eGFR-Cr 57 (L) >=60 mL/min/1.73m2    ALT 38 10 - 49 U/L    AST 25 <34 U/L    Alkaline Phosphatase 81 41 - 108 U/L    Bilirubin, Total 0.5 0.3 - 1.2 mg/dL    Total Protein 7.4 5.7 - 8.2 g/dL    Albumin 4.8 3.2 - 4.8 g/dL    Globulin  2.6 2.0 - 3.5 g/dL    A/G Ratio 1.8 1.0 - 2.0    Patient Fasting for CMP? Patient not present    TSH [E]    Collection Time: 03/28/25  9:19 AM   Result Value Ref Range    TSH 4.196 0.550 - 4.780 uIU/mL   T4 FREE [E]    Collection Time: 03/28/25  9:19 AM   Result Value Ref Range    Free T4 1.0 0.8 - 1.7 ng/dL   TSH and Free T4    Collection Time: 03/28/25  9:23 AM   Result Value Ref Range    Free T4 1.0 0.8 - 1.7 ng/dL    TSH 4.321 0.550 - 4.780 uIU/mL         Imaging & Referrals:  None   No orders of the defined types were placed in this encounter.

## 2025-03-28 NOTE — PROGRESS NOTES
Pt here for C7 keytruda    Patient was evaluated today by     Oral medications included in this regimen:  no    Patient confirms comprehension of cancer treatment schedule:  yes    Pregnancy screening:  Not applicable    Modifications in dose or schedule:  No    Medications appearance and physical integrity checked by RN: yes.    Chemotherapy IV pump settings verified by 2 RNs:  No due to targeted therapy IV administration.  Frequency of blood return and site check throughout administration: Prior to administration, Prior to each drug, and At completion of therapy     Infusion/treatment outcome:  patient tolerated treatment without incident    Education Record    Learner:  Patient  Barriers / Limitations:  None  Method:  Discussion  Education / instructions given:  schedule for   Outcome:  Shows understanding    Discharged Other stable from infusion , Ambulating independently, accompanied by:Self    Patient/family verbalized understanding of future appointments: by [employer] messaging

## 2025-05-07 ENCOUNTER — TELEPHONE (OUTPATIENT)
Facility: LOCATION | Age: 55
End: 2025-05-07

## 2025-05-09 ENCOUNTER — OFFICE VISIT (OUTPATIENT)
Age: 55
End: 2025-05-09
Attending: INTERNAL MEDICINE
Payer: COMMERCIAL

## 2025-05-09 ENCOUNTER — NURSE ONLY (OUTPATIENT)
Age: 55
End: 2025-05-09
Attending: INTERNAL MEDICINE
Payer: COMMERCIAL

## 2025-05-09 VITALS
HEIGHT: 70 IN | RESPIRATION RATE: 16 BRPM | TEMPERATURE: 98 F | HEART RATE: 78 BPM | BODY MASS INDEX: 32.45 KG/M2 | OXYGEN SATURATION: 97 % | SYSTOLIC BLOOD PRESSURE: 107 MMHG | WEIGHT: 226.63 LBS | DIASTOLIC BLOOD PRESSURE: 69 MMHG

## 2025-05-09 DIAGNOSIS — C34.11 PRIMARY CANCER OF RIGHT UPPER LOBE OF LUNG (HCC): Primary | ICD-10-CM

## 2025-05-09 DIAGNOSIS — Z51.12 ENCOUNTER FOR ANTINEOPLASTIC IMMUNOTHERAPY: ICD-10-CM

## 2025-05-09 DIAGNOSIS — E03.2 HYPOTHYROIDISM DUE TO MEDICATION: ICD-10-CM

## 2025-05-09 LAB
ALBUMIN SERPL-MCNC: 4.7 G/DL (ref 3.2–4.8)
ALBUMIN/GLOB SERPL: 2.1 {RATIO} (ref 1–2)
ALP LIVER SERPL-CCNC: 87 U/L (ref 41–108)
ALT SERPL-CCNC: 24 U/L (ref 10–49)
ANION GAP SERPL CALC-SCNC: 8 MMOL/L (ref 0–18)
AST SERPL-CCNC: 16 U/L (ref ?–34)
BASOPHILS # BLD AUTO: 0.06 X10(3) UL (ref 0–0.2)
BASOPHILS NFR BLD AUTO: 0.9 %
BILIRUB SERPL-MCNC: 0.4 MG/DL (ref 0.3–1.2)
BUN BLD-MCNC: 14 MG/DL (ref 9–23)
BUN/CREAT SERPL: 14.1 (ref 10–20)
CALCIUM BLD-MCNC: 9.3 MG/DL (ref 8.7–10.4)
CHLORIDE SERPL-SCNC: 108 MMOL/L (ref 98–112)
CO2 SERPL-SCNC: 23 MMOL/L (ref 21–32)
CREAT BLD-MCNC: 0.99 MG/DL (ref 0.55–1.02)
DEPRECATED RDW RBC AUTO: 44.4 FL (ref 35.1–46.3)
EGFRCR SERPLBLD CKD-EPI 2021: 67 ML/MIN/1.73M2 (ref 60–?)
EOSINOPHIL # BLD AUTO: 0.13 X10(3) UL (ref 0–0.7)
EOSINOPHIL NFR BLD AUTO: 1.9 %
ERYTHROCYTE [DISTWIDTH] IN BLOOD BY AUTOMATED COUNT: 12.9 % (ref 11–15)
GLOBULIN PLAS-MCNC: 2.2 G/DL (ref 2–3.5)
GLUCOSE BLD-MCNC: 125 MG/DL (ref 70–99)
HCT VFR BLD AUTO: 38.6 % (ref 35–48)
HGB BLD-MCNC: 12.9 G/DL (ref 12–16)
IMM GRANULOCYTES # BLD AUTO: 0.02 X10(3) UL (ref 0–1)
IMM GRANULOCYTES NFR BLD: 0.3 %
LYMPHOCYTES # BLD AUTO: 1.69 X10(3) UL (ref 1–4)
LYMPHOCYTES NFR BLD AUTO: 24.5 %
MCH RBC QN AUTO: 31.2 PG (ref 26–34)
MCHC RBC AUTO-ENTMCNC: 33.4 G/DL (ref 31–37)
MCV RBC AUTO: 93.5 FL (ref 80–100)
MONOCYTES # BLD AUTO: 0.38 X10(3) UL (ref 0.1–1)
MONOCYTES NFR BLD AUTO: 5.5 %
NEUTROPHILS # BLD AUTO: 4.61 X10 (3) UL (ref 1.5–7.7)
NEUTROPHILS # BLD AUTO: 4.61 X10(3) UL (ref 1.5–7.7)
NEUTROPHILS NFR BLD AUTO: 66.9 %
OSMOLALITY SERPL CALC.SUM OF ELEC: 290 MOSM/KG (ref 275–295)
PLATELET # BLD AUTO: 236 10(3)UL (ref 150–450)
POTASSIUM SERPL-SCNC: 4.4 MMOL/L (ref 3.5–5.1)
PROT SERPL-MCNC: 6.9 G/DL (ref 5.7–8.2)
RBC # BLD AUTO: 4.13 X10(6)UL (ref 3.8–5.3)
SODIUM SERPL-SCNC: 139 MMOL/L (ref 136–145)
T4 FREE SERPL-MCNC: 1.1 NG/DL (ref 0.8–1.7)
TSI SER-ACNC: 4.14 UIU/ML (ref 0.55–4.78)
WBC # BLD AUTO: 6.9 X10(3) UL (ref 4–11)

## 2025-05-09 NOTE — PROGRESS NOTES
Pt here for C8D1 Drug(s)Keytruda.  Arrives Ambulating independently, accompanied by Self   PIV from lab with good blood return.     Patient was evaluated today by Treatment Nurse.  APN  Oral medications included in this regimen:  no    Patient confirms comprehension of cancer treatment schedule:  yes    Pregnancy screening:  Denies possibility of pregnancy    Modifications in dose or schedule:  No    Medications appearance and physical integrity checked by RN: yes.    Chemotherapy IV pump settings verified by 2 RNs:  No due to targeted therapy IV administration.  Frequency of blood return and site check throughout administration: Prior to administration, Prior to each drug, and At completion of therapy     Infusion/treatment outcome:  patient tolerated treatment without incident    PIV dc'd and pt left without complaints.     Education Record    Learner:  Patient  Barriers / Limitations:  None  Method:  Discussion  Education / instructions given:  reviewed plan of care, reviewed next appointments  Outcome:  Shows understanding    Discharged homeAmbulating independently, accompanied by:Self    Patient/family verbalized understanding of future appointments: by printed AVS

## 2025-06-03 ENCOUNTER — OFFICE VISIT (OUTPATIENT)
Facility: LOCATION | Age: 55
End: 2025-06-03
Payer: COMMERCIAL

## 2025-06-03 VITALS
HEART RATE: 73 BPM | BODY MASS INDEX: 32.07 KG/M2 | HEIGHT: 70 IN | SYSTOLIC BLOOD PRESSURE: 100 MMHG | DIASTOLIC BLOOD PRESSURE: 60 MMHG | WEIGHT: 224 LBS

## 2025-06-03 DIAGNOSIS — E07.9 THYROID DISEASE: ICD-10-CM

## 2025-06-03 DIAGNOSIS — C34.11 PRIMARY CANCER OF RIGHT UPPER LOBE OF LUNG (HCC): ICD-10-CM

## 2025-06-03 DIAGNOSIS — E03.2 HYPOTHYROIDISM DUE TO MEDICATION: Primary | ICD-10-CM

## 2025-06-03 DIAGNOSIS — R63.5 WEIGHT GAIN: ICD-10-CM

## 2025-06-03 PROCEDURE — 3008F BODY MASS INDEX DOCD: CPT | Performed by: INTERNAL MEDICINE

## 2025-06-03 PROCEDURE — 99214 OFFICE O/P EST MOD 30 MIN: CPT | Performed by: INTERNAL MEDICINE

## 2025-06-03 PROCEDURE — 3074F SYST BP LT 130 MM HG: CPT | Performed by: INTERNAL MEDICINE

## 2025-06-03 PROCEDURE — 3078F DIAST BP <80 MM HG: CPT | Performed by: INTERNAL MEDICINE

## 2025-06-03 RX ORDER — LEVOTHYROXINE SODIUM 75 UG/1
75 TABLET ORAL
Qty: 90 TABLET | Refills: 0 | Status: SHIPPED | OUTPATIENT
Start: 2025-06-03

## 2025-06-03 NOTE — PROGRESS NOTES
Reason for Visit:    hypothyroid d/t CPI   Requesting Physician:   ..None Pcp  No referring provider defined for this encounter.    CHIEF COMPLAINT:    Chief Complaint   Patient presents with    Hypothyroidism     F/U      Last completed office visit: 10/16/2024 Jenny Maloney MD   Next scheduled Follow up:   Future Appointments   Date Time Provider Department Center   6/3/2025  3:15 PM Tru Leal MD [facility] [facility]   6/20/2025 11:00 AM [facility] LAB1 [facility] [place]   6/20/2025 12:00 PM Stanislaw Maier MD [facility] [place]   6/20/2025  1:00 PM [facility] INFRN 3 [facility] [place]   8/1/2025  7:00 AM [facility] CT RM1 CARD [facility] CT [facility] [place]   9/9/2025  8:00 AM Tru Leal MD [facility]      HISTORY OF PRESENT ILLNESS:   Emy Gibson is a 55 year old female who presents with  hypothyroid d/t CPI   Pt is new to me. Was seen last on 10/16/2024 by Dr Jenny Maloney MD    She has wt gain   She is on unithroid 50  mcg every day since 2024  Takes meds as rec   S/p chemo and immunotherapy for lung cancer         Latest Reference Range & Units 05/09/25 09:03   T4,Free (Direct) 0.8 - 1.7 ng/dL 1.1   TSH 0.550 - 4.780 uIU/mL 4.136       ASSESSMENT AND PLAN:  Emy Gibson is a 55 year old female who presents with  hypothyroid d/t CPI   Clinically nonspecific symptoms   Labs showed normal TSH/FT4   Plan  Labs and follow up in 3 mo   Will increase thyroid dose 50 to 75 mcg/day of unithroid   Take you medication on empty stomach, not with any other medication or food. Wait 60 minutes before eating. Wait 4 hours before taking Vitamins, Calcium or iron. On the morning of the lab test, please take the medication after the blood test not before. Do not take Biotin 1 week before blood test.           [email or web address]    Don’t take your thyroid hormone at the same time as:  Walnuts.  Soybean flour.  Cottonseed meal.  Iron supplements or  multivitamins containing iron.  Calcium supplements.  Antacids that contain aluminum, magnesium or calcium.  Some ulcer medicines, such as sucralfate (Carafate).  Some cholesterol-lowering drugs, such as those containing cholestyramine (Prevalite, Locholest) and colestipol (Colestid).  To avoid possible problems, eat these foods or use these products several hours before or after you take your thyroid medicine.    Supplements containing biotin, common in hair and nail products, can make it hard to measure how much thyroid hormone is in the body. Biotin does not affect thyroid hormone levels. But supplements that have biotin should be stopped for at least a week before measuring thyroid function so that the measurement is correct.         PAST MEDICAL HISTORY:   Past Medical History:    Adenocarcinoma of lung, right (HCC)    T2aN2    Cancer (HCC)    H/O: hysterectomy    Shortness of breath       PAST SURGICAL HISTORY:   Past Surgical History:   Procedure Laterality Date    Hysteroscopy      Lung lobectomy Right 2024    VATS upper lobectomy by Dr. Fong    Tonsillectomy         CURRENT MEDICATIONS:     UNITHROID 75 MCG Oral Tab Take 1 tablet (75 mcg total) by mouth before breakfast. 90 tablet 0    UNITHROID 50 MCG Oral Tab TAKE 1 TABLET(50 MCG) BY MOUTH BEFORE BREAKFAST 90 tablet 1       ALLERGIES:  Allergies   Allergen Reactions    Singulair [Montelukast] SWELLING    Pollen OTHER (SEE COMMENTS)     Nasal congestion, runny nose       SOCIAL HISTORY:    Social History     Socioeconomic History    Marital status:    Tobacco Use    Smoking status: Former     Current packs/day: 0.00     Types: Cigarettes     Quit date: 2024     Years since quittin.3    Smokeless tobacco: Never   Vaping Use    Vaping status: Never Used   Substance and Sexual Activity    Alcohol use: Not Currently    Drug use: Not Currently        FAMILY HISTORY:   Family History   Problem Relation Age of Onset    Hypertension  Mother     Cancer Mother     Lung Disorder Mother     Cancer Paternal Grandfather            PHYSICAL EXAM:   Height: 5' 10\" (177.8 cm) (06/03 1438)  Weight: 224 lb (101.6 kg) (06/03 1438)  BSA (Calculated - sq m): 2.19 sq meters (06/03 1438)  Pulse: 73 (06/03 1438)  BP: 100/60 (06/03 1438)  Temp: --  Do Not Use - Resp Rate: --  SpO2: --    Body mass index is 32.14 kg/m².       DATA:     Pertinent data reviewed  2/2025 US THYROID Unremarkable thyroid ultrasound.     No results found.    No results for input(s): \"TSH\", \"T4F\", \"T3F\", \"THYP\" in the last 72 hours.  TSH   Date Value Ref Range Status   05/09/2025 4.136 0.550 - 4.780 uIU/mL Final     No results found for: \"A1C\"        No results for input(s): \"TSH\", \"T4F\", \"T3F\", \"THYP\" in the last 72 hours.  No results found.    Orders Placed This Encounter   Procedures    TSH W Reflex To Free T4     Orders Placed This Encounter    TSH W Reflex To Free T4     Standing Status:   Future     Expected Date:   9/3/2025     Expiration Date:   6/3/2026     Release to patient:   Immediate    UNITHROID 75 MCG Oral Tab     Sig: Take 1 tablet (75 mcg total) by mouth before breakfast.     Dispense:  90 tablet     Refill:  0          This is a specialized patient consultation in endocrinology and required comprehensive review of prior records, as well as current evaluation, with time required for consideration of complex endocrine issues and consultation. For this visit, I personally interviewed the patient, and family member if accompanied, performed the pertinent parts of the history and physical examination. ROS included screening for appropriate endocrine conditions.   Today's diagnosis and plan were reviewed in detail with the patient who states understanding and agrees with plan. I discussed with the patient possible diagnosis, differential diagnosis, need for work up, treatment options, alternatives and side effects.     Please see note for details about time spent which  includes:   · pre-visit preparation  · reviewing records  · face to face time with the patient   · timely documentation of the encounter  · ordering medications/tests  · communication with care team  · care coordination    I appreciate the opportunity to be part of your patient's medical care and will keep you, as the referring and primary physicians, informed about the care of your patient. Please feel free to contact me should you have any questions.    The 21st Century Cures Act makes medical notes like these available to patients in the interest of transparency. Please be advised this is a medical document. Medical documents are intended to carry relevant information, facts as evident, and the clinical opinion of the practitioner. The medical note is intended as peer to peer communication and may appear blunt or direct. It is written in medical language and may contain abbreviations or verbiage that are unfamiliar.   Tru Leal MD

## 2025-06-03 NOTE — PATIENT INSTRUCTIONS
Labs and follow up in 3 mo   Will increase thyroid dose 50 to 75 mcg/day of unithroid     Take you medication on empty stomach, not with any other medication or food. Wait 60 minutes before eating. Wait 4 hours before taking Vitamins, Calcium or iron. On the morning of the lab test, please take the medication after the blood test not before. Do not take Biotin 1 week before blood test.           [email or web address]    Don’t take your thyroid hormone at the same time as:  Walnuts.  Soybean flour.  Cottonseed meal.  Iron supplements or multivitamins containing iron.  Calcium supplements.  Antacids that contain aluminum, magnesium or calcium.  Some ulcer medicines, such as sucralfate (Carafate).  Some cholesterol-lowering drugs, such as those containing cholestyramine (Prevalite, Locholest) and colestipol (Colestid).  To avoid possible problems, eat these foods or use these products several hours before or after you take your thyroid medicine.    Supplements containing biotin, common in hair and nail products, can make it hard to measure how much thyroid hormone is in the body. Biotin does not affect thyroid hormone levels. But supplements that have biotin should be stopped for at least a week before measuring thyroid function so that the measurement is correct.       Latest Reference Range & Units 05/09/25 09:03   T4,Free (Direct) 0.8 - 1.7 ng/dL 1.1   TSH 0.550 - 4.780 uIU/mL 4.136

## 2025-06-10 ENCOUNTER — TELEPHONE (OUTPATIENT)
Age: 55
End: 2025-06-10

## 2025-06-12 ENCOUNTER — TELEPHONE (OUTPATIENT)
Age: 55
End: 2025-06-12

## 2025-06-20 ENCOUNTER — APPOINTMENT (OUTPATIENT)
Age: 55
End: 2025-06-20
Attending: INTERNAL MEDICINE
Payer: COMMERCIAL

## 2025-06-20 ENCOUNTER — OFFICE VISIT (OUTPATIENT)
Age: 55
End: 2025-06-20
Attending: INTERNAL MEDICINE
Payer: COMMERCIAL

## 2025-06-20 VITALS
DIASTOLIC BLOOD PRESSURE: 75 MMHG | BODY MASS INDEX: 32.52 KG/M2 | HEART RATE: 65 BPM | HEIGHT: 70 IN | TEMPERATURE: 98 F | SYSTOLIC BLOOD PRESSURE: 119 MMHG | OXYGEN SATURATION: 97 % | RESPIRATION RATE: 18 BRPM | WEIGHT: 227.19 LBS

## 2025-06-20 DIAGNOSIS — Z51.12 ENCOUNTER FOR ANTINEOPLASTIC IMMUNOTHERAPY: ICD-10-CM

## 2025-06-20 DIAGNOSIS — E03.2 HYPOTHYROIDISM DUE TO MEDICATION: ICD-10-CM

## 2025-06-20 DIAGNOSIS — C34.11 PRIMARY CANCER OF RIGHT UPPER LOBE OF LUNG (HCC): Primary | ICD-10-CM

## 2025-06-20 DIAGNOSIS — Z51.89 CONVALESCENCE FOLLOWING OTHER TREATMENT: ICD-10-CM

## 2025-06-20 NOTE — PROGRESS NOTES
HPI   Emy Gibson is a 55 year old female here for follow up of Primary cancer of right upper lobe of lung (HCC)    Encounter for antineoplastic immunotherapy    Hypothyroidism due to medication    Convalescence following other treatment.    She s/p of cycle 4 of pemetrexed/cisplatin on 4/26/24.    Currently s/p cycle 8  pembrolizumab single agent.     Fatigue:  No, recovered.     Fevers:  No    Appetite/taste changes:  No, resolved.    Mucositis:  No    Weight changes:  No    Nausea/vomiting:  No    Diarrhea:  No    Constipation:  No    Peripheral neuropathy:  Yes, since last visit intermittent tingling of the toes.  Much less often.  States mostly due to position changes from back and hip problems.    Numbness in the R axilla and in the upper chest.  Pain since surgery.  No complaints today, states resolved.        ECOG PS 1, fatigue     Oncology History   Primary cancer of right upper lobe of lung (HCC)   2/14/2024 Cancer Staged    Staging form: Lung, AJCC 8th Edition  - Pathologic stage from 2/14/2024: Stage IIIA (pT2a, pN2, cM0)     2/15/2024 Initial Diagnosis    Adenocarcinoma of right lung (HCC)     2/23/2024 - 4/26/2024 Chemotherapy    OP Pemetrexed / CISplatin  Plan Provider: Igor Mccurdy MD  Treatment goal: Curative  Line of treatment: [No plan line of treatment]     5/29/2024 - 5/9/2025 Chemotherapy    OP lung cancer Pembrolizumab  Plan Provider: Stanislaw Maier MD  Treatment goal: Curative- Adjuvant  Line of treatment: [No plan line of treatment]         Review of Systems:     Review of Systems   HENT:   Positive for tinnitus (has gotten better with massage or tapping behind the ears.).         Sinus issues, no URI symptoms   Respiratory:  Negative for cough and shortness of breath.    Cardiovascular:  Negative for chest pain.   Gastrointestinal:  Negative for abdominal pain.   Genitourinary:  Positive for nocturia. Negative for dysuria and frequency.    Musculoskeletal:  Positive for arthralgias (R  hip with walking chronic) and back pain (when laying down has pain on the LLE that is lateral and burning, states prior to dx of ca.). Negative for neck pain.   Skin:  Negative for itching and rash.   Neurological:  Negative for dizziness and headaches.   Hematological:  Negative for adenopathy. Does not bruise/bleed easily.   Psychiatric/Behavioral:  Positive for sleep disturbance.          Current Outpatient Medications   Medication Sig Dispense Refill    UNITHROID 75 MCG Oral Tab Take 1 tablet (75 mcg total) by mouth before breakfast. 90 tablet 0    LORazepam 0.5 MG Oral Tab Take 1 tablet (0.5 mg total) by mouth nightly as needed for Anxiety. 30 tablet 0    albuterol 108 (90 Base) MCG/ACT Inhalation Aero Soln Inhale 2 puffs into the lungs every 6 (six) hours as needed for Wheezing. 8.5 g 5    meclizine 25 MG Oral Tab Take 1 tablet (25 mg total) by mouth 3 (three) times daily as needed. 21 tablet 0     Allergies:   Allergies   Allergen Reactions    Singulair [Montelukast] SWELLING    Pollen OTHER (SEE COMMENTS)     Nasal congestion, runny nose       Past Medical History:    Adenocarcinoma of lung, right (HCC)    T2aN2    Cancer (HCC)    H/O: hysterectomy    Shortness of breath     Past Surgical History:   Procedure Laterality Date    Hysteroscopy      Lung lobectomy Right 2024    VATS upper lobectomy by Dr. Fong    Tonsillectomy       Social History     Socioeconomic History    Marital status:      Spouse name: Not on file    Number of children: Not on file    Years of education: Not on file    Highest education level: Not on file   Occupational History    Not on file   Tobacco Use    Smoking status: Former     Current packs/day: 0.00     Types: Cigarettes     Quit date: 2024     Years since quittin.4    Smokeless tobacco: Never   Vaping Use    Vaping status: Never Used   Substance and Sexual Activity    Alcohol use: Not Currently    Drug use: Not Currently    Sexual activity: Not on file    Other Topics Concern    Not on file   Social History Narrative    Not on file     Social Drivers of Health     Food Insecurity: No Food Insecurity (2/8/2024)    Food Insecurity     Food Insecurity: Never true   Transportation Needs: No Transportation Needs (2/8/2024)    Transportation Needs     Lack of Transportation: No   Housing Stability: Low Risk  (2/8/2024)    Housing Stability     Housing Instability: No     Housing Instability Emergency: Not on file     Crib or Bassinette: Not on file     Family History   Problem Relation Age of Onset    Hypertension Mother     Cancer Mother     Lung Disorder Mother     Cancer Paternal Grandfather          PHYSICAL EXAM:    /75 (BP Location: Left arm, Patient Position: Sitting, Cuff Size: adult)   Pulse 65   Temp 97.8 °F (36.6 °C) (Tympanic)   Resp 18   Ht 1.778 m (5' 10\")   Wt 103.1 kg (227 lb 3.2 oz)   SpO2 97%   BMI 32.60 kg/m²   Wt Readings from Last 6 Encounters:   06/20/25 103.1 kg (227 lb 3.2 oz)   06/03/25 101.6 kg (224 lb)   05/09/25 102.8 kg (226 lb 9.6 oz)   03/28/25 99.8 kg (220 lb)   02/14/25 98.4 kg (217 lb)   01/03/25 96.2 kg (212 lb)     Physical Exam  General: Patient is alert, not in acute distress.  HEENT: EOMs intact. PERRL.    Neck: No JVD. No palpable lymphadenopathy. Neck is supple.  Chest: Clear to auscultation.  Heart: Regular rate and rhythm.   Abdomen: Soft, non tender with good bowel sounds.  Extremities: No edema.  Neurological: Grossly intact.   Lymphatics: There is no palpable lymphadenopathy throughout in the cervical, supraclavicular, axillary, or inguinal regions.  Psych/Depression: nl        ASSESSMENT/PLAN:     1. Primary cancer of right upper lobe of lung (HCC)    2. Encounter for antineoplastic immunotherapy    3. Hypothyroidism due to medication    4. Convalescence following other treatment       Cancer Staging   Primary cancer of right upper lobe of lung (HCC)  Staging form: Lung, AJCC 8th Edition  - Pathologic stage from  2/14/2024: Stage IIIA (pT2a, pN2, cM0) - Signed by Stanislaw Maier MD on 5/24/2024    Patient initially diagnosed with invasive adenocarcinoma of the right upper lobe in December 2023.  She then underwent a right upper lobectomy with mediastinal lymph node dissection on 2/14/2024.  Staging as above.  This was ALK and EGFR negative and PD-L1 40%.    Patient has completed 4 cycles of adjuvant chemotherapy with cisplatin and pembrolizumab, course of treatment from 2/23/2024 through 4/26/2024.    Since the patient is PD-L1 positive, we will proceed with adjuvant pembrolizumab 400 mg every 6 weeks for up to 1 year.      S/p cycle 8  pembrolizumab every 6 weeks, completed treatment on 5/9/2025    Noted elevated TSH - 49.580 pt asymptomatic Cycle 5 was held.  TSH improved with medication      Hypothyroidism from immunotherapy:  Endocrine following        Discussed with the patient NCCN guidelines for surveillance which recommend H&P and chest CT ± contrast every 3-6 mo  for 3 y, then H&P and chest CT ± contrast every 6 mo for 2 y, then H&P and a low-dose non-contrast-enhanced chest CT annually.    Given the patient has completed her course of chemotherapy, will be now on adjuvant immune checkpoint inhibitor for a year, will be initiating surveillance that will overlap with the first year of the immune checkpoint immunotherapy.  CT on 2/3/2025 was JENNIFER.  Recommend repeat imaging in 6 months, she will be 2 years after that imaging in August 2025.  She will then have imaging once a year part of her surveillance.  CT is scheduled on 8/1/2025.  She will have follow-up after that with initiation of surveillance.    Once patient completes her course of adjuvant treatment, she will be referred to our survivorship clinic.    The patient is advised to begin or continue progressive daily aerobic exercise program, follow a low fat, low cholesterol diet, attempt to loose weight, decrease or avoid alcohol intake, have regular screening  exams as age/gender appropriate, reduce salt in the diet and cooking, reduce exposure to stress, improve dietary compliance and continue current medications.  Information for the [facility] and programs to support the above recommendations was provided to the patient.     Patient does not have a primary care doctor.  I discussed with patient the importance of establishing care with a primary care doctor for routine medical care, screening and prevention.  Patient will be provided information about her primary care doctors that are located close to her home.    MDM high risk.  I have a longitudinal and continuous care relationship with this patient for the management of lung cancer, a serious or complex condition.  is applicable because the patient's medical record notes over time support that there is a longitudinal care relationship with me, the care plan reflects the ongoing nature of the continuous relationship of care, and the medical record indicates that there is ongoing treatment of a serious/complex medical condition which I am currently managing.       No orders of the defined types were placed in this encounter.      Results From Past 48 Hours:  No results found for this or any previous visit (from the past 48 hours).        Imaging & Referrals:  None   No orders of the defined types were placed in this encounter.

## 2025-07-01 ENCOUNTER — TELEPHONE (OUTPATIENT)
Age: 55
End: 2025-07-01

## 2025-07-10 ENCOUNTER — TELEPHONE (OUTPATIENT)
Age: 55
End: 2025-07-10

## 2025-08-01 ENCOUNTER — HOSPITAL ENCOUNTER (OUTPATIENT)
Dept: CT IMAGING | Facility: HOSPITAL | Age: 55
Discharge: HOME | End: 2025-08-01
Attending: NURSE PRACTITIONER

## 2025-08-01 DIAGNOSIS — C34.11 PRIMARY CANCER OF RIGHT UPPER LOBE OF LUNG (HCC): ICD-10-CM

## 2025-08-01 LAB
CREAT BLD-MCNC: 0.9 MG/DL (ref 0.55–1.02)
EGFRCR SERPLBLD CKD-EPI 2021: 75 ML/MIN/1.73M2 (ref 60–?)

## 2025-08-01 PROCEDURE — 82565 ASSAY OF CREATININE: CPT

## 2025-08-01 PROCEDURE — 71260 CT THORAX DX C+: CPT | Performed by: NURSE PRACTITIONER

## 2025-08-08 ENCOUNTER — OFFICE VISIT (OUTPATIENT)
Facility: LOCATION | Age: 55
End: 2025-08-08
Attending: INTERNAL MEDICINE

## 2025-08-08 VITALS
WEIGHT: 227 LBS | SYSTOLIC BLOOD PRESSURE: 95 MMHG | BODY MASS INDEX: 32.5 KG/M2 | OXYGEN SATURATION: 96 % | TEMPERATURE: 97 F | HEIGHT: 70 IN | DIASTOLIC BLOOD PRESSURE: 65 MMHG | RESPIRATION RATE: 18 BRPM | HEART RATE: 84 BPM

## 2025-08-08 DIAGNOSIS — Z08 ENCOUNTER FOR FOLLOW-UP SURVEILLANCE OF LUNG CANCER: ICD-10-CM

## 2025-08-08 DIAGNOSIS — Z85.118 ENCOUNTER FOR FOLLOW-UP SURVEILLANCE OF LUNG CANCER: ICD-10-CM

## 2025-08-08 DIAGNOSIS — G62.0 PERIPHERAL NEUROPATHY DUE TO AND NOT CONCURRENT WITH CHEMOTHERAPY (HCC): ICD-10-CM

## 2025-08-08 DIAGNOSIS — C34.11 PRIMARY CANCER OF RIGHT UPPER LOBE OF LUNG (HCC): Primary | ICD-10-CM

## 2025-08-08 DIAGNOSIS — T45.1X5S PERIPHERAL NEUROPATHY DUE TO AND NOT CONCURRENT WITH CHEMOTHERAPY (HCC): ICD-10-CM

## 2025-08-29 ENCOUNTER — TELEPHONE (OUTPATIENT)
Facility: LOCATION | Age: 55
End: 2025-08-29

## (undated) DEVICE — SLEEVE COMPR M KNEE LEN SGL USE KENDALL SCD

## (undated) DEVICE — SOLUTION IRRIG 1000ML ST H2O AQUALITE PLAS

## (undated) DEVICE — SOLUTION ANTIFOG W/ ADH BK FOAM SPNG RADPQ

## (undated) DEVICE — SUTURE MCRYL SZ 4-0 L18IN ABSRB UD L19MM PS-2

## (undated) DEVICE — AGENT HEMSTAT 2X14IN OXIDIZED REGENERATED

## (undated) DEVICE — APPLICATOR SWAB L6IN GEN PURP INDIVIDUALLY .

## (undated) DEVICE — DRAPE INCIS W17XL23IN FAB ANTIMIC FULL W HNDL

## (undated) DEVICE — MEDI-VAC NON-CONDUCTIVE SUCTION TUBING: Brand: CARDINAL HEALTH

## (undated) DEVICE — SINGLE USE ASPIRATION NEEDLE: Brand: SINGLE USE ASPIRATION NEEDLE

## (undated) DEVICE — Device

## (undated) DEVICE — ABSORBABLE HEMOSTAT (OXIDIZED REGENERATED CELLULOSE): Brand: SURGICEL

## (undated) DEVICE — GAUZE SPONGES,USP TYPE VII GAUZE, 12 PLY: Brand: CURITY

## (undated) DEVICE — TRAY CATH 16FR F INCL BARDX IC COMPLT CARE

## (undated) DEVICE — BLADE ELECTRODE: Brand: EDGE

## (undated) DEVICE — SYRINGE MED 10ML LL TIP W/O SFTY DISP

## (undated) DEVICE — Device: Brand: BALLOON

## (undated) DEVICE — CATHETER THOR 28FR L23IN PVC 6 EYELT STR SFT

## (undated) DEVICE — SWIVEL CONNECTOR

## (undated) DEVICE — SINGLE USE BIOPSY VALVE MAJ-210: Brand: SINGLE USE BIOPSY VALVE (STERILE)

## (undated) DEVICE — CONMED SCOPE SAVER BITE BLOCK, 20X27 MM: Brand: SCOPE SAVER

## (undated) DEVICE — SUTURE SILK 0 FSL

## (undated) DEVICE — ROCKER SWITCH PENCIL BLADE ELECTRODE, HOLSTER: Brand: EDGE

## (undated) DEVICE — GLOVE SUR 6 PROTEXIS PI PIP CRM PWD F

## (undated) DEVICE — STAPLER MED SHFT L340MM JAW L45MM STD ECHELON

## (undated) DEVICE — STERILE POLYISOPRENE POWDER-FREE SURGICAL GLOVES: Brand: PROTEXIS

## (undated) DEVICE — 3M™ IOBAN™ 2 ANTIMICROBIAL INCISE DRAPE 6650EZ: Brand: IOBAN™ 2

## (undated) DEVICE — KIT CLEAN ENDOKIT 1.1OZ GOWNX2

## (undated) DEVICE — SUTURE VCRL SZ 2-0 L36IN ABSRB UD L36MM CT-1

## (undated) DEVICE — ARYGLE SUCTION CATHETER WITH CHIMNEY VALVE STRIAGHT PACKED 14 FR/ CH: Brand: ARGYLE

## (undated) DEVICE — ADAPTER TBNG DIA15MM SWVL FBROPT BRONCHSCP

## (undated) DEVICE — NON-ADHERENT PAD PREPACK: Brand: TELFA

## (undated) DEVICE — CAUTERY PENCIL EDGE ROCKER

## (undated) DEVICE — STANDARD HYPODERMIC NEEDLE,POLYPROPYLENE HUB: Brand: MONOJECT

## (undated) DEVICE — ADHESIVE SKIN TOP FOR WND CLSR DERMBND ADV

## (undated) DEVICE — FORCEPS BX 100CM 1.8MM RJ STD

## (undated) DEVICE — APPLIER CLP L13IN DIA5MM M/L MULT FOR LIG

## (undated) DEVICE — TUBING SUCTION 4 X 10

## (undated) DEVICE — DRAIN SURG SGL COLL PT TB FOR ATS BG OASIS

## (undated) DEVICE — WOUND RETRACTOR AND PROTECTOR: Brand: ALEXIS WOUND PROTECTOR-RETRACTOR

## (undated) DEVICE — NEEDLE HYPO 22X1-1/2

## (undated) DEVICE — CV PACK-LF: Brand: MEDLINE INDUSTRIES, INC.

## (undated) DEVICE — GLOVE SUR 7.5 PROTEXIS PI PIP CRM PWD F

## (undated) DEVICE — BIOPSY NEEDLE, 23G: Brand: FLEXISION

## (undated) DEVICE — SEALANT TISS GLUE 4ML PLEUR AIR LEAK PROGEL

## (undated) DEVICE — SINGLE USE SUCTION VALVE MAJ-209: Brand: SINGLE USE SUCTION VALVE (STERILE)

## (undated) DEVICE — YANKAUER,BULB TIP,W/O VENT,RIGID,STERILE: Brand: MEDLINE

## (undated) DEVICE — KIT,ANTI FOG,W/SPONGE & FLUID,SOFT PACK: Brand: MEDLINE

## (undated) DEVICE — PACK CV CUSTOM

## (undated) DEVICE — VISION PROBE ADAPTER AND SUCTION ADAPTER

## (undated) DEVICE — 60 ML SYRINGE REGULAR TIP: Brand: MONOJECT

## (undated) DEVICE — Device: Brand: ION

## (undated) NOTE — Clinical Note
TCM assessment completed with patient. The patient is scheduled to establish care with you on 02/19/2024. A TE has been sent to clinical staff regarding this upcoming appt as a TCM appt is recommended.  Thank you.

## (undated) NOTE — LETTER
3/28/2025              Emy Gibson        [address] [place] [address]         Dear Emy,    Your last infusion is scheduled for 5/9/2025, unless there are any complications.  After that you will need follow up visits for monitoring for cancer recurrence.       Sincerely,        Stanislaw Maier M.D.  [facility]  Associate Medical Director  Tegan Ronquillo [facility] [place]  [address]  [phone]      03/28/25              Document electronically generated by:  Stanislaw Maier MD

## (undated) NOTE — Clinical Note
Ms. Gibson is doing well.  Please see attached note for an update on her operation and pathology. Please feel free to call me with any questions at [phone].  Thank you,  Chandraknat F[facility]

## (undated) NOTE — LETTER
To Whom It May Concern:  This certifies that Emy Gibson is under our care and will require surgery on 1/29/24. Please excuse Mr. Matthew Gibson from work on her day of surgery and for her recovery after.  Do not hesitate to call with any questions or concerns.        Sincerely,      Katie Nolan PA-C  [facility] [address] [place] [address] [place] [id number]  [phone]        Document generated by: Katie Nolan PA-C

## (undated) NOTE — LETTER
[address]  Authorization for Surgical Operation and Procedure                                                                                           I hereby authorize Claudene Junker, DO, my physician and his/her assistants (if applicable), which may include medical students, residents, and/or fellows, to perform the following surgical operation/ procedure and administer such anesthesia as may be determined necessary by my physician: Operation/Procedure name (s) ION ROBOT-ASSISTED NAVIGATIONAL BRONCHOSCOPY / [address]/[facility] on [address]   2. I recognize that during the surgical operation/procedure, unforeseen conditions may necessitate additional or different procedures than those listed above. I, therefore, further authorize and request that the above-named surgeon, assistants, or designees perform such procedures as are, in their judgment, necessary and desirable. 3.   My surgeon/physician has discussed prior to my surgery the potential benefits, risks and side effects of this procedure; the likelihood of achieving goals; and potential problems that might occur during recuperation. They also discussed reasonable alternatives to the procedure, including risks, benefits, and side effects related to the alternatives and risks related to not receiving this procedure. I have had all my questions answered and I acknowledge that no guarantee has been made as to the result that may be obtained. 4.   Should the need arise during my operation/procedure, which includes change of level of care prior to discharge, I also consent to the administration of blood and/or blood products. Further, I understand that despite careful testing and screening of blood or blood products by collecting agencies, I may still be subject to ill effects as a result of receiving a blood transfusion and/or blood products.   The following are some, but not all, of the potential risks that can occur: fever and allergic reactions, hemolytic reactions, transmission of diseases such as Hepatitis, AIDS and Cytomegalovirus (CMV) and fluid overload. In the event that I wish to have an autologous transfusion of my own blood, or a directed donor transfusion, I will discuss this with my physician. Check only if Refusing Blood or Blood Products  I understand refusal of blood or blood products as deemed necessary by my physician may have serious consequences to my condition to include possible death. I hereby assume responsibility for my refusal and release the hospital, its personnel, and my physicians from any responsibility for the consequences of my refusal.    o  Refuse   5. I authorize the use of any specimen, organs, tissues, body parts or foreign objects that may be removed from my body during the operation/procedure for diagnosis, research or teaching purposes and their subsequent disposal by hospital authorities. I also authorize the release of specimen test results and/or written reports to my treating physician on the hospital medical staff or other referring or consulting physicians involved in my care, at the discretion of the Pathologist or my treating physician. 6.   I consent to the photographing or videotaping of the operations or procedures to be performed, including appropriate portions of my body for medical, scientific, or educational purposes, provided my identity is not revealed by the pictures or by descriptive texts accompanying them. If the procedure has been photographed/videotaped, the surgeon will obtain the original picture, image, videotape or CD. The hospital will not be responsible for storage, release or maintenance of the picture, image, tape or CD.    7.   I consent to the presence of a  or observers in the operating room as deemed necessary by my physician or their designees.     8.   I recognize that in the event my procedure results in extended X-Ray/fluoroscopy time, I may develop a skin reaction. 9. If I have a Do Not Attempt Resuscitation (DNAR) order in place, that status will be suspended while in the operating room, procedural suite, and during the recovery period unless otherwise explicitly stated by me (or a person authorized to consent on my behalf). The surgeon or my attending physician will determine when the applicable recovery period ends for purposes of reinstating the DNAR order. 10. Patients having a sterilization procedure: I understand that if the procedure is successful the results will be permanent and it will therefore be impossible for me to inseminate, conceive, or bear children. I also understand that the procedure is intended to result in sterility, although the result has not been guaranteed. 11. I acknowledge that my physician has explained sedation/analgesia administration to me including the risk and benefits I consent to the administration of sedation/analgesia as may be necessary or desirable in the judgment of my physician. I CERTIFY THAT I HAVE READ AND FULLY UNDERSTAND THE ABOVE CONSENT TO OPERATION and/or OTHER PROCEDURE.     _________________________________________ _________________________________     ___________________________________  Signature of Patient     Signature of Responsible Person                   Printed Name of Responsible Person                              _________________________________________ ______________________________        ___________________________________  Signature of Witness         Date  Time         Relationship to Patient    STATEMENT OF PHYSICIAN My signature below affirms that prior to the time of the procedure; I have explained to the patient and/or his/her legal representative, the risks and benefits involved in the proposed treatment and any reasonable alternative to the proposed treatment.  I have also explained the risks and benefits involved in refusal of the proposed treatment and alternatives to the proposed treatment and have answered the patient's questions.  If I have a significant financial interest in a co-management agreement or a significant financial interest in any product or implant, or other significant relationship used in this procedure/surgery, I have disclosed this and had a discussion with my patient.     _______________________________________________________________ _____________________________  Brendia Fat of Physician)                                                                                         (Date)                                   (Time)  Patient Name: Clarisse Dhillon    : 1970   Printed: 2023      Medical Record #: [id number]                                              Page 1 of 1

## (undated) NOTE — LETTER
To Whom It May Concern:  This certifies that Emy Gibson is under our care. Her surgery was rescheduled from 1/29/24 to 2/8/24. Therefore, her time off will need to be adjusted accordingly. Do not hesitate to call with any questions or concerns.        Sincerely,      Katie Nolan PA-C  [address] [address]  [phone]        Document generated by:  Katie Nolan PA-C

## (undated) NOTE — Clinical Note
Hi,  This very nice lady needs a PCP.  She is a Lung ca patient that is now on adjuvant immunotherapy.  Can your office please call her to establish care with you ASAP?  Thanks, AG

## (undated) NOTE — Clinical Note
I saw your patient, Ms. Gibson.  Please see attached note for my assessment and plans moving forward.  Thank you for involving her care.  Please feel free to call me with any questions at [phone]  Chandrakant Fong [facility]